# Patient Record
Sex: FEMALE | Race: WHITE | NOT HISPANIC OR LATINO | Employment: OTHER | URBAN - METROPOLITAN AREA
[De-identification: names, ages, dates, MRNs, and addresses within clinical notes are randomized per-mention and may not be internally consistent; named-entity substitution may affect disease eponyms.]

---

## 2017-01-23 ENCOUNTER — TELEPHONE (OUTPATIENT)
Dept: FAMILY MEDICINE | Facility: CLINIC | Age: 67
End: 2017-01-23

## 2017-01-23 DIAGNOSIS — F41.9 ANXIETY: Primary | ICD-10-CM

## 2017-01-23 RX ORDER — ESCITALOPRAM OXALATE 10 MG/1
10 TABLET ORAL DAILY
Qty: 90 TABLET | Refills: 0 | Status: SHIPPED | OUTPATIENT
Start: 2017-01-23 | End: 2017-03-08 | Stop reason: DRUGHIGH

## 2017-01-23 NOTE — TELEPHONE ENCOUNTER
Lexapro  Last Written Prescription Date: 12/28/16  Last Fill Quantity: 30, # refills: 0  Last Office Visit with [facility] primary care provider:  Was suppose to come in  1/17/17 apt now scheduled on 2/10/17  Do you wish to refill? Mary was given? Routed to PCP for advise.   Next 5 appointments (look out 90 days)     Feb 10, 2017  2:20 PM   [facility]oun [facility]   [facility]    [address] [place] [id number] [phone]                   Last PHQ-9 score on record= No flowsheet data found.  Svetlana Tracy,Clinic Rn  [facility]

## 2017-02-10 ENCOUNTER — OFFICE VISIT (OUTPATIENT)
Dept: FAMILY MEDICINE | Facility: CLINIC | Age: 67
End: 2017-02-10
Payer: COMMERCIAL

## 2017-02-10 VITALS
RESPIRATION RATE: 12 BRPM | DIASTOLIC BLOOD PRESSURE: 74 MMHG | SYSTOLIC BLOOD PRESSURE: 132 MMHG | HEART RATE: 66 BPM | TEMPERATURE: 97.9 F | HEIGHT: 63 IN | WEIGHT: 115.4 LBS | BODY MASS INDEX: 20.45 KG/M2

## 2017-02-10 DIAGNOSIS — F41.9 ANXIETY: Primary | ICD-10-CM

## 2017-02-10 DIAGNOSIS — R41.3 MEMORY CHANGES: ICD-10-CM

## 2017-02-10 PROCEDURE — 99214 OFFICE O/P EST MOD 30 MIN: CPT | Performed by: FAMILY MEDICINE

## 2017-02-10 RX ORDER — ESCITALOPRAM OXALATE 10 MG/1
10 TABLET ORAL DAILY
Qty: 90 TABLET | Refills: 0 | Status: CANCELLED | OUTPATIENT
Start: 2017-02-10

## 2017-02-10 RX ORDER — ESCITALOPRAM OXALATE 20 MG/1
20 TABLET ORAL DAILY
Qty: 90 TABLET | Refills: 1 | Status: SHIPPED | OUTPATIENT
Start: 2017-02-10 | End: 2017-08-31

## 2017-02-10 ASSESSMENT — ANXIETY QUESTIONNAIRES
5. BEING SO RESTLESS THAT IT IS HARD TO SIT STILL: NOT AT ALL
7. FEELING AFRAID AS IF SOMETHING AWFUL MIGHT HAPPEN: NOT AT ALL
3. WORRYING TOO MUCH ABOUT DIFFERENT THINGS: SEVERAL DAYS
1. FEELING NERVOUS, ANXIOUS, OR ON EDGE: NOT AT ALL
GAD7 TOTAL SCORE: 1
IF YOU CHECKED OFF ANY PROBLEMS ON THIS QUESTIONNAIRE, HOW DIFFICULT HAVE THESE PROBLEMS MADE IT FOR YOU TO DO YOUR WORK, TAKE CARE OF THINGS AT HOME, OR GET ALONG WITH OTHER PEOPLE: NOT DIFFICULT AT ALL
2. NOT BEING ABLE TO STOP OR CONTROL WORRYING: NOT AT ALL
6. BECOMING EASILY ANNOYED OR IRRITABLE: NOT AT ALL

## 2017-02-10 ASSESSMENT — PATIENT HEALTH QUESTIONNAIRE - PHQ9: 5. POOR APPETITE OR OVEREATING: NOT AT ALL

## 2017-02-10 NOTE — NURSING NOTE
"Chief Complaint   Patient presents with     Anxiety     F/u on medication     Memory Loss     Touch base on memory issues       Initial /80 mmHg  Pulse 66  Temp(Src) 97.9  F (36.6  C) (Oral)  Resp 12  Ht 5' 2.5\" (1.588 m)  Wt 115 lb 6.4 oz (52.345 kg)  BMI 20.76 kg/m2 Estimated body mass index is 20.76 kg/(m^2) as calculated from the following:    Height as of this encounter: 5' 2.5\" (1.588 m).    Weight as of this encounter: 115 lb 6.4 oz (52.345 kg).  Medication Reconciliation: complete    "

## 2017-02-10 NOTE — MR AVS SNAPSHOT
After Visit Summary   2/10/2017    Shari Graham    MRN: [id number]           Patient Information     Date Of Birth          1950        Visit Information        Provider Department      2/10/2017 2:20 PM Susan Calhoun MD [facility]        Today's Diagnoses     Anxiety    -  1       Care Instructions    I encourage you to consider neuropsych testing (which can help us determine possible causes of changes to memory or if there are significant memory issues). If you decide you would like to pursue this you can call my office to help arrange this testing.    However, I also support increasing your lexapro and seeing if reducing anxiety may help as well.    And I support the thoughtful decisions you are making about next steps.        Follow-ups after your visit        Who to contact     If you have questions or need follow up information about today's clinic visit or your schedule please contact [facility] directly at [phone].  Normal or non-critical lab and imaging results will be communicated to you by Jud[employer] Bookhart, letter or phone within 4 business days after the clinic has received the results. If you do not hear from us within 7 days, please contact the clinic through Judys Bookhart or phone. If you have a critical or abnormal lab result, we will notify you by phone as soon as possible.  Submit refill requests through [employer] or call your pharmacy and they will forward the refill request to us. Please allow 3 business days for your refill to be completed.          Additional Information About Your Visit        Judys Bookhart Information     [employer] gives you secure access to your electronic health record. If you see a primary care provider, you can also send messages to your care team and make appointments. If you have questions, please call your primary care clinic.  If you do not have a primary care provider, please call [phone] and they will  "assist you.        Care EveryWhere ID     This is your Care EveryWhere ID. This could be used by other organizations to access your [facility] medical records  [id number]        Your Vitals Were     Pulse Temperature Respirations Height BMI (Body Mass Index)       66 97.9  F (36.6  C) (Oral) 12 5' 2.5\" (1.588 m) 20.76 kg/m2        Blood Pressure from Last 3 Encounters:   02/10/17 132/74   09/20/16 130/76   06/21/16 140/80    Weight from Last 3 Encounters:   02/10/17 115 lb 6.4 oz (52.345 kg)   09/20/16 110 lb (49.896 kg)   06/21/16 106 lb (48.081 kg)              Today, you had the following     No orders found for display         Today's Medication Changes          These changes are accurate as of: 2/10/17  3:02 PM.  If you have any questions, ask your nurse or doctor.               These medicines have changed or have updated prescriptions.        Dose/Directions    * escitalopram 10 MG tablet   Commonly known as:  LEXAPRO   This may have changed:  Another medication with the same name was added. Make sure you understand how and when to take each.   Used for:  Anxiety   Changed by:  Susan Calhoun MD        Dose:  10 mg   Take 1 tablet (10 mg) by mouth daily   Quantity:  90 tablet   Refills:  0       * escitalopram 20 MG tablet   Commonly known as:  LEXAPRO   This may have changed:  You were already taking a medication with the same name, and this prescription was added. Make sure you understand how and when to take each.   Used for:  Anxiety   Changed by:  Susan Calhoun MD        Dose:  20 mg   Take 1 tablet (20 mg) by mouth daily   Quantity:  90 tablet   Refills:  1       * Notice:  This list has 2 medication(s) that are the same as other medications prescribed for you. Read the directions carefully, and ask your doctor or other care provider to review them with you.         Where to get your medicines      These medications were sent to [facility] [place], [place] " - [address]  [address] [facility] [address]     Phone:  [phone]    - escitalopram 20 MG tablet             Primary Care Provider Office Phone # Fax #    Susan Calhoun [phone]       [facility] [address] [address]        Thank you!     Thank you for choosing [facility]  for your care. Our goal is always to provide you with excellent care. Hearing back from our patients is one way we can continue to improve our services. Please take a few minutes to complete the written survey that you may receive in the mail after your visit with us. Thank you!             Your Updated Medication List - Protect others around you: Learn how to safely use, store and throw away your medicines at www.disposemymeds.org.          This list is accurate as of: 2/10/17  3:02 PM.  Always use your most recent med list.                   Brand Name Dispense Instructions for use    BIOTIN          CALCIUM CARBONATE          * escitalopram 10 MG tablet    LEXAPRO    90 tablet    Take 1 tablet (10 mg) by mouth daily       * escitalopram 20 MG tablet    LEXAPRO    90 tablet    Take 1 tablet (20 mg) by mouth daily       FISH OIL          FLAXSEED          GLUCOSAMINE CHOND COMPLEX/MSM Tabs          metoprolol 25 MG 24 hr tablet    TOPROL-XL    90 tablet    Take 1 tablet (25 mg) by mouth daily       MULTIPLE VITAMIN PO          simvastatin 10 MG tablet    ZOCOR    90 tablet    Take 1 tablet (10 mg) by mouth At Bedtime       traZODone 50 MG tablet    DESYREL    60 tablet    1/2 tablet up to 2 tablets once nightly as needed for insomnia       VITAMIN D PO          VITAMIN E          * Notice:  This list has 2 medication(s) that are the same as other medications prescribed for you. Read the directions carefully, and ask your doctor or other care provider to review them with you.

## 2017-02-10 NOTE — PATIENT INSTRUCTIONS
I encourage you to consider neuropsych testing (which can help us determine possible causes of changes to memory or if there are significant memory issues). If you decide you would like to pursue this you can call my office to help arrange this testing.    However, I also support increasing your lexapro and seeing if reducing anxiety may help as well.    And I support the thoughtful decisions you are making about next steps.    [facility]   Discharged by : Marlee LARKIN MA    If you have any questions regarding your visit please contact your care team:     [facility] Clinic Hours Telephone Number   Dr. Rosita Guardado, HOLDEN   7am-7pm Monday - Thursday   7am-5pm Fridays  [phone]   (Appointment scheduling available 24/7)   RN Line   [phone] option 2       For a Price Quote for your services, please call our [place] Price Line at [phone].     What options do I have for visits at the clinic other than the traditional office visit?     To expand how we care for you, many of our providers are utilizing electronic visits (e-visits) and telephone visits, when medically appropriate, for interactions with their patients rather than a visit in the clinic. We also offer nurse visits for many medical concerns. Just like any other service, we will bill your insurance company for this type of visit based on time spent on the phone with your provider. Not all insurance companies cover these visits. Please check with your medical insurance if this type of visit is covered. You will be responsible for any charges that are not paid by your insurance.   E-visits via [employer]: generally incur a $35.00 fee.     Telephone visits:   Time spent on the phone: *charged based on time that is spent on the phone in increments of 10 minutes. Estimated cost:   5-10 mins $30.00   11-20 mins. $59.00   21-30 mins. $85.00     Use [employer] (secure email communication and  access to your chart) to send your primary care provider a message or make an appointment. Ask someone on your Team how to sign up for [employer].     As always, Thank you for trusting us with your health care needs!      [facility]:    Scheduling Appointments  [facility]  Call: [phone]    LawrenceElio martinemily, [facility]  Call: [phone]    [facility]  Call: [phone]      WHERE TO GO FOR CARE?    Clinic    Make an appointment if you:       Are sick (cold, cough, flu, sore throat, earache or in pain).       Have a small injury (sprain, small cut, burn or broken bone).       Need a physical exam, Pap smear, vaccine or prescription refill.       Have questions about your health or medicines.    To reach us:      Call [phone] ([phone]). Open 24 hours every day. (For counseling services, call [phone].)    Log into [employer] at [email or web address]. (Visit [email or web address] to create an account.) Hospital emergency room    An emergency is a serious or life- threatening problem that must be treated right away.    Call 766 or get to the hospital if you have:      Very bad or sudden:            - Chest pain or pressure         - Bleeding         - Head or belly pain         - Dizziness or trouble seeing, walking or                          Speaking      Problems breathing      Blood in your vomit or you are coughing up blood      A major injury (knocked out, loss of a finger or limb, rape, broken bone protruding from skin)    A mental health crisis. (Or call the Mental Health Crisis line at 1-420.405.8653 or Suicide Prevention Hotline at 1-837.348.3532.)    Open 24 hours every day. You don't need an appointment.     Urgent care    Visit urgent care for sickness or small injuries when the clinic is closed. You don't need an appointment. To check hours or find an urgent care near you, visit [email or web address]. Online  care    Get online care from [facility] for more than 70 common problems, like colds, allergies and infections. Open 24 hours every day at: [email or web address]; to ([email or web address]   Need help deciding?    For advice about where to be seen, you may call your clinic and ask to speak with a nurse. We're here for you 24 hours every day.         If you are deaf or hard of hearing, please let us know. We provide many free services including sign language interpreters, oral interpreters, TTYs, telephone amplifiers, note takers and written materials.

## 2017-02-10 NOTE — PROGRESS NOTES
SUBJECTIVE:                                                    Shari Graham is a 66 year old female who presents to clinic today for the following health issues:      Anxiety Follow-Up    Status since last visit: Improved     Other associated symptoms:None    Complicating factors:   Significant life event: No   Current substance abuse: None  Depression symptoms: No  AURELIO-7 SCORE 9/8/2014 6/21/2016 7/19/2016   Total Score 8 - -   Total Score - 7 0        GAD7     Amount of exercise or physical activity: 2-3 days/week for an average of 45-60 minutes- Yoga    Problems taking medications regularly: No    Medication side effects: none    Diet: regular (no restrictions)    Denies medication side effects. She is eating better and has regained some weight. She still has occasional anxiety, especially with stressful situations, but notes she has had anxiety since childhood. She is interested in increasing Lexapro to 20 mg. She is sleeping well. Would like to continue trazodone.     Memory:  Patient had concerning 6CIT results 9/20/16, scoring 8/28. She went to [facility] and had a 24 hour flight home that day before that visit. She thinks this was a large part of her memory troubles and after a few weeks home did not worry about memory anymore. Discussed further neuropsych testing.     Patient adds that she hit her head around 4-5 years ago, when the garage door game down and struck the top of her head. The garage door kept coming down, causing the patient to fall. She laid on the ground in the garage for quite some time and couldn't get up. Denies loss of consciousness. She was eventually able to get up and laid in bed for a couple days with some pain at the top of her head. She also had headaches for a long time after that but they gradually went away. She says after that she felt things changed and her memory was not as clear. She was never seen after the fall and has not had any imaging of her head before. Patient  "notes she is very claustrophobic and would need to be sedated for imaging.     Her  clarifies that the patient is anxious when coming to the doctor and worried quite a bit about being asked more memory questions before coming in. Her  personally feels her anxiety is the main cause of her memory issues but she does occasionally misplace items (glasses, pen, etc.)  He thinks her medication is very helpful for her anxiety. They are together for most of the day and he explains day-to-day life is worry free and she has \"incidences\" of anxiety.      Problem list and histories reviewed & adjusted, as indicated.  Additional history: as documented    Patient Active Problem List   Diagnosis     Hyperlipidemia LDL goal <130     HTN, goal below 140/90     Adenomatous colon polyp     Anxiety     Past Surgical History   Procedure Laterality Date     Surgical history of -   grade school     neck cyst removed     Hysterectomy, pap no longer indicated  1995     for reasons of fibroids     Surgical history of -   2011     right cyst removed on wrist     Appendectomy       Colonoscopy         Social History   Substance Use Topics     Smoking status: Former Smoker -- 0.50 packs/day for 11 years     Types: Cigarettes     Quit date: 07/27/1981     Smokeless tobacco: Never Used     Alcohol Use: Yes      Comment: 2/wk     Family History   Problem Relation Age of Onset     HEART DISEASE Mother      DIABETES Mother      Hypertension Mother      CANCER Father      Respiratory Father      Alzheimer Disease Paternal Grandmother      Prostate Cancer Paternal Grandfather      Prostate Cancer Brother      Breast Cancer Sister      DIABETES Maternal Grandmother      Breast Cancer Sister      Prostate Cancer Brother          Current Outpatient Prescriptions   Medication Sig Dispense Refill     escitalopram (LEXAPRO) 10 MG tablet Take 1 tablet (10 mg) by mouth daily 90 tablet 0     simvastatin (ZOCOR) 10 MG tablet Take 1 tablet (10 mg) " "by mouth At Bedtime 90 tablet 3     metoprolol (TOPROL-XL) 25 MG 24 hr tablet Take 1 tablet (25 mg) by mouth daily 90 tablet 3     traZODone (DESYREL) 50 MG tablet 1/2 tablet up to 2 tablets once nightly as needed for insomnia 60 tablet 5     CALCIUM CARBONATE        Cholecalciferol (VITAMIN D PO)        VITAMIN E        FISH OIL        FLAXSEED        MULTIPLE VITAMIN PO        BIOTIN        Misc Natural Products (GLUCOSAMINE CHOND COMPLEX/MSM) TABS        No Known Allergies    ROS:  Constitutional, HEENT, cardiovascular, pulmonary, gi and gu systems are negative, except as otherwise noted.    This document serves as a record of the services and decisions personally performed by Susan Calhoun MD. It was created on his/her behalf by Faith Jenkins, a trained medical scribe. The creation of this document is based on the provider's statements to the medical scribe. Faith Jenkins February 10, 2017 2:23 PM  OBJECTIVE:                                                    /80 mmHg  Pulse 66  Temp(Src) 97.9  F (36.6  C) (Oral)  Resp 12  Ht 5' 2.5\" (1.588 m)  Wt 115 lb 6.4 oz (52.345 kg)  BMI 20.76 kg/m2  Body mass index is 20.76 kg/(m^2).  GENERAL: healthy, alert and no distress  NEURO: Normal strength and tone, mentation intact and speech normal  PSYCH: mentation appears normal, affect normal/bright    Six Item Cognitive Impairment Test   (6CIT):      What year is it?                               Correct - 0 points    What month is it?                               Correct - 0 points      Give the patient an address to remember with five components:   Vamshi Gonzalez ( first and last name - 2 components)   [address]  (number and name of street - 2 components)   [place] ( city - 1 component)      About what time is it (within the hour)? Correct - 0 points    Count backwards from 20 to 1:   One error - 2 points    Say the months of the year in reverse: Correct - 0 points    Repeat the address " phrase:   All wrong - 10 points    Total 6CIT Score:      12/28    Interpretation: The 6CIT uses an inverse score and questions are weighted to produce a total out of 28. Scores of 0-7 are considered normal and 8 or more significant.    Advantages The test has high sensitivity without compromising specificity even in mild dementia. It is easy to translate linguistically and culturally.  Disadvantages The main disadvantage is in the scoring and weighting of the test, which is initially confusing, however computer models have simplified this greatly.    Probability Statistics: At the 7/8 cut off: Overall figures sensitivity 90% specificity 100%, in mild dementia sensitivity = 78% , specificity = 100%    Copyright 2000 [facility], [facility]. Courtesy of Dr. Viral Nunes       ASSESSMENT/PLAN:                                                    (F41.9) Anxiety  (primary encounter diagnosis)  Comment: Still has situational anxiety from time to time. Patient desires to increase medication.   Plan: escitalopram (LEXAPRO) 20 MG tablet        Will increase to 20 mg daily      (R41.3) Memory changes  Comment: it seems quite possible that anxiety is influencing her ability to perform memory testing, even in the office  Plan: had a long discussion with patient and her .  Discussed the purpose of screening for memory concerns, the fact that they spend their days together may mask some memory changes but also provides protection from harm for Soraya (which is a significant reason we are screening).  Without confirmatory testing, we may be missing opportunities for treatment of treatable causes of memory changes.  They would prefer to continue to monitor at this time.  Discussed warning signs/symptoms for which she needs followup.      25minutes spent with patient>50% in counseling regarding causes and treatments of memory changes.        Patient Instructions     I encourage you to consider neuropsych  testing (which can help us determine possible causes of changes to memory or if there are significant memory issues). If you decide you would like to pursue this you can call my office to help arrange this testing.    However, I also support increasing your lexapro and seeing if reducing anxiety may help as well.    And I support the thoughtful decisions you are making about next steps.    [facility]   Discharged by : Marlee LARKIN MA    If you have any questions regarding your visit please contact your care team:     [facility] Clinic Hours Telephone Number   Dr. Rosita Guardado PA-C   7am-7pm Monday - Thursday   7am-5pm Fridays  [phone]   (Appointment scheduling available 24/7)   RN Line   [phone] option 2       For a Price Quote for your services, please call our [employer] Price Line at [phone].     What options do I have for visits at the clinic other than the traditional office visit?     To expand how we care for you, many of our providers are utilizing electronic visits (e-visits) and telephone visits, when medically appropriate, for interactions with their patients rather than a visit in the clinic. We also offer nurse visits for many medical concerns. Just like any other service, we will bill your insurance company for this type of visit based on time spent on the phone with your provider. Not all insurance companies cover these visits. Please check with your medical insurance if this type of visit is covered. You will be responsible for any charges that are not paid by your insurance.   E-visits via Paperlinks: generally incur a $35.00 fee.     Telephone visits:   Time spent on the phone: *charged based on time that is spent on the phone in increments of 10 minutes. Estimated cost:   5-10 mins $30.00   11-20 mins. $59.00   21-30 mins. $85.00     Use Paperlinks (secure email communication and access to your chart) to send your  primary care provider a message or make an appointment. Ask someone on your Team how to sign up for [employer].     As always, Thank you for trusting us with your health care needs!      [facility]:    Scheduling Appointments  [facility]  Call: [phone]    [place]  Call: [phone]    [facility]  Call: [phone]      WHERE TO GO FOR CARE?    Clinic    Make an appointment if you:       Are sick (cold, cough, flu, sore throat, earache or in pain).       Have a small injury (sprain, small cut, burn or broken bone).       Need a physical exam, Pap smear, vaccine or prescription refill.       Have questions about your health or medicines.    To reach us:      Call [phone] ([phone]). Open 24 hours every day. (For counseling services, call [phone].)    Log into [employer] at [email or web address]. (Visit [email or web address] to create an account.) Hospital emergency room    An emergency is a serious or life- threatening problem that must be treated right away.    Call 454 or get to the hospital if you have:      Very bad or sudden:            - Chest pain or pressure         - Bleeding         - Head or belly pain         - Dizziness or trouble seeing, walking or                          Speaking      Problems breathing      Blood in your vomit or you are coughing up blood      A major injury (knocked out, loss of a finger or limb, rape, broken bone protruding from skin)    A mental health crisis. (Or call the Mental Health Crisis line at 1-540.132.4186 or Suicide Prevention Hotline at 1-406.235.7616.)    Open 24 hours every day. You don't need an appointment.     Urgent care    Visit urgent care for sickness or small injuries when the clinic is closed. You don't need an appointment. To check hours or find an urgent care near you, visit [email or web address]. Online care    Get online care from [facility] for more than 70 common problems, like colds, allergies and infections. Open 24 hours every day at: [email or web address]   Need help deciding?    For advice about where to be seen, you may call your clinic and ask to speak with a nurse. We're here for you 24 hours every day.         If you are deaf or hard of hearing, please let us know. We provide many free services including sign language interpreters, oral interpreters, TTYs, telephone amplifiers, note takers and written materials.                       The information in this document, created by the medical scribmati Jenkins for me, accurately reflects the services I personally performed and the decisions made by me. I have reviewed and approved this document for accuracy prior to leaving the patient care area.  Susan Calhoun MD  [facility]

## 2017-02-11 ASSESSMENT — ANXIETY QUESTIONNAIRES: GAD7 TOTAL SCORE: 1

## 2017-03-08 ENCOUNTER — TELEPHONE (OUTPATIENT)
Dept: FAMILY MEDICINE | Facility: CLINIC | Age: 67
End: 2017-03-08

## 2017-03-08 NOTE — TELEPHONE ENCOUNTER
Calling to talk to Soraya regarding the increase of her lexapro about four weeks ago.  She says her anxiety symptoms have improved dramatically since increasing her dose.  And subsequently sleep has improved as well.  Her  agrees.    Doesn't take trazodone regularly - only takes 1/4-1/2 a pill when she does need to take it.  Does not need refills of trazodone at this time.

## 2017-06-11 DIAGNOSIS — F41.9 ANXIETY: ICD-10-CM

## 2017-06-11 DIAGNOSIS — G47.00 INSOMNIA: ICD-10-CM

## 2017-06-13 RX ORDER — TRAZODONE HYDROCHLORIDE 50 MG/1
TABLET, FILM COATED ORAL
Qty: 60 TABLET | Refills: 2 | Status: SHIPPED | OUTPATIENT
Start: 2017-06-13 | End: 2018-02-23

## 2017-08-31 DIAGNOSIS — F41.9 ANXIETY: ICD-10-CM

## 2017-08-31 NOTE — TELEPHONE ENCOUNTER
escitalopram (LEXAPRO) 20 MG tablet   20 mg, DAILY 1 ordered  Edit     Summary: Take 1 tablet (20 mg) by mouth daily, Disp-90 tablet, R-1, E-Prescribe   Dose, Route, Frequency: 20 mg, Oral, DAILY  Start: 2/10/2017  Ord/Sold: 2/10/2017 (O)  Report  Taking:   Long-term:   Pharmacy: [facility] - [address]  Med Dose History       Patient Sig: Take 1 tablet (20 mg) by mouth daily       Ordered on: 2/10/2017       Authorized by: SHANTEL BARTHOLOMEW       Dispense: 90 tablet          Last Office Visit with [facility] primary care provider:  2-        Last PHQ-9 score on record= No flowsheet data found.

## 2017-09-01 RX ORDER — ESCITALOPRAM OXALATE 20 MG/1
TABLET ORAL
Qty: 90 TABLET | Refills: 0 | Status: SHIPPED | OUTPATIENT
Start: 2017-09-01 | End: 2017-12-02

## 2017-10-04 DIAGNOSIS — I10 ESSENTIAL HYPERTENSION WITH GOAL BLOOD PRESSURE LESS THAN 140/90: ICD-10-CM

## 2017-10-04 NOTE — TELEPHONE ENCOUNTER
Medication Detail      Disp Refills Start End DENIS   metoprolol (TOPROL-XL) 25 MG 24 hr tablet 90 tablet 3 9/20/2016  No   Sig: Take 1 tablet (25 mg) by mouth daily   Class: E-Prescribe   Notes to Pharmacy: Profile Rx: patient will contact pharmacy when needed   Route: Oral   Order: [id number]       Last Office Visit with [facility], [facility] or [facility] prescribing provider:  2/10/2017   Future Office Visit:    Next 5 appointments (look out 90 days)     Oct 06, 2017  9:20 AM CDT   PHYSICAL with Susan Calhoun MD   [facility] ([facility]    [address] [place] [id number] [phone]                    BP Readings from Last 3 Encounters:   02/10/17 132/74   09/20/16 130/76   06/21/16 140/80

## 2017-10-05 RX ORDER — METOPROLOL SUCCINATE 25 MG/1
TABLET, EXTENDED RELEASE ORAL
Qty: 90 TABLET | Refills: 1 | Status: SHIPPED | OUTPATIENT
Start: 2017-10-05 | End: 2017-10-06 | Stop reason: DRUGHIGH

## 2017-10-05 NOTE — TELEPHONE ENCOUNTER
Prescription approved per [facility] Refill Protocol.    Salvador Ibrahim RN

## 2017-10-06 ENCOUNTER — OFFICE VISIT (OUTPATIENT)
Dept: FAMILY MEDICINE | Facility: CLINIC | Age: 67
End: 2017-10-06
Payer: COMMERCIAL

## 2017-10-06 VITALS
HEIGHT: 63 IN | SYSTOLIC BLOOD PRESSURE: 150 MMHG | DIASTOLIC BLOOD PRESSURE: 72 MMHG | HEART RATE: 63 BPM | OXYGEN SATURATION: 98 % | TEMPERATURE: 98.1 F | WEIGHT: 122.5 LBS | BODY MASS INDEX: 21.71 KG/M2

## 2017-10-06 DIAGNOSIS — Z00.00 ROUTINE GENERAL MEDICAL EXAMINATION AT A HEALTH CARE FACILITY: Primary | ICD-10-CM

## 2017-10-06 DIAGNOSIS — Z23 NEED FOR PROPHYLACTIC VACCINATION AND INOCULATION AGAINST INFLUENZA: ICD-10-CM

## 2017-10-06 DIAGNOSIS — Z91.81 AT RISK FOR FALLING: ICD-10-CM

## 2017-10-06 DIAGNOSIS — Z12.39 SCREENING FOR BREAST CANCER: ICD-10-CM

## 2017-10-06 DIAGNOSIS — R41.3 MEMORY CHANGES: ICD-10-CM

## 2017-10-06 DIAGNOSIS — I10 HTN, GOAL BELOW 140/90: ICD-10-CM

## 2017-10-06 DIAGNOSIS — E78.5 HYPERLIPIDEMIA LDL GOAL <130: ICD-10-CM

## 2017-10-06 LAB
ERYTHROCYTE [DISTWIDTH] IN BLOOD BY AUTOMATED COUNT: 12.8 % (ref 10–15)
FOLATE SERPL-MCNC: 20 NG/ML
HCT VFR BLD AUTO: 43 % (ref 35–47)
HGB BLD-MCNC: 14.1 G/DL (ref 11.7–15.7)
MCH RBC QN AUTO: 30.8 PG (ref 26.5–33)
MCHC RBC AUTO-ENTMCNC: 32.8 G/DL (ref 31.5–36.5)
MCV RBC AUTO: 94 FL (ref 78–100)
PLATELET # BLD AUTO: 219 10E9/L (ref 150–450)
RBC # BLD AUTO: 4.58 10E12/L (ref 3.8–5.2)
VIT B12 SERPL-MCNC: 385 PG/ML (ref 193–986)
WBC # BLD AUTO: 6.1 10E9/L (ref 4–11)

## 2017-10-06 PROCEDURE — 82746 ASSAY OF FOLIC ACID SERUM: CPT | Performed by: FAMILY MEDICINE

## 2017-10-06 PROCEDURE — 80048 BASIC METABOLIC PNL TOTAL CA: CPT | Performed by: FAMILY MEDICINE

## 2017-10-06 PROCEDURE — 84443 ASSAY THYROID STIM HORMONE: CPT | Performed by: FAMILY MEDICINE

## 2017-10-06 PROCEDURE — 36415 COLL VENOUS BLD VENIPUNCTURE: CPT | Performed by: FAMILY MEDICINE

## 2017-10-06 PROCEDURE — 90662 IIV NO PRSV INCREASED AG IM: CPT | Performed by: FAMILY MEDICINE

## 2017-10-06 PROCEDURE — 99214 OFFICE O/P EST MOD 30 MIN: CPT | Mod: 25 | Performed by: FAMILY MEDICINE

## 2017-10-06 PROCEDURE — G0008 ADMIN INFLUENZA VIRUS VAC: HCPCS | Performed by: FAMILY MEDICINE

## 2017-10-06 PROCEDURE — 85027 COMPLETE CBC AUTOMATED: CPT | Performed by: FAMILY MEDICINE

## 2017-10-06 PROCEDURE — 99397 PER PM REEVAL EST PAT 65+ YR: CPT | Mod: 25 | Performed by: FAMILY MEDICINE

## 2017-10-06 PROCEDURE — 82607 VITAMIN B-12: CPT | Performed by: FAMILY MEDICINE

## 2017-10-06 PROCEDURE — 80061 LIPID PANEL: CPT | Performed by: FAMILY MEDICINE

## 2017-10-06 RX ORDER — METOPROLOL SUCCINATE 50 MG/1
50 TABLET, EXTENDED RELEASE ORAL DAILY
Qty: 90 TABLET | Refills: 1 | Status: SHIPPED | OUTPATIENT
Start: 2017-10-06 | End: 2018-02-23

## 2017-10-06 RX ORDER — DONEPEZIL HYDROCHLORIDE 5 MG/1
5 TABLET, FILM COATED ORAL AT BEDTIME
Qty: 30 TABLET | Refills: 1 | Status: SHIPPED | OUTPATIENT
Start: 2017-10-06 | End: 2017-11-03

## 2017-10-06 NOTE — PROGRESS NOTES
SUBJECTIVE:   Shari Graham is a 67 year old female who presents for Preventive Visit.    Are you in the first 12 months of your Medicare coverage?  No    Presents with Daughter Jose.  Also,  [place] is present for end of visit.    Physical   Annual:     Getting at least 3 servings of Calcium per day::  Yes    Bi-annual eye exam::  Yes    Dental care twice a year::  Yes    Sleep apnea or symptoms of sleep apnea::  None    Frequency of exercise::  2-3 days/week    Duration of exercise::  30-45 minutes    Taking medications regularly::  Yes    Medication side effects::  None    Additional concerns today::  No      COGNITIVE SCREEN  1) Repeat 3 items (Banana, Sunrise, Chair)    2) Clock draw: ABNORMAL clock  3) 3 item recall: Recalls NO objects   Results: 0 items recalled: PROBABLE COGNITIVE IMPAIRMENT, **INFORM PROVIDER**    Mini-CogTM Copyright RAULITO King. Licensed by the author for use in [facility]; reprinted with permission (annia@[facility]). All rights reserved.      HTN:  Patient states that she hasn't been checking her blood pressures at home anymore as it has been stable for a long time. It was higher in clinic today, so she is concerned about this. She states that she still is taking her daily medication.     Weight Management:  She states she has recently gain some weight, and she is happy about this. She says she was too thin for a while. She says she eats dinner and breakfast every day, just two meals and sometimes a snack.     Memory:  Her daughter says that her anxiety has been doing really well. She says that her mom does really well in her own home, but when she gets out of the home and out of her environment it seems her anxiety increases and she becomes more forgetful. The patient states she misplaces things. Her daughter says she knows all of the big stuff, but she seems to forget the small things. She says that her mother will ask the same questions multiple times. Her  daughter says that her anxiety really seems to affect her forgetfulness, but her daughter states that her anxiety and significantly improved.     Of note, I met with daughter and son (Luis) a few weeks ago, independent from their mother, and their description of their mother included repeating sentences and questions. Having to  their children, Soraya's grandchildren, that sometimes grandma will say strange things or repeat herself. She is still able to do her own ADL's at home and cook, but she gets confused and anxious outside her own home.  When her son traveled with Soraya and her  to [place] he noted how out of sorts his mom was outside of a familiar environment.      Reviewed and updated as needed this visit by clinical staff  Tobacco  Allergies  Meds  Problems  Med Hx  Surg Hx  Fam Hx  Soc Hx          Reviewed and updated as needed this visit by Provider  Allergies  Meds  Problems        Social History   Substance Use Topics     Smoking status: Former Smoker     Packs/day: 0.50     Years: 11.00     Types: Cigarettes     Quit date: 7/27/1981     Smokeless tobacco: Never Used     Alcohol use Yes      Comment: 2/wk       The patient does not drink >3 drinks per day nor >7 drinks per week.              Today's PHQ-2 Score:   PHQ-2 ( 1999 [employer]) 10/6/2017   Q1: Little interest or pleasure in doing things 0   Q2: Feeling down, depressed or hopeless 0   PHQ-2 Score 0   Q1: Little interest or pleasure in doing things Not at all   Q2: Feeling down, depressed or hopeless Not at all   PHQ-2 Score 0       Do you feel safe in your environment - Yes    Do you have a Health Care Directive?: No: Advance care planning was reviewed with patient; patient declined at this time.      Current providers sharing in care for this patient include: Patient Care Team:  Susan Calhoun MD as PCP - General (Family Practice)      Hearing impairment: Yes, wearing hearing aids    Ability to successfully  perform activities of daily living: Yes, no assistance needed     Fall risk:  Fallen 2 or more times in the past year?: No  Any fall with injury in the past year?: No      Home safety:  none identified      The following health maintenance items are reviewed in Epic and correct as of today:  Health Maintenance   Topic Date Due     ADVANCE DIRECTIVE PLANNING Q5 YRS  08/13/2017     INFLUENZA VACCINE (SYSTEM ASSIGNED)  09/01/2017     BMP Q1 YR  09/20/2017     LIPID MONITORING Q1 YEAR  09/20/2017     FALL RISK ASSESSMENT  09/20/2017     MAMMO Q1 YR  10/13/2017     COLONOSCOPY Q5 YR  09/22/2019     TETANUS IMMUNIZATION (SYSTEM ASSIGNED)  11/14/2022     DEXA SCAN SCREENING (SYSTEM ASSIGNED)  Addressed     PNEUMOCOCCAL  Completed     HEPATITIS C SCREENING  Completed     Patient Active Problem List   Diagnosis     Hyperlipidemia LDL goal <130     HTN, goal below 140/90     Adenomatous colon polyp     Anxiety     Past Surgical History:   Procedure Laterality Date     APPENDECTOMY       COLONOSCOPY       HYSTERECTOMY, PAP NO LONGER INDICATED  1995    for reasons of fibroids     SURGICAL HISTORY OF -   grade school    neck cyst removed     SURGICAL HISTORY OF -   2011    right cyst removed on wrist       Social History   Substance Use Topics     Smoking status: Former Smoker     Packs/day: 0.50     Years: 11.00     Types: Cigarettes     Quit date: 7/27/1981     Smokeless tobacco: Never Used     Alcohol use Yes      Comment: 2/wk     Family History   Problem Relation Age of Onset     HEART DISEASE Mother      DIABETES Mother      Hypertension Mother      CANCER Father      Respiratory Father      Alzheimer Disease Paternal Grandmother      Prostate Cancer Paternal Grandfather      Prostate Cancer Brother      Breast Cancer Sister      DIABETES Maternal Grandmother          Current Outpatient Prescriptions   Medication Sig Dispense Refill     metoprolol (TOPROL-XL) 50 MG 24 hr tablet Take 1 tablet (50 mg) by mouth daily 90  "tablet 1     donepezil (ARICEPT) 5 MG tablet Take 1 tablet (5 mg) by mouth At Bedtime 30 tablet 1     escitalopram (LEXAPRO) 20 MG tablet TAKE ONE TABLET BY MOUTH EVERY DAY 90 tablet 0     traZODone (DESYREL) 50 MG tablet TAKE ONE-HALF TO TWO TABLETS BY MOUTH NIGHTLY AS NEEDED FOR INSOMNIA 60 tablet 2     simvastatin (ZOCOR) 10 MG tablet Take 1 tablet (10 mg) by mouth At Bedtime 90 tablet 3     CALCIUM CARBONATE        Cholecalciferol (VITAMIN D PO)        VITAMIN E        FISH OIL        FLAXSEED        MULTIPLE VITAMIN PO        BIOTIN        Misc Natural Products (GLUCOSAMINE CHOND COMPLEX/MSM) TABS        [DISCONTINUED] metoprolol (TOPROL-XL) 25 MG 24 hr tablet TAKE ONE TABLET BY MOUTH EVERY DAY 90 tablet 1     No Known Allergies      Pneumonia Vaccine:Adults age 65+ who received Pneumovax (PPSV23) at 65 years or older: Should be given PCV13 > 1 year after their most recent PPSV23  Mammogram Screening: Patient over age 50, mutual decision to screen reflected in health maintenance.    ROS:  Constitutional, HEENT, cardiovascular, pulmonary, gi and gu systems are negative, except as otherwise noted.    This document serves as a record of the services and decisions personally performed by SHANTEL BARTHOLOMEW. It was created on his/her behalf by La Cates, a trained medical scribe. The creation of this document is based on the provider's statements to the medical scribe. La Cates, October 6, 2017 9:45 AM    OBJECTIVE:   /72  Pulse 63  Temp 98.1  F (36.7  C) (Oral)  Ht 5' 2.5\" (1.588 m)  Wt 122 lb 8 oz (55.6 kg)  SpO2 98%  BMI 22.05 kg/m2 Estimated body mass index is 22.05 kg/(m^2) as calculated from the following:    Height as of this encounter: 5' 2.5\" (1.588 m).    Weight as of this encounter: 122 lb 8 oz (55.6 kg).  EXAM:   GENERAL APPEARANCE: healthy, alert and no distress  EYES: Eyes grossly normal to inspection, PERRL and conjunctivae and sclerae normal  HENT: ear canals and TM's normal, " nose and mouth without ulcers or lesions, oropharynx clear and oral mucous membranes moist  NECK: no adenopathy, no asymmetry, masses, or scars and thyroid normal to palpation  RESP: lungs clear to auscultation - no rales, rhonchi or wheezes  BREAST: normal without masses, tenderness or nipple discharge and no palpable axillary masses or adenopathy  CV: regular rate and rhythm, normal S1 S2, no S3 or S4, no murmur, click or rub, no peripheral edema and peripheral pulses strong  ABDOMEN: soft, nontender, no hepatosplenomegaly, no masses and bowel sounds normal  MS: no musculoskeletal defects are noted and gait is age appropriate without ataxia  SKIN: no suspicious lesions or rashes  NEURO: Normal strength and tone, sensory exam grossly normal, mentation intact and speech normal  PSYCH: mentation appears normal and affect normal/bright    ASSESSMENT / PLAN:   1. Routine general medical examination at a health care facility  Cognitive screening abnormal.    Other health care maintenance up to date per chart or patient report.      2. Need for prophylactic vaccination and inoculation against influenza  Health maintenance.  - FLU VACCINE, INCREASED ANTIGEN, PRESV FREE, AGE 65+ [09733]  - ADMIN INFLUENZA (For MEDICARE Patients ONLY) []    3. At risk for falling      4. HTN, goal below 140/90  BP was elevated in clinic today. I increased her metoprolol to 50 mg. I advised her to check her BP at home once a week, and if it is staying higher than 150/100 to call my nurse. She will follow up in 1 month for a med check.   - BASIC METABOLIC PANEL  - CBC with platelets  - metoprolol (TOPROL-XL) 50 MG 24 hr tablet; Take 1 tablet (50 mg) by mouth daily  Dispense: 90 tablet; Refill: 1    5. Memory changes  Patient's cognitive screening was abnormal. She recalled no objects and had an abnormal clock.Her cognitive screening has been abnormal for the past couple of visits, but she and her  have been unwilling to consider  "evaluation and/acknowledgement of the problems. Her daughter states she has been more forgetful.  And I have had conversation with her daughter and her son that supports a diagnosis of dementia.   I prescribed her aricept. Adjust treatment based on labs.  We also discussed MRI and Neuropsych testing to help define the underlying cause of memory loss.  But patient and her  decline additional testing at this time.  I discussed with the patient risks and benefits of the new medications prescribed including potential side effects.  The patient had opportunity to ask questions and is comfortable with and interested in medications as prescribed.    - BASIC METABOLIC PANEL  - Lipid panel reflex to direct LDL  - CBC with platelets  - Vitamin B12  - Folate  - TSH with free T4 reflex  - donepezil (ARICEPT) 5 MG tablet; Take 1 tablet (5 mg) by mouth At Bedtime  Dispense: 30 tablet; Refill: 1    End of Life Planning:  Patient currently has an advanced directive: No.  I have verified the patient's ablity to prepare an advanced directive/make health care decisions.  Literature was provided to assist patient in preparing an advanced directive.    COUNSELING:  Reviewed preventive health counseling, as reflected in patient instructions    Estimated body mass index is 22.05 kg/(m^2) as calculated from the following:    Height as of this encounter: 5' 2.5\" (1.588 m).    Weight as of this encounter: 122 lb 8 oz (55.6 kg).     reports that she quit smoking about 36 years ago. Her smoking use included Cigarettes. She has a 5.50 pack-year smoking history. She has never used smokeless tobacco.        Appropriate preventive services were discussed with this patient, including applicable screening as appropriate for cardiovascular disease, diabetes, osteopenia/osteoporosis, and glaucoma.  As appropriate for age/gender, discussed screening for colorectal cancer, prostate cancer, breast cancer, and cervical cancer. Checklist reviewing " preventive services available has been given to the patient.    Reviewed patients plan of care and provided an AVS. The Basic Care Plan (routine screening as documented in Health Maintenance) for Shari meets the Care Plan requirement. This Care Plan has been established and reviewed with the Patient and daughter.    Counseling Resources:  ATP IV Guidelines  Pooled Cohorts Equation Calculator  Breast Cancer Risk Calculator  FRAX Risk Assessment  ICSI Preventive Guidelines  Dietary Guidelines for Americans, 2010  USDA's MyPlate  ASA Prophylaxis  Lung CA Screening    Susan Calhoun MD  [facility] for HPI/ROS submitted by the patient on 10/6/2017   PHQ-2 Score: 0    The information in this document, created by the medical scribe La Cates for me, accurately reflects the services I personally performed and the decisions made by me. I have reviewed and approved this document for accuracy prior to leaving the patient care area.

## 2017-10-06 NOTE — MR AVS SNAPSHOT
After Visit Summary   10/6/2017    Shari Graham    MRN: [id number]           Patient Information     Date Of Birth          1950        Visit Information        Provider Department      10/6/2017 9:20 AM Susan Calhoun MD [facility]        Today's Diagnoses     Routine general medical examination at a health care facility    -  1    HTN, goal below 140/90        Memory changes        At risk for falling        Need for prophylactic vaccination and inoculation against influenza        Screening for breast cancer          Care Instructions    We are going to increase Toprol for your blood pressure up to 50 mg daily.  The new pill will be one pill in a larger dose.  The goal is to keep your blood pressure around 130-140/80-90  Please check your blood pressure at home once a week.  If you notice a high blood pressure reading, please check it again in 1-2 hours.  If your blood pressure is staying above 150/100 please call my nurse.    We will start Aricept medication for your memory changes.    And I will look forward to touching base with you about this medication in about 4 weeks.  If you have diarrhea or loss of appetite that feels worrisome to you, please let me know sooner.    Please call [facility] (xray, mammogram, bone density, and ultrasound) [phone]  To schedule your mammogram.        Preventive Health Recommendations  Female Ages 65 +    Yearly exam:     See your health care provider every year in order to  o Review health changes.   o Discuss preventive care.    o Review your medicines if your doctor has prescribed any.      You no longer need a yearly Pap test unless you've had an abnormal Pap test in the past 10 years. If you have vaginal symptoms, such as bleeding or discharge, be sure to talk with your provider about a Pap test.      Every 1 to 2 years, have a mammogram.  If you are over 69, talk with your  health care provider about whether or not you want to continue having screening mammograms.      Every 10 years, have a colonoscopy. Or, have a yearly FIT test (stool test). These exams will check for colon cancer.       Have a cholesterol test every 5 years, or more often if your doctor advises it.       Have a diabetes test (fasting glucose) every three years. If you are at risk for diabetes, you should have this test more often.       At age 65, have a bone density scan (DEXA) to check for osteoporosis (brittle bone disease).    Shots:    Get a flu shot each year.    Get a tetanus shot every 10 years.    Talk to your doctor about your pneumonia vaccines. There are now two you should receive - Pneumovax (PPSV 23) and Prevnar (PCV 13).    Talk to your doctor about the shingles vaccine.    Talk to your doctor about the hepatitis B vaccine.    Nutrition:     Eat at least 5 servings of fruits and vegetables each day.      Eat whole-grain bread, whole-wheat pasta and brown rice instead of white grains and rice.      Talk to your provider about Calcium and Vitamin D.     Lifestyle    Exercise at least 150 minutes a week (30 minutes a day, 5 days a week). This will help you control your weight and prevent disease.      Limit alcohol to one drink per day.      No smoking.       Wear sunscreen to prevent skin cancer.       See your dentist twice a year for an exam and cleaning.      See your eye doctor every 1 to 2 years to screen for conditions such as glaucoma, macular degeneration, cataracts, etc     [facility]   Discharged by : Bela AMARO CMA ([facility])    Paper scripts provided to patient : none      If you have any questions regarding your visit please contact your care team:     [facility] Clinic Hours Telephone Number   Dr. Rosita Centeno   7am-7pm Monday - Thursday   7am-5pm Fridays  [phone]   (Appointment scheduling available 24/7)    RN Line   [phone] option 2       For a Price Quote for your services, please call our Consumer Price Line at [phone].     What options do I have for visits at the clinic other than the traditional office visit?     To expand how we care for you, many of our providers are utilizing electronic visits (e-visits) and telephone visits, when medically appropriate, for interactions with their patients rather than a visit in the clinic. We also offer nurse visits for many medical concerns. Just like any other service, we will bill your insurance company for this type of visit based on time spent on the phone with your provider. Not all insurance companies cover these visits. Please check with your medical insurance if this type of visit is covered. You will be responsible for any charges that are not paid by your insurance.   E-visits via [employer]: generally incur a $35.00 fee.     Telephone visits:   Time spent on the phone: *charged based on time that is spent on the phone in increments of 10 minutes. Estimated cost:   5-10 mins $30.00   11-20 mins. $59.00   21-30 mins. $85.00     Use [employer] (secure email communication and access to your chart) to send your primary care provider a message or make an appointment. Ask someone on your Team how to sign up for [employer].     As always, Thank you for trusting us with your health care needs!      [facility]:    Scheduling Appointments  [facility] [place]  Call: [phone]    [facility]  Call: [phone]    [facility]  Call: [phone]      WHERE TO GO FOR CARE?    Clinic    Make an appointment if you:       Are sick (cold, cough, flu, sore throat, earache or in pain).       Have a small injury (sprain, small cut, burn or broken bone).       Need a physical exam, Pap smear, vaccine or prescription refill.       Have questions about your health or medicines.    To reach  us:      Call [phone] ([phone]). Open 24 hours every day. (For counseling services, call [phone].)    Log into [employer] at [email or web address]. (Visit [email or web address] to create an account.) Hospital emergency room    An emergency is a serious or life- threatening problem that must be treated right away.    Call 911 or get to the hospital if you have:      Very bad or sudden:            - Chest pain or pressure         - Bleeding         - Head or belly pain         - Dizziness or trouble seeing, walking or                          Speaking      Problems breathing      Blood in your vomit or you are coughing up blood      A major injury (knocked out, loss of a finger or limb, rape, broken bone protruding from skin)    A mental health crisis. (Or call the Mental Health Crisis line at 1-991.519.6047 or Suicide Prevention Hotline at 1-465.866.6600.)    Open 24 hours every day. You don't need an appointment.     Urgent care    Visit urgent care for sickness or small injuries when the clinic is closed. You don't need an appointment. To check hours or find an urgent care near you, visit [email or web address]. Online care    Get online care from [facility] for more than 70 common problems, like colds, allergies and infections. Open 24 hours every day at:   [email or web address]   Need help deciding?    For advice about where to be seen, you may call your clinic and ask to speak with a nurse. We're here for you 24 hours every day.         If you are deaf or hard of hearing, please let us know. We provide many free services including sign language interpreters, oral interpreters, TTYs, telephone amplifiers, note takers and written materials.                         Follow-ups after your visit        Who to contact     If you have questions or need follow up information about today's clinic visit or your schedule please contact [facility] directly at [phone].  Normal or non-critical lab and  "imaging results will be communicated to you by MyChart, letter or phone within 4 business days after the clinic has received the results. If you do not hear from us within 7 days, please contact the clinic through OpenSky or phone. If you have a critical or abnormal lab result, we will notify you by phone as soon as possible.  Submit refill requests through OpenSky or call your pharmacy and they will forward the refill request to us. Please allow 3 business days for your refill to be completed.          Additional Information About Your Visit        [employer] Information     OpenSky gives you secure access to your electronic health record. If you see a primary care provider, you can also send messages to your care team and make appointments. If you have questions, please call your primary care clinic.  If you do not have a primary care provider, please call [phone] and they will assist you.        Care EveryWhere ID     This is your Care EveryWhere ID. This could be used by other organizations to access your [facility] medical records  [id number]        Your Vitals Were     Pulse Temperature Height Pulse Oximetry BMI (Body Mass Index)       63 98.1  F (36.7  C) (Oral) 5' 2.5\" (1.588 m) 98% 22.05 kg/m2        Blood Pressure from Last 3 Encounters:   10/06/17 164/80   02/10/17 132/74   09/20/16 130/76    Weight from Last 3 Encounters:   10/06/17 122 lb 8 oz (55.6 kg)   02/10/17 115 lb 6.4 oz (52.3 kg)   09/20/16 110 lb (49.9 kg)              We Performed the Following     ADMIN INFLUENZA (For MEDICARE Patients ONLY) []     BASIC METABOLIC PANEL     CBC with platelets     FLU VACCINE, INCREASED ANTIGEN, PRESV FREE, AGE 65+ [24953]     Folate     Lipid panel reflex to direct LDL     TSH with free T4 reflex     Vitamin B12          Today's Medication Changes          These changes are accurate as of: 10/6/17 10:17 AM.  If you have any questions, ask your nurse or doctor.               Start taking these " medicines.        Dose/Directions    donepezil 5 MG tablet   Commonly known as:  ARICEPT   Used for:  Memory changes   Started by:  Susan Calhoun MD        Dose:  5 mg   Take 1 tablet (5 mg) by mouth At Bedtime   Quantity:  30 tablet   Refills:  1         These medicines have changed or have updated prescriptions.        Dose/Directions    metoprolol 50 MG 24 hr tablet   Commonly known as:  TOPROL-XL   This may have changed:  See the new instructions.   Used for:  HTN, goal below 140/90   Changed by:  Susan Calhoun MD        Dose:  50 mg   Take 1 tablet (50 mg) by mouth daily   Quantity:  90 tablet   Refills:  1            Where to get your medicines      These medications were sent to [facility] - [address].  [address] [place] [address]., [place] [address]     Phone:  [phone]     donepezil 5 MG tablet    metoprolol 50 MG 24 hr tablet                Primary Care Provider Office Phone # Fax #    Susan Calhoun [phone]       [address] [place], [place] [id number]        Equal Access to Services     [facility]: Hadii justus ku hadasho Soomaali, waaxda luqadaha, qaybta kaalmada adeegyada, pacsale de los santos hayrylie vang . So [facility] [phone].    ATENCIÓN: Si habla español, tiene a johnson disposición servicios gratuitos de asistencia lingüística. [facility] [phone].    We comply with applicable federal civil rights laws and [place] laws. We do not discriminate on the basis of race, color, national origin, age, disability, sex, sexual orientation, or gender identity.            Thank you!     Thank you for choosing [facility]  for your care. Our goal is always to provide you with excellent care. Hearing back from our patients is one way we can continue to improve our services. Please take a few minutes to complete the written survey that you may receive in the mail after your visit  with us. Thank you!             Your Updated Medication List - Protect others around you: Learn how to safely use, store and throw away your medicines at www.disposemymeds.org.          This list is accurate as of: 10/6/17 10:17 AM.  Always use your most recent med list.                   Brand Name Dispense Instructions for use Diagnosis    BIOTIN           CALCIUM CARBONATE           donepezil 5 MG tablet    ARICEPT    30 tablet    Take 1 tablet (5 mg) by mouth At Bedtime    Memory changes       escitalopram 20 MG tablet    LEXAPRO    90 tablet    TAKE ONE TABLET BY MOUTH EVERY DAY    Anxiety       FISH OIL           FLAXSEED           GLUCOSAMINE CHOND COMPLEX/MSM Tabs           metoprolol 50 MG 24 hr tablet    TOPROL-XL    90 tablet    Take 1 tablet (50 mg) by mouth daily    HTN, goal below 140/90       MULTIPLE VITAMIN PO           simvastatin 10 MG tablet    ZOCOR    90 tablet    Take 1 tablet (10 mg) by mouth At Bedtime    Hyperlipidemia LDL goal <130       traZODone 50 MG tablet    DESYREL    60 tablet    TAKE ONE-HALF TO TWO TABLETS BY MOUTH NIGHTLY AS NEEDED FOR INSOMNIA    Anxiety, Insomnia       VITAMIN D PO           VITAMIN E

## 2017-10-06 NOTE — NURSING NOTE
"Chief Complaint   Patient presents with     Wellness Visit       Initial /80 (BP Location: Left arm, Patient Position: Sitting, Cuff Size: Adult Regular)  Pulse 63  Temp 98.1  F (36.7  C) (Oral)  Ht 5' 2.5\" (1.588 m)  Wt 122 lb 8 oz (55.6 kg)  SpO2 98%  BMI 22.05 kg/m2 Estimated body mass index is 22.05 kg/(m^2) as calculated from the following:    Height as of this encounter: 5' 2.5\" (1.588 m).    Weight as of this encounter: 122 lb 8 oz (55.6 kg).  Medication Reconciliation: complete     Rosita DÍAZ, Certified Medical Assistant (AAMA)October 6, 2017 9:26 AM      "

## 2017-10-06 NOTE — PATIENT INSTRUCTIONS
We are going to increase Toprol for your blood pressure up to 50 mg daily.  The new pill will be one pill in a larger dose.  The goal is to keep your blood pressure around 130-140/80-90  Please check your blood pressure at home once a week.  If you notice a high blood pressure reading, please check it again in 1-2 hours.  If your blood pressure is staying above 150/100 please call my nurse.    We will start Aricept medication for your memory changes.    And I will look forward to touching base with you about this medication in about 4 weeks.  If you have diarrhea or loss of appetite that feels worrisome to you, please let me know sooner.    Please call [facility] (xray, mammogram, bone density, and ultrasound) [phone]  To schedule your mammogram.        Preventive Health Recommendations  Female Ages 65 +    Yearly exam:     See your health care provider every year in order to  o Review health changes.   o Discuss preventive care.    o Review your medicines if your doctor has prescribed any.      You no longer need a yearly Pap test unless you've had an abnormal Pap test in the past 10 years. If you have vaginal symptoms, such as bleeding or discharge, be sure to talk with your provider about a Pap test.      Every 1 to 2 years, have a mammogram.  If you are over 69, talk with your health care provider about whether or not you want to continue having screening mammograms.      Every 10 years, have a colonoscopy. Or, have a yearly FIT test (stool test). These exams will check for colon cancer.       Have a cholesterol test every 5 years, or more often if your doctor advises it.       Have a diabetes test (fasting glucose) every three years. If you are at risk for diabetes, you should have this test more often.       At age 65, have a bone density scan (DEXA) to check for osteoporosis (brittle bone disease).    Shots:    Get a flu shot each year.    Get a tetanus shot every 10  years.    Talk to your doctor about your pneumonia vaccines. There are now two you should receive - Pneumovax (PPSV 23) and Prevnar (PCV 13).    Talk to your doctor about the shingles vaccine.    Talk to your doctor about the hepatitis B vaccine.    Nutrition:     Eat at least 5 servings of fruits and vegetables each day.      Eat whole-grain bread, whole-wheat pasta and brown rice instead of white grains and rice.      Talk to your provider about Calcium and Vitamin D.     Lifestyle    Exercise at least 150 minutes a week (30 minutes a day, 5 days a week). This will help you control your weight and prevent disease.      Limit alcohol to one drink per day.      No smoking.       Wear sunscreen to prevent skin cancer.       See your dentist twice a year for an exam and cleaning.      See your eye doctor every 1 to 2 years to screen for conditions such as glaucoma, macular degeneration, cataracts, etc     [facility]   Discharged by : Bela AMARO CMA ([facility])    Paper scripts provided to patient : none      If you have any questions regarding your visit please contact your care team:     [facility] Clinic Hours Telephone Number   Dr. Rosita Centeno   7am-7pm Monday - Thursday   7am-5pm Fridays  [phone]   (Appointment scheduling available 24/7)   RN Line   [phone] option 2       For a Price Quote for your services, please call our Consumer Price Line at [phone].     What options do I have for visits at the clinic other than the traditional office visit?     To expand how we care for you, many of our providers are utilizing electronic visits (e-visits) and telephone visits, when medically appropriate, for interactions with their patients rather than a visit in the clinic. We also offer nurse visits for many medical concerns. Just like any other service, we will bill your insurance company for this type of visit based on time  spent on the phone with your provider. Not all insurance companies cover these visits. Please check with your medical insurance if this type of visit is covered. You will be responsible for any charges that are not paid by your insurance.   E-visits via [employer]: generally incur a $35.00 fee.     Telephone visits:   Time spent on the phone: *charged based on time that is spent on the phone in increments of 10 minutes. Estimated cost:   5-10 mins $30.00   11-20 mins. $59.00   21-30 mins. $85.00     Use [employer] (secure email communication and access to your chart) to send your primary care provider a message or make an appointment. Ask someone on your Team how to sign up for [employer].     As always, Thank you for trusting us with your health care needs!      [facility]:    Scheduling Appointments  [facility] [place]  Call: [phone]    [facility]  Call: [phone]    [facility]  Call: [phone]      WHERE TO GO FOR CARE?    Clinic    Make an appointment if you:       Are sick (cold, cough, flu, sore throat, earache or in pain).       Have a small injury (sprain, small cut, burn or broken bone).       Need a physical exam, Pap smear, vaccine or prescription refill.       Have questions about your health or medicines.    To reach us:      Call [phone] ([phone]). Open 24 hours every day. (For counseling services, call [phone].)    Log into [employer] at [email or web address]. (Visit [email or web address] to create an account.) Hospital emergency room    An emergency is a serious or life- threatening problem that must be treated right away.    Call 636 or get to the hospital if you have:      Very bad or sudden:            - Chest pain or pressure         - Bleeding         - Head or belly pain         - Dizziness or trouble seeing, walking or                          Speaking      Problems breathing      Blood in  your vomit or you are coughing up blood      A major injury (knocked out, loss of a finger or limb, rape, broken bone protruding from skin)    A mental health crisis. (Or call the Mental Health Crisis line at 1-249.398.2376 or Suicide Prevention Hotline at 1-551.595.8619.)    Open 24 hours every day. You don't need an appointment.     Urgent care    Visit urgent care for sickness or small injuries when the clinic is closed. You don't need an appointment. To check hours or find an urgent care near you, visit [email or web address]. Online care    Get online care from [facility] for more than 70 common problems, like colds, allergies and infections. Open 24 hours every day at:   [email or web address]   Need help deciding?    For advice about where to be seen, you may call your clinic and ask to speak with a nurse. We're here for you 24 hours every day.         If you are deaf or hard of hearing, please let us know. We provide many free services including sign language interpreters, oral interpreters, TTYs, telephone amplifiers, note takers and written materials.

## 2017-10-07 LAB
ANION GAP SERPL CALCULATED.3IONS-SCNC: 9 MMOL/L (ref 3–14)
BUN SERPL-MCNC: 13 MG/DL (ref 7–30)
CALCIUM SERPL-MCNC: 9 MG/DL (ref 8.5–10.1)
CHLORIDE SERPL-SCNC: 105 MMOL/L (ref 94–109)
CHOLEST SERPL-MCNC: 212 MG/DL
CO2 SERPL-SCNC: 25 MMOL/L (ref 20–32)
CREAT SERPL-MCNC: 0.73 MG/DL (ref 0.52–1.04)
GFR SERPL CREATININE-BSD FRML MDRD: 79 ML/MIN/1.7M2
GLUCOSE SERPL-MCNC: 104 MG/DL (ref 70–99)
HDLC SERPL-MCNC: 97 MG/DL
LDLC SERPL CALC-MCNC: 99 MG/DL
NONHDLC SERPL-MCNC: 115 MG/DL
POTASSIUM SERPL-SCNC: 4.3 MMOL/L (ref 3.4–5.3)
SODIUM SERPL-SCNC: 139 MMOL/L (ref 133–144)
TRIGL SERPL-MCNC: 78 MG/DL
TSH SERPL DL<=0.005 MIU/L-ACNC: 3.41 MU/L (ref 0.4–4)

## 2017-10-08 PROBLEM — R73.09 ABNORMAL BLOOD SUGAR: Status: ACTIVE | Noted: 2017-10-08

## 2017-10-16 ENCOUNTER — RADIANT APPOINTMENT (OUTPATIENT)
Dept: MAMMOGRAPHY | Facility: CLINIC | Age: 67
End: 2017-10-16
Attending: FAMILY MEDICINE
Payer: COMMERCIAL

## 2017-10-16 DIAGNOSIS — Z12.39 SCREENING FOR BREAST CANCER: ICD-10-CM

## 2017-10-16 PROCEDURE — G0202 SCR MAMMO BI INCL CAD: HCPCS | Mod: TC

## 2017-10-17 DIAGNOSIS — E78.5 HYPERLIPIDEMIA LDL GOAL <130: ICD-10-CM

## 2017-10-17 RX ORDER — SIMVASTATIN 10 MG
TABLET ORAL
Qty: 90 TABLET | Refills: 3 | Status: SHIPPED | OUTPATIENT
Start: 2017-10-17 | End: 2018-11-25

## 2017-10-17 NOTE — TELEPHONE ENCOUNTER
Prescription approved per [facility] Refill Protocol.    Chani Rivas RN  [facility]

## 2017-10-17 NOTE — TELEPHONE ENCOUNTER
Medication Detail      Disp Refills Start End DENIS   simvastatin (ZOCOR) 10 MG tablet 90 tablet 3 9/20/2016  No   Sig: Take 1 tablet (10 mg) by mouth At Bedtime   Class: E-Prescribe   Notes to Pharmacy: Profile Rx: patient will contact pharmacy when needed   Route: Oral   Order: [id number]       Last Office Visit with [facility], [facility] or [facility] prescribing provider: 10/6/2017  Next 5 appointments (look out 90 days)     Nov 03, 2017 11:40 AM CDT   SHORT with Susan Calhoun MD   [facility]    [address] [place] [id number] [phone]                   Lab Results   Component Value Date    CHOL 212 10/06/2017     Lab Results   Component Value Date    HDL 97 10/06/2017     Lab Results   Component Value Date    LDL 99 10/06/2017     Lab Results   Component Value Date    TRIG 78 10/06/2017     Lab Results   Component Value Date    CHOLHDLRATIO 2.3 09/14/2015

## 2017-11-03 ENCOUNTER — OFFICE VISIT (OUTPATIENT)
Dept: FAMILY MEDICINE | Facility: CLINIC | Age: 67
End: 2017-11-03
Payer: COMMERCIAL

## 2017-11-03 VITALS
HEIGHT: 63 IN | WEIGHT: 123.5 LBS | OXYGEN SATURATION: 99 % | BODY MASS INDEX: 21.88 KG/M2 | DIASTOLIC BLOOD PRESSURE: 75 MMHG | TEMPERATURE: 97.4 F | SYSTOLIC BLOOD PRESSURE: 143 MMHG | HEART RATE: 59 BPM

## 2017-11-03 DIAGNOSIS — I10 HTN, GOAL BELOW 140/90: ICD-10-CM

## 2017-11-03 DIAGNOSIS — R41.3 MEMORY CHANGES: Primary | ICD-10-CM

## 2017-11-03 PROCEDURE — 99214 OFFICE O/P EST MOD 30 MIN: CPT | Performed by: FAMILY MEDICINE

## 2017-11-03 RX ORDER — DONEPEZIL HYDROCHLORIDE 5 MG/1
5 TABLET, FILM COATED ORAL AT BEDTIME
Qty: 90 TABLET | Refills: 1 | Status: SHIPPED | OUTPATIENT
Start: 2017-11-03 | End: 2018-02-23 | Stop reason: SINTOL

## 2017-11-03 NOTE — PATIENT INSTRUCTIONS
Please consider brining your blood pressure cuff into our pharmacy to have our pharmacist compare it to our machine.  That way we will know if home blood pressures are accurate.  Overall your home blood pressures look good.    Please schedule a neurology consultation to make sure we are on the right track with your memory changes and our plans.    [facility]   Discharged by : Marlee LARKIN MA    If you have any questions regarding your visit please contact your care team:     [facility] Clinic Hours Telephone Number   Dr. Rosita Rizzo   7am-7pm Monday - Thursday   7am-5pm Fridays  [phone]   (Appointment scheduling available 24/7)   RN Line   [phone] option 2       For a Price Quote for your services, please call our Consumer Price Line at [phone].     What options do I have for visits at the clinic other than the traditional office visit?     To expand how we care for you, many of our providers are utilizing electronic visits (e-visits) and telephone visits, when medically appropriate, for interactions with their patients rather than a visit in the clinic. We also offer nurse visits for many medical concerns. Just like any other service, we will bill your insurance company for this type of visit based on time spent on the phone with your provider. Not all insurance companies cover these visits. Please check with your medical insurance if this type of visit is covered. You will be responsible for any charges that are not paid by your insurance.   E-visits via [employer]: generally incur a $35.00 fee.     Telephone visits:   Time spent on the phone: *charged based on time that is spent on the phone in increments of 10 minutes. Estimated cost:   5-10 mins $30.00   11-20 mins. $59.00   21-30 mins. $85.00     Use [employer] (secure email communication and access to your chart) to send your primary care provider a  message or make an appointment. Ask someone on your Team how to sign up for [employer].     As always, Thank you for trusting us with your health care needs!      [facility]:    Scheduling Appointments  [place] [place], [facility]  Call: [phone]    Bryan Storey, [facility]  Call: [phone]    [facility]  Call: [phone]    For Gastroenterology referrals   [facility] G[facility]enter[facility]   Clinics and Surgery [facility], 4th Floor   [address] [place] [address]   Appointments: [phone]    WHERE TO GO FOR CARE?  Clinic    Make an appointment if you:       Are sick (cold, cough, flu, sore throat, earache or in pain).       Have a small injury (sprain, small cut, burn or broken bone).       Need a physical exam, Pap smear, vaccine or prescription refill.       Have questions about your health or medicines.    To reach us:      Call [phone] ([phone]). Open 24 hours every day. (For counseling services, call [phone].)    Log into [employer] at [email or web address]. (Visit [email or web address] to create an account.) Hospital emergency room    An emergency is a serious or life- threatening problem that must be treated right away.    Call [phone] or get to the hospital if you have:      Very bad or sudden:            - Chest pain or pressure         - Bleeding         - Head or belly pain         - Dizziness or trouble seeing, walking or                          Speaking      Problems breathing      Blood in your vomit or you are coughing up blood      A major injury (knocked out, loss of a finger or limb, rape, broken bone protruding from skin)    A mental health crisis. (Or call the Mental Health Crisis line at 1-261.575.7641 or Suicide Prevention Hotline at 1-925.237.3569.)    Open 24 hours every day. You don't need an appointment.     Urgent care    Visit urgent care for sickness or small injuries when the clinic is closed. You  don't need an appointment. To check hours or find an urgent care near you, visit [email or web address]. Online care    Get online care from [facility] for more than 70 common problems, like colds, allergies and infections. Open 24 hours every day at:   [email or web address]   Need help deciding?    For advice about where to be seen, you may call your clinic and ask to speak with a nurse. We're here for you 24 hours every day.         If you are deaf or hard of hearing, please let us know. We provide many free services including sign language interpreters, oral interpreters, TTYs, telephone amplifiers, note takers and written materials.

## 2017-11-03 NOTE — MR AVS SNAPSHOT
After Visit Summary   11/3/2017    Shari Graham    MRN: [id number]           Patient Information     Date Of Birth          1950        Visit Information        Provider Department      11/3/2017 11:40 AM Susan Calhoun MD [facility]        Today's Diagnoses     Memory changes    -  1    HTN, goal below 140/90          Care Instructions    Please consider brining your blood pressure cuff into our pharmacy to have our pharmacist compare it to our machine.  That way we will know if home blood pressures are accurate.  Overall your home blood pressures look good.    Please schedule a neurology consultation to make sure we are on the right track with your memory changes and our plans.    [facility]   Discharged by : Marlee LARKIN MA    If you have any questions regarding your visit please contact your care team:     [facility] Clinic Hours Telephone Number   Dr. Rosita Rizzo   7am-7pm Monday - Thursday   7am-5pm Fridays  [phone]   (Appointment scheduling available 24/7)   RN Line   [phone] option 2       For a Price Quote for your services, please call our Consumer Price Line at [phone].     What options do I have for visits at the clinic other than the traditional office visit?     To expand how we care for you, many of our providers are utilizing electronic visits (e-visits) and telephone visits, when medically appropriate, for interactions with their patients rather than a visit in the clinic. We also offer nurse visits for many medical concerns. Just like any other service, we will bill your insurance company for this type of visit based on time spent on the phone with your provider. Not all insurance companies cover these visits. Please check with your medical insurance if this type of visit is covered. You will be responsible for any charges  that are not paid by your insurance.   E-visits via [employer]: generally incur a $35.00 fee.     Telephone visits:   Time spent on the phone: *charged based on time that is spent on the phone in increments of 10 minutes. Estimated cost:   5-10 mins $30.00   11-20 mins. $59.00   21-30 mins. $85.00     Use [employer]t (secure email communication and access to your chart) to send your primary care provider a message or make an appointment. Ask someone on your Team how to sign up for [employer].     As always, Thank you for trusting us with your health care needs!      [facility]:    Scheduling Appointments  Laina Clark [facility]  Call: [phone]    Bryan Storey [facility]  Call: [phone]    [facility]  Call: [phone]    For Gastroenterology referrals   [facility] Gastroenterology   Clinics and Surgery Center, 4th Floor   [address] [place] [address]   Appointments: [phone]    WHERE TO GO FOR CARE?  Clinic    Make an appointment if you:       Are sick (cold, cough, flu, sore throat, earache or in pain).       Have a small injury (sprain, small cut, burn or broken bone).       Need a physical exam, Pap smear, vaccine or prescription refill.       Have questions about your health or medicines.    To reach us:      Call [phone] ([phone]). Open 24 hours every day. (For counseling services, call [phone].)    Log into [employer] at [email or web address]. (Visit [email or web address] to create an account.) Hospital emergency room    An emergency is a serious or life- threatening problem that must be treated right away.    Call [phone] or get to the hospital if you have:      Very bad or sudden:            - Chest pain or pressure         - Bleeding         - Head or belly pain         - Dizziness or trouble seeing, walking or                          Speaking      Problems breathing      Blood in your vomit or you are coughing  up blood      A major injury (knocked out, loss of a finger or limb, rape, broken bone protruding from skin)    A mental health crisis. (Or call the Mental Health Crisis line at 1-792.302.3985 or Suicide Prevention Hotline at 1-780.708.3806.)    Open 24 hours every day. You don't need an appointment.     Urgent care    Visit urgent care for sickness or small injuries when the clinic is closed. You don't need an appointment. To check hours or find an urgent care near you, visit [email or web address]. Online care    Get online care from [facility] for more than 70 common problems, like colds, allergies and infections. Open 24 hours every day at:   [email or web address]   Need help deciding?    For advice about where to be seen, you may call your clinic and ask to speak with a nurse. We're here for you 24 hours every day.         If you are deaf or hard of hearing, please let us know. We provide many free services including sign language interpreters, oral interpreters, TTYs, telephone amplifiers, note takers and written materials.                   Follow-ups after your visit        Additional Services     NEUROLOGY ADULT REFERRAL       Your provider has referred you for the following:   Consult at [facility]: [facility] [phone]   [email or web address]  Dr. Jones    Please be aware that coverage of these services is subject to the terms and limitations of your health insurance plan.  Call member services at your health plan with any benefit or coverage questions.      Please bring the following with you to your appointment:    (1) Any X-Rays, CTs or MRIs which have been performed.  Contact the facility where they were done to arrange for  prior to your scheduled appointment.    (2) List of current medications  (3) This referral request   (4) Any documents/labs given to you for this referral                  Follow-up notes from your care team     Return in  "about 3 months (around 2/3/2018).      Who to contact     If you have questions or need follow up information about today's clinic visit or your schedule please contact [facility] directly at [phone].  Normal or non-critical lab and imaging results will be communicated to you by MyChart, letter or phone within 4 business days after the clinic has received the results. If you do not hear from us within 7 days, please contact the clinic through Conductivhart or phone. If you have a critical or abnormal lab result, we will notify you by phone as soon as possible.  Submit refill requests through DocASAP or call your pharmacy and they will forward the refill request to us. Please allow 3 business days for your refill to be completed.          Additional Information About Your Visit        DocASAP Information     Doc[employer]AP gives you secure access to your electronic health record. If you see a primary care provider, you can also send messages to your care team and make appointments. If you have questions, please call your primary care clinic.  If you do not have a primary care provider, please call [phone] and they will assist you.        Care EveryWhere ID     This is your Care EveryWhere ID. This could be used by other organizations to access your [facility] [place] medical records  [id number]        Your Vitals Were     Pulse Temperature Height Pulse Oximetry BMI (Body Mass Index)       59 97.4  F (36.3  C) (Oral) 5' 2.5\" (1.588 m) 99% 22.23 kg/m2        Blood Pressure from Last 3 Encounters:   11/03/17 143/75   10/06/17 150/72   02/10/17 132/74    Weight from Last 3 Encounters:   11/03/17 123 lb 8 oz (56 kg)   10/06/17 122 lb 8 oz (55.6 kg)   02/10/17 115 lb 6.4 oz (52.3 kg)              We Performed the Following     NEUROLOGY ADULT REFERRAL          Where to get your medicines      These medications were [place] [facility] [place] - [address]  [address] [facility] [address]     Phone:  [phone]     donepezil 5 MG tablet          Primary Care Provider Office Phone # Fax #    Susan Rupali Calhoun [phone]       [facility] [address]        Equal Access to Services     NATASHA BARAKAT : Hadii aad ku hadasho Soomaali, waaxda luqadaha, qaybta kaalmada adeegyada, waxay jasperin hayaan adeeg abelennoxmariluz lashanita . So [facility] [phone].    ATENCIÓN: Si habla español, tiene a johnson disposición servicios gratuitos de asistencia lingüística. Llame al [phone].    We comply with applicable federal civil rights laws and [place] laws. We do not discriminate on the basis of race, color, national origin, age, disability, sex, sexual orientation, or gender identity.            Thank you!     Thank you for choosing [facility]  for your care. Our goal is always to provide you with excellent care. Hearing back from our patients is one way we can continue to improve our services. Please take a few minutes to complete the written survey that you may receive in the mail after your visit with us. Thank you!             Your Updated Medication List - Protect others around you: Learn how to safely use, store and throw away your medicines at www.disposemymeds.org.          This list is accurate as of: 11/3/17 12:19 PM.  Always use your most recent med list.                   Brand Name Dispense Instructions for use Diagnosis    BIOTIN           CALCIUM CARBONATE           donepezil 5 MG tablet    ARICEPT    90 tablet    Take 1 tablet (5 mg) by mouth At Bedtime    Memory changes       escitalopram 20 MG tablet    LEXAPRO    90 tablet    TAKE ONE TABLET BY MOUTH EVERY DAY    Anxiety       FISH OIL           FLAXSEED           GLUCOSAMINE CHOND COMPLEX/MSM Tabs           metoprolol 50 MG 24 hr tablet    TOPROL-XL    90 tablet    Take 1 tablet (50 mg) by mouth daily    HTN, goal below 140/90       MULTIPLE VITAMIN PO            simvastatin 10 MG tablet    ZOCOR    90 tablet    TAKE ONE TABLET BY MOUTH AT BEDTIME    Hyperlipidemia LDL goal <130       traZODone 50 MG tablet    DESYREL    60 tablet    TAKE ONE-HALF TO TWO TABLETS BY MOUTH NIGHTLY AS NEEDED FOR INSOMNIA    Anxiety, Insomnia       VITAMIN D PO           VITAMIN E

## 2017-11-03 NOTE — PROGRESS NOTES
SUBJECTIVE:   Shari Graham is a 67 year old female who presents to clinic today for the following health issues:      Hypertension Follow-up      Outpatient blood pressures are being checked at home.  Results are 110-140/56-74. Only one home reading at 140 - all others well below    Low Salt Diet: low salt    She is concerned that her cuff at home may need to be calibrated as she says that it is about 8-9 years old. Her BP has been normal at home.     Memory Changes:  She states that she has had diarrhea with beginning aricept, but she states that this has resolved. She states that she does have a few instances since it has gotten better, but nothing like it was at the beginning. She states that she has not seen a change in her appetite. She states that she has been more tired lately. Her  states that she will sometimes take a nap during the day, but she seems to be in a deeper sleep that she should during the day. She says that they used to cut her trazodone in half at night, but she has been taking a whole one because it is easier that way. She is worried that this, or the Aricept could be causing this drowsiness. She states she is still sleeping well at night and that she feels comfortable in her bed at night.         Amount of exercise or physical activity: 1 day/week for an average of 45-60 minutes, walking 3 miles most days    Problems taking medications regularly: No    Medication side effects: She seems to be tired a lot since adding new pill    Diet: low salt        Problem list and histories reviewed & adjusted, as indicated.  Additional history: as documented    Patient Active Problem List   Diagnosis     Hyperlipidemia LDL goal <130     HTN, goal below 140/90     Adenomatous colon polyp     Anxiety     Abnormal blood sugar     Past Surgical History:   Procedure Laterality Date     APPENDECTOMY       COLONOSCOPY       HYSTERECTOMY, PAP NO LONGER INDICATED  1995    for reasons of fibroids      SURGICAL HISTORY OF -   grade school    neck cyst removed     SURGICAL HISTORY OF -   2011    right cyst removed on wrist       Social History   Substance Use Topics     Smoking status: Former Smoker     Packs/day: 0.50     Years: 11.00     Types: Cigarettes     Quit date: 7/27/1981     Smokeless tobacco: Never Used     Alcohol use Yes      Comment: 2/wk     Family History   Problem Relation Age of Onset     HEART DISEASE Mother      DIABETES Mother      Hypertension Mother      CANCER Father      Respiratory Father      Alzheimer Disease Paternal Grandmother      Prostate Cancer Paternal Grandfather      Prostate Cancer Brother      Breast Cancer Sister      DIABETES Maternal Grandmother          Current Outpatient Prescriptions   Medication Sig Dispense Refill     simvastatin (ZOCOR) 10 MG tablet TAKE ONE TABLET BY MOUTH AT BEDTIME 90 tablet 3     metoprolol (TOPROL-XL) 50 MG 24 hr tablet Take 1 tablet (50 mg) by mouth daily 90 tablet 1     donepezil (ARICEPT) 5 MG tablet Take 1 tablet (5 mg) by mouth At Bedtime 30 tablet 1     escitalopram (LEXAPRO) 20 MG tablet TAKE ONE TABLET BY MOUTH EVERY DAY 90 tablet 0     traZODone (DESYREL) 50 MG tablet TAKE ONE-HALF TO TWO TABLETS BY MOUTH NIGHTLY AS NEEDED FOR INSOMNIA 60 tablet 2     CALCIUM CARBONATE        Cholecalciferol (VITAMIN D PO)        VITAMIN E        FISH OIL        FLAXSEED        MULTIPLE VITAMIN PO        BIOTIN        Misc Natural Products (GLUCOSAMINE CHOND COMPLEX/MSM) TABS        No Known Allergies      Reviewed and updated as needed this visit by clinical staff  Tobacco  Allergies  Med Hx  Surg Hx  Fam Hx  Soc Hx      Reviewed and updated as needed this visit by Provider  Allergies  Meds  Problems         ROS:  Constitutional, HEENT, cardiovascular, pulmonary, gi and gu systems are negative, except as otherwise noted.    This document serves as a record of the services and decisions personally performed by SHANTEL BARTHOLOMEW. It  "was created on his/her behalf by La Cates, a trained medical scribe. The creation of this document is based on the provider's statements to the medical scribe. La Cates, November 3, 2017 11:52 AM    OBJECTIVE:   /75 (BP Location: Left arm, Patient Position: Sitting, Cuff Size: Adult Regular)  Pulse 59  Temp 97.4  F (36.3  C) (Oral)  Ht 1.588 m (5' 2.5\")  Wt 56 kg (123 lb 8 oz)  SpO2 99%  BMI 22.23 kg/m2  Body mass index is 22.23 kg/(m^2).  GENERAL: healthy, alert and no distress  RESP: lungs clear to auscultation - no rales, rhonchi or wheezes  CV: regular rate and rhythm, normal S1 S2, no S3 or S4, no murmur, click or rub, no peripheral edema and peripheral pulses strong  PSYCH: mentation appears normal, affect normal/bright    Diagnostic Test Results:  No results found for this or any previous visit (from the past 24 hour(s)).    ASSESSMENT/PLAN:     1. Memory changes  Patient hasn't noticed any appetite changes with beginning aricept. She experienced some diarrhea when beginning, but it has improved. I referred her to neurology as I would like to be sure that we are on the right path with her medication before increasing the dose, given her young age at onset.      (Neurology please see my last office note.  Patient and her  have been quite resistant to head imaging or neuropsych testing)  - NEUROLOGY ADULT REFERRAL  - donepezil (ARICEPT) 5 MG tablet; Take 1 tablet (5 mg) by mouth At Bedtime  Dispense: 90 tablet; Refill: 1    2. HTN, goal below 140/90  Well controlled on current medications. BP was slightly elevated in clinic today, but her at home readings have all been normal. I think this is situational. I advised her to go to the pharmacy with the cuff she has been using at home and have them check her BP there to compare and make sure it is still working properly. Continue current medications.    RTC 3 months        There are no Patient Instructions on file for this visit.    Susan" Rupali Calhoun MD  [employer]    The information in this document, created by the medical scribe La Cates for me, accurately reflects the services I personally performed and the decisions made by me. I have reviewed and approved this document for accuracy prior to leaving the patient care area.

## 2017-11-03 NOTE — NURSING NOTE
"Chief Complaint   Patient presents with     Recheck Medication       Initial /75 (BP Location: Left arm, Patient Position: Sitting, Cuff Size: Adult Regular)  Pulse 59  Temp 97.4  F (36.3  C) (Oral)  Ht 5' 2.5\" (1.588 m)  Wt 123 lb 8 oz (56 kg)  SpO2 99%  BMI 22.23 kg/m2 Estimated body mass index is 22.23 kg/(m^2) as calculated from the following:    Height as of this encounter: 5' 2.5\" (1.588 m).    Weight as of this encounter: 123 lb 8 oz (56 kg).  Medication Reconciliation: complete     Rosita DÍAZ, Certified Medical Assistant (AAMA)November 3, 2017 11:38 AM      "

## 2017-12-02 DIAGNOSIS — F41.9 ANXIETY: ICD-10-CM

## 2017-12-04 NOTE — TELEPHONE ENCOUNTER
escitalopram (LEXAPRO) 20 MG tablet    0 ordered  Edit     Summary: TAKE ONE TABLET BY MOUTH EVERY DAY, Disp-90 tablet, R-0, E-Prescribe   Start: 9/1/2017  Ord/Sold: 9/1/2017 (O)  Report  Taking:   Long-term:   Pharmacy: [facility] [place] - [place] - [address] [place] [address] Rd.  Med Dose History       Patient Sig: TAKE ONE TABLET BY MOUTH EVERY DAY       Ordered on: 9/1/2017       Authorized by: SHANTEL BARTHOLOMEW       Dispense: 90 tablet        LOV: 11/3/2017

## 2017-12-05 RX ORDER — ESCITALOPRAM OXALATE 20 MG/1
TABLET ORAL
Qty: 90 TABLET | Refills: 3 | Status: SHIPPED | OUTPATIENT
Start: 2017-12-05 | End: 2018-09-26

## 2017-12-05 NOTE — TELEPHONE ENCOUNTER
Prescription approved per [facility] Refill Protocol.  Liat Delarosa RN

## 2018-02-23 ENCOUNTER — OFFICE VISIT (OUTPATIENT)
Dept: FAMILY MEDICINE | Facility: CLINIC | Age: 68
End: 2018-02-23
Payer: COMMERCIAL

## 2018-02-23 VITALS
HEIGHT: 63 IN | TEMPERATURE: 98.1 F | SYSTOLIC BLOOD PRESSURE: 132 MMHG | BODY MASS INDEX: 22.5 KG/M2 | WEIGHT: 127 LBS | DIASTOLIC BLOOD PRESSURE: 74 MMHG | HEART RATE: 70 BPM

## 2018-02-23 DIAGNOSIS — I10 HTN, GOAL BELOW 140/90: Primary | ICD-10-CM

## 2018-02-23 DIAGNOSIS — R41.3 MEMORY CHANGES: ICD-10-CM

## 2018-02-23 DIAGNOSIS — G47.00 INSOMNIA, UNSPECIFIED TYPE: ICD-10-CM

## 2018-02-23 DIAGNOSIS — F41.9 ANXIETY: ICD-10-CM

## 2018-02-23 PROCEDURE — 99214 OFFICE O/P EST MOD 30 MIN: CPT | Performed by: FAMILY MEDICINE

## 2018-02-23 RX ORDER — TRAZODONE HYDROCHLORIDE 50 MG/1
TABLET, FILM COATED ORAL
Qty: 60 TABLET | Refills: 2 | Status: SHIPPED | OUTPATIENT
Start: 2018-02-23 | End: 2018-08-22

## 2018-02-23 RX ORDER — METOPROLOL SUCCINATE 50 MG/1
50 TABLET, EXTENDED RELEASE ORAL DAILY
Qty: 90 TABLET | Refills: 1 | Status: SHIPPED | OUTPATIENT
Start: 2018-02-23 | End: 2018-10-26

## 2018-02-23 NOTE — MR AVS SNAPSHOT
After Visit Summary   2/23/2018    Shari Graham    MRN: [id number]           Patient Information     Date Of Birth          1950        Visit Information        Provider Department      2/23/2018 9:20 AM Susan Calhoun MD [facility]        Today's Diagnoses     HTN, goal below 140/90    -  1    Memory changes        Anxiety        Insomnia, unspecified type          Care Instructions    [facility]   Discharged by : Kimberlee Lima MA    Paper scripts provided to patient : no     If you have any questions regarding your visit please contact your care team:     Team Gold                Clinic Hours Telephone Number     Dr. Rosita Rizzo 7am-7pm  Monday - Thursday   7am-5pm  Fridays  [phone]   (Appointment scheduling available 24/7)     RN Line  [phone] option 2     Urgent Care - [place] - 11am-9pm Monday-Friday Saturday-Sunday- 9am-5pm     [place] -   5pm-9pm Monday-Friday Saturday-Sunday- 9am-5pm    [phone] - [place]    [phone] - [place]       For a Price Quote for your services, please call our Consumer Price Line at [phone].     What options do I have for visits at the clinic other than the traditional office visit?     To expand how we care for you, many of our providers are utilizing electronic visits (e-visits) and telephone visits, when medically appropriate, for interactions with their patients rather than a visit in the clinic. We also offer nurse visits for many medical concerns. Just like any other service, we will bill your insurance company for this type of visit based on time spent on the phone with your provider. Not all insurance companies cover these visits. Please check with your medical insurance if this type of visit is covered. You will be responsible for any charges  that are not paid by your insurance.   E-visits via [employer]: generally incur a $35.00 fee.     Telephone visits:   Time spent on the phone: *charged based on time that is spent on the phone in increments of 10 minutes. Estimated cost:   5-10 mins $30.00   11-20 mins. $59.00   21-30 mins. $85.00     Use GetMeMediat (secure email communication and access to your chart) to send your primary care provider a message or make an appointment. Ask someone on your Team how to sign up for [employer].     As always, Thank you for trusting us with your health care needs!      [facility]:    Scheduling Appointments  Laina Clark [facility]  Call: [phone]    Bryan Storey [facility]  Call: [phone]    [facility]  Call: [phone]    For Gastroenterology referrals   [facility]enterology   Clinics and Surgery Center, 4th Floor   [address] [place] [address]   Appointments: [phone]    WHERE TO GO FOR CARE?  Clinic    Make an appointment if you:       Are sick (cold, cough, flu, sore throat, earache or in pain).       Have a small injury (sprain, small cut, burn or broken bone).       Need a physical exam, Pap smear, vaccine or prescription refill.       Have questions about your health or medicines.    To reach us:      Call [phone] ([phone]). Open 24 hours every day. (For counseling services, call [phone].)    Log into [employer] at [email or web address]. (Visit [email or web address] to create an account.) Hospital emergency room    An emergency is a serious or life- threatening problem that must be treated right away.    Call 427 or get to the hospital if you have:      Very bad or sudden:            - Chest pain or pressure         - Bleeding         - Head or belly pain         - Dizziness or trouble seeing, walking or                          Speaking      Problems breathing      Blood in your vomit or you are coughing  up blood      A major injury (knocked out, loss of a finger or limb, rape, broken bone protruding from skin)    A mental health crisis. (Or call the Mental Health Crisis line at 1-143.917.1034 or Suicide Prevention Hotline at 1-148.647.1415.)    Open 24 hours every day. You don't need an appointment.     Urgent care    Visit urgent care for sickness or small injuries when the clinic is closed. You don't need an appointment. To check hours or find an urgent care near you, visit [email or web address]. Online care    Get online care from [facility] for more than 70 common problems, like colds, allergies and infections. Open 24 hours every day at:   [email or web address]   Need help deciding?    For advice about where to be seen, you may call your clinic and ask to speak with a nurse. We're here for you 24 hours every day.         If you are deaf or hard of hearing, please let us know. We provide many free services including sign language interpreters, oral interpreters, TTYs, telephone amplifiers, note takers and written materials.                   Follow-ups after your visit        Follow-up notes from your care team     Return in about 6 months (around 8/23/2018) for Physical Exam, BP Recheck.      Who to contact     If you have questions or need follow up information about today's clinic visit or your schedule please contact [facility] [place] directly at [phone].  Normal or non-critical lab and imaging results will be communicated to you by MyChart, letter or phone within 4 business days after the clinic has received the results. If you do not hear from us within 7 days, please contact the clinic through MyChart or phone. If you have a critical or abnormal lab result, we will notify you by phone as soon as possible.  Submit refill requests through [employer] or call your pharmacy and they will forward the refill request to us. Please allow 3 business days for your refill to be completed.          Additional  "Information About Your Visit        MyChart Information     AquaBlok gives you secure access to your electronic health record. If you see a primary care provider, you can also send messages to your care team and make appointments. If you have questions, please call your primary care clinic.  If you do not have a primary care provider, please call [phone] and they will assist you.        Care EveryWhere ID     This is your Care EveryWhere ID. This could be used by other organizations to access your [place] medical records  [id number]        Your Vitals Were     Pulse Temperature Height BMI (Body Mass Index)          70 98.1  F (36.7  C) (Oral) 5' 2.5\" (1.588 m) 22.86 kg/m2         Blood Pressure from Last 3 Encounters:   02/23/18 132/74   11/03/17 143/75   10/06/17 150/72    Weight from Last 3 Encounters:   02/23/18 127 lb (57.6 kg)   11/03/17 123 lb 8 oz (56 kg)   10/06/17 122 lb 8 oz (55.6 kg)              Today, you had the following     No orders found for display         Today's Medication Changes          These changes are accurate as of 2/23/18  9:38 AM.  If you have any questions, ask your nurse or doctor.               These medicines have changed or have updated prescriptions.        Dose/Directions    traZODone 50 MG tablet   Commonly known as:  DESYREL   This may have changed:  See the new instructions.   Used for:  Anxiety, Insomnia, unspecified type   Changed by:  Susan Calhoun MD        1/2- 1 tablet at bedtime as needed for sleeplessness   Quantity:  60 tablet   Refills:  2         Stop taking these medicines if you haven't already. Please contact your care team if you have questions.     donepezil 5 MG tablet   Commonly known as:  ARICEPT   Stopped by:  Susan Calhoun MD                Where to get your medicines      These medications were sent to [facility] [place] - [address]  [address] [facility] [address]     " Phone:  [phone]     metoprolol succinate 50 MG 24 hr tablet    traZODone 50 MG tablet                Primary Care Provider Office Phone # Fax #    Susan Rupali Calhoun [phone]       [facility] [address]        Equal Access to Services     NATASHA BARAKAT : Hadii aad ku hadasho Soomaali, waaxda luqadaha, qaybta kaalmada adeegyada, waxmonae hutchinsonin hayaan adebertha fishlennoxmariluz vang . So [facility] [phone].    ATENCIÓN: Si habla español, tiene a johnson disposición servicios gratuitos de asistencia lingüística. Llame al [phone].    We comply with applicable federal civil rights laws and [place] laws. We do not discriminate on the basis of race, color, national origin, age, disability, sex, sexual orientation, or gender identity.            Thank you!     Thank you for choosing [facility]  for your care. Our goal is always to provide you with excellent care. Hearing back from our patients is one way we can continue to improve our services. Please take a few minutes to complete the written survey that you may receive in the mail after your visit with us. Thank you!             Your Updated Medication List - Protect others around you: Learn how to safely use, store and throw away your medicines at www.disposemymeds.org.          This list is accurate as of 2/23/18  9:38 AM.  Always use your most recent med list.                   Brand Name Dispense Instructions for use Diagnosis    BIOTIN           CALCIUM CARBONATE           escitalopram 20 MG tablet    LEXAPRO    90 tablet    TAKE ONE TABLET BY MOUTH EVERY DAY    Anxiety       FISH OIL           FLAXSEED           GLUCOSAMINE CHOND COMPLEX/MSM Tabs           metoprolol succinate 50 MG 24 hr tablet    TOPROL-XL    90 tablet    Take 1 tablet (50 mg) by mouth daily    HTN, goal below 140/90       MULTIPLE VITAMIN PO           simvastatin 10 MG tablet    ZOCOR    90 tablet    TAKE ONE TABLET BY MOUTH AT BEDTIME     Hyperlipidemia LDL goal <130       traZODone 50 MG tablet    DESYREL    60 tablet    1/2- 1 tablet at bedtime as needed for sleeplessness    Anxiety, Insomnia, unspecified type       VITAMIN D PO           VITAMIN E

## 2018-02-23 NOTE — PATIENT INSTRUCTIONS
[facility]   Discharged by : Kimberlee Lima MA    Paper scripts provided to patient : no     If you have any questions regarding your visit please contact your care team:     Team Gold                Clinic Hours Telephone Number     Dr. Rosita Rizzo 7am-7pm  Monday - Thursday   7am-5pm  Fridays  [phone]   (Appointment scheduling available 24/7)     RN Line  [phone] option 2     Urgent Care - [place] - 11am-9pm Monday-Friday Saturday-Sunday- 9am-5pm     [place] -   5pm-9pm Monday-Friday Saturday-Sunday- 9am-5pm    [phone] - [place]    [phone] - [place]       For a Price Quote for your services, please call our Consumer Price Line at [phone].     What options do I have for visits at the clinic other than the traditional office visit?     To expand how we care for you, many of our providers are utilizing electronic visits (e-visits) and telephone visits, when medically appropriate, for interactions with their patients rather than a visit in the clinic. We also offer nurse visits for many medical concerns. Just like any other service, we will bill your insurance company for this type of visit based on time spent on the phone with your provider. Not all insurance companies cover these visits. Please check with your medical insurance if this type of visit is covered. You will be responsible for any charges that are not paid by your insurance.   E-visits via Health Benefits Direct: generally incur a $35.00 fee.     Telephone visits:   Time spent on the phone: *charged based on time that is spent on the phone in increments of 10 minutes. Estimated cost:   5-10 mins $30.00   11-20 mins. $59.00   21-30 mins. $85.00     Use Health Benefits Direct (secure email communication and access to your chart) to send your primary care provider a message or make an appointment. Ask someone on  your Team how to sign up for [employer].     As always, Thank you for trusting us with your health care needs!      [facility]:    Scheduling Appointments  [place] [facility]  Call: [phone]    Bryan Storey [facility]  Call: [phone]    [facility]  Call: [phone]    For Gastroenterology referrals   [facility] Gastroenterology   Clinics and Surgery [place], 4th Floor   [address] [place] [address]   Appointments: [phone]    WHERE TO GO FOR CARE?  Clinic    Make an appointment if you:       Are sick (cold, cough, flu, sore throat, earache or in pain).       Have a small injury (sprain, small cut, burn or broken bone).       Need a physical exam, Pap smear, vaccine or prescription refill.       Have questions about your health or medicines.    To reach us:      Call [phone] ([phone]). Open 24 hours every day. (For counseling services, call [phone].)    Log into [employer] at [employer].org. (Visit [email or web address] to create an account.) Hospital emergency room    An emergency is a serious or life- threatening problem that must be treated right away.    Call 602 or get to the hospital if you have:      Very bad or sudden:            - Chest pain or pressure         - Bleeding         - Head or belly pain         - Dizziness or trouble seeing, walking or                          Speaking      Problems breathing      Blood in your vomit or you are coughing up blood      A major injury (knocked out, loss of a finger or limb, rape, broken bone protruding from skin)    A mental health crisis. (Or call the Mental Health Crisis line at 1-238.485.1781 or Suicide Prevention Hotline at 1-810.716.2244.)    Open 24 hours every day. You don't need an appointment.     Urgent care    Visit urgent care for sickness or small injuries when the clinic is closed. You don't need an appointment. To check hours or find  an urgent care near you, visit [email or web address]. Online care    Get online care from [facility] for more than 70 common problems, like colds, allergies and infections. Open 24 hours every day at:   [email or web address]   Need help deciding?    For advice about where to be seen, you may call your clinic and ask to speak with a nurse. We're here for you 24 hours every day.         If you are deaf or hard of hearing, please let us know. We provide many free services including sign language interpreters, oral interpreters, TTYs, telephone amplifiers, note takers and written materials.

## 2018-02-23 NOTE — PROGRESS NOTES
SUBJECTIVE:   Shari Graham is a 67 year old female who presents to clinic today for the following health issues:      Hypertension Follow-up      Outpatient blood pressures are not being checked.    Low Salt Diet: no added salt    Metoprolol 50 mg daily.        Mood:  She states that her anxiety has been well controlled lately. She states that she has been feeling really good. Her appetite has increased and she has put on a healthy amount of weight. Thay have changed the way that they plan their meals, and have been using the statusboom's man. She has been sleeping well. She will usually take 25 mg of trazodone at night, and if she is having trouble sleeping she will take the other half of the pill.     Memory:  She states that she discontinued the Aricept. She states that she was having diarrhea for about 2 weeks. She was having urgent diarrhea multiple times a day. This was causing her discomfort. They did not see the neurologist. They feel that they are living the life that they want to live, and the patient states that she is happy with the life that she is living at this time. The patient states that her and her  do everything together. She will go out with friends sometimes. She is still driving, but only to yoga class which is very close to her home.  Her  is not concerned about her driving on city streets which are well known to her.       Amount of exercise or physical activity: does yoga, takes a class    Problems taking medications regularly: No    Medication side effects: none    Diet: low salt        Problem list and histories reviewed & adjusted, as indicated.  Additional history: as documented    Patient Active Problem List   Diagnosis     Hyperlipidemia LDL goal <130     HTN, goal below 140/90     Adenomatous colon polyp     Anxiety     Abnormal blood sugar     Past Surgical History:   Procedure Laterality Date     APPENDECTOMY       COLONOSCOPY       HYSTERECTOMY, PAP NO LONGER  INDICATED  1995    for reasons of fibroids     SURGICAL HISTORY OF -   grade school    neck cyst removed     SURGICAL HISTORY OF -   2011    right cyst removed on wrist       Social History   Substance Use Topics     Smoking status: Former Smoker     Packs/day: 0.50     Years: 11.00     Types: Cigarettes     Quit date: 7/27/1981     Smokeless tobacco: Never Used     Alcohol use Yes      Comment: 2/wk     Family History   Problem Relation Age of Onset     HEART DISEASE Mother      DIABETES Mother      Hypertension Mother      CANCER Father      Respiratory Father      Alzheimer Disease Paternal Grandmother      Prostate Cancer Paternal Grandfather      Prostate Cancer Brother      Breast Cancer Sister      DIABETES Maternal Grandmother          Current Outpatient Prescriptions   Medication Sig Dispense Refill     escitalopram (LEXAPRO) 20 MG tablet TAKE ONE TABLET BY MOUTH EVERY DAY 90 tablet 3     donepezil (ARICEPT) 5 MG tablet Take 1 tablet (5 mg) by mouth At Bedtime 90 tablet 1     simvastatin (ZOCOR) 10 MG tablet TAKE ONE TABLET BY MOUTH AT BEDTIME 90 tablet 3     metoprolol (TOPROL-XL) 50 MG 24 hr tablet Take 1 tablet (50 mg) by mouth daily 90 tablet 1     traZODone (DESYREL) 50 MG tablet TAKE ONE-HALF TO TWO TABLETS BY MOUTH NIGHTLY AS NEEDED FOR INSOMNIA 60 tablet 2     CALCIUM CARBONATE        Cholecalciferol (VITAMIN D PO)        VITAMIN E        FISH OIL        FLAXSEED        MULTIPLE VITAMIN PO        BIOTIN        Misc Natural Products (GLUCOSAMINE CHOND COMPLEX/MSM) TABS        No Known Allergies    Reviewed and updated as needed this visit by clinical staff  Allergies       Reviewed and updated as needed this visit by Provider  Allergies  Meds  Problems         ROS:  Constitutional, HEENT, cardiovascular, pulmonary, gi and gu systems are negative, except as otherwise noted.    This document serves as a record of the services and decisions personally performed by SHANTEL BARTHOLOMEW. It was  "created on his/her behalf by La Cates, a trained medical scribe. The creation of this document is based on the provider's statements to the medical scribe. La Cates, February 23, 2018 9:19 AM    OBJECTIVE:     /74  Pulse 70  Temp 98.1  F (36.7  C) (Oral)  Ht 1.588 m (5' 2.5\")  Wt 57.6 kg (127 lb)  BMI 22.86 kg/m2  Body mass index is 22.86 kg/(m^2).  GENERAL: healthy, alert and no distress  PSYCH: mentation appears normal, affect normal/bright, is able to converse easily about her life and lifestyle.    Diagnostic Test Results:  No results found for this or any previous visit (from the past 24 hour(s)).    ASSESSMENT/PLAN:     (I10) HTN, goal below 140/90  (primary encounter diagnosis)  Comment: Well controlled on current medications.  Plan: metoprolol succinate (TOPROL-XL) 50 MG 24 hr         tablet        Continue current medications.    (R41.3) Memory changes  Comment: Patient has discontinued Aricept and declined seeing a neurologist.   Also declines neuropsych testing as well as an MRI.  Plan: Continue to monitor.    feels they are living a stable lifestyle.  He does not seem to feel overwhelmed (as he did at one point) about assisting with Soraya's cares.  And he is not concerned about her driving to [employer] at this time.    (F41.9) Anxiety  Comment: Well controlled on current medications.  Plan: traZODone (DESYREL) 50 MG tablet        Continue current medications.    (G47.00) Insomnia, unspecified type  Comment: Well controlled on current medications.  Plan: traZODone (DESYREL) 50 MG tablet        Continue current medications.      There are no Patient Instructions on file for this visit.    Susan Calhoun MD  [facility]    The information in this document, created by the medical scribe La Cates for me, accurately reflects the services I personally performed and the decisions made by me. I have reviewed and approved this document for accuracy prior to leaving " the patient care area.

## 2018-02-23 NOTE — NURSING NOTE
"Chief Complaint   Patient presents with     Hypertension       Initial /74  Pulse 70  Temp 98.1  F (36.7  C) (Oral)  Ht 5' 2.5\" (1.588 m)  Wt 127 lb (57.6 kg)  BMI 22.86 kg/m2 Estimated body mass index is 22.86 kg/(m^2) as calculated from the following:    Height as of this encounter: 5' 2.5\" (1.588 m).    Weight as of this encounter: 127 lb (57.6 kg).  Medication Reconciliation: complete   Kimberlee Lima MA      "

## 2018-08-22 DIAGNOSIS — G47.00 INSOMNIA, UNSPECIFIED TYPE: ICD-10-CM

## 2018-08-22 DIAGNOSIS — F41.9 ANXIETY: ICD-10-CM

## 2018-08-23 RX ORDER — TRAZODONE HYDROCHLORIDE 50 MG/1
TABLET, FILM COATED ORAL
Qty: 30 TABLET | Refills: 0 | Status: SHIPPED | OUTPATIENT
Start: 2018-08-23 | End: 2018-09-21

## 2018-08-23 NOTE — TELEPHONE ENCOUNTER
"Requested Prescriptions   Pending Prescriptions Disp Refills     traZODone (DESYREL) 50 MG tablet [Pharmacy Med Name: TRAZODONE HCL 50MG TABS]  Last Written Prescription Date:  2/23/2018  Last Fill Quantity: 60 tablet,  # refills: 2   Last office visit: 2/23/2018 with prescribing provider:  VOLODYMYR Calhoun   Future Office Visit:   Next 5 appointments (look out 90 days)     Sep 26, 2018  2:40 PM CDT   PHYSICAL with Susan Calhoun MD   [facility] ([address] [id number] [phone]                  60 tablet 2     Sig: TAKE ONE-HALF TO ONE TABLET BY MOUTH EVERY NIGHT AT BEDTIME AS NEEDED FOR SLEEPLESSNESS    Serotonin Modulators Passed    8/22/2018  8:14 PM       Passed - Recent (12 mo) or future (30 days) visit within the authorizing provider's specialty    Patient had office visit in the last 12 months or has a visit in the next 30 days with authorizing provider or within the authorizing provider's specialty.  See \"Patient Info\" tab in inbasket, or \"Choose Columns\" in Meds & Orders section of the refill encounter.           Passed - Patient is age 18 or older       Passed - No active pregnancy on record       Passed - No positive pregnancy test in past 12 months          "

## 2018-09-21 DIAGNOSIS — F41.9 ANXIETY: ICD-10-CM

## 2018-09-21 DIAGNOSIS — G47.00 INSOMNIA, UNSPECIFIED TYPE: ICD-10-CM

## 2018-09-21 RX ORDER — TRAZODONE HYDROCHLORIDE 50 MG/1
TABLET, FILM COATED ORAL
Qty: 30 TABLET | Refills: 0 | Status: SHIPPED | OUTPATIENT
Start: 2018-09-21 | End: 2018-09-26

## 2018-09-21 NOTE — TELEPHONE ENCOUNTER
"Requested Prescriptions   Pending Prescriptions Disp Refills     traZODone (DESYREL) 50 MG tablet [Pharmacy Med Name: TRAZODONE HCL 50MG TABS] 30 tablet 0     Sig: TAKE ONE-HALF TO ONE TABLET BY MOUTH EVERY NIGHT AT BEDTIME AS NEEDED FOR SLEEP    Serotonin Modulators Passed    9/21/2018  9:01 AM       Passed - Recent (12 mo) or future (30 days) visit within the authorizing provider's specialty    Patient had office visit in the last 12 months or has a visit in the next 30 days with authorizing provider or within the authorizing provider's specialty.  See \"Patient Info\" tab in inbasket, or \"Choose Columns\" in Meds & Orders section of the refill encounter.           Passed - Patient is age 18 or older       Passed - No active pregnancy on record       Passed - No positive pregnancy test in past 12 months        Last Written Prescription Date:  8/23/18  Last Fill Quantity: 30,  # refills: 0   Last office visit: 2/23/2018 with prescribing provider:  [facility]   Future Office Visit:   Next 5 appointments (look out 90 days)     Sep 26, 2018  2:40 PM CDT   PHYSICAL with Susan Calhoun MD   [facility] ([facility])    [address] [facility] [address] [phone]                   "

## 2018-09-25 ASSESSMENT — ENCOUNTER SYMPTOMS
HEMATURIA: 0
FEVER: 0
SHORTNESS OF BREATH: 0
FREQUENCY: 0
CONSTIPATION: 0
ARTHRALGIAS: 0
HEADACHES: 0
BREAST MASS: 0
JOINT SWELLING: 0
MYALGIAS: 0
COUGH: 0
EYE PAIN: 0
WEAKNESS: 0
PARESTHESIAS: 0
NERVOUS/ANXIOUS: 1
DYSURIA: 0
DIARRHEA: 0
DIZZINESS: 0
SORE THROAT: 0
ABDOMINAL PAIN: 0
HEMATOCHEZIA: 0
NAUSEA: 0
CHILLS: 1
HEARTBURN: 0
PALPITATIONS: 0

## 2018-09-25 ASSESSMENT — ACTIVITIES OF DAILY LIVING (ADL)
CURRENT_FUNCTION: NO ASSISTANCE NEEDED
I_NEED_ASSISTANCE_FOR_THE_FOLLOWING_DAILY_ACTIVITIES:: NO ASSISTANCE IS NEEDED

## 2018-09-26 ENCOUNTER — OFFICE VISIT (OUTPATIENT)
Dept: FAMILY MEDICINE | Facility: CLINIC | Age: 68
End: 2018-09-26
Payer: COMMERCIAL

## 2018-09-26 VITALS
DIASTOLIC BLOOD PRESSURE: 84 MMHG | BODY MASS INDEX: 23.14 KG/M2 | WEIGHT: 130.6 LBS | SYSTOLIC BLOOD PRESSURE: 134 MMHG | HEIGHT: 63 IN | OXYGEN SATURATION: 98 % | TEMPERATURE: 98.1 F | HEART RATE: 60 BPM | RESPIRATION RATE: 16 BRPM

## 2018-09-26 DIAGNOSIS — F41.9 ANXIETY: ICD-10-CM

## 2018-09-26 DIAGNOSIS — R41.3 MEMORY CHANGES: ICD-10-CM

## 2018-09-26 DIAGNOSIS — Z00.00 ROUTINE GENERAL MEDICAL EXAMINATION AT A HEALTH CARE FACILITY: Primary | ICD-10-CM

## 2018-09-26 DIAGNOSIS — E78.5 HYPERLIPIDEMIA LDL GOAL <130: ICD-10-CM

## 2018-09-26 DIAGNOSIS — Z23 NEED FOR PROPHYLACTIC VACCINATION AND INOCULATION AGAINST INFLUENZA: ICD-10-CM

## 2018-09-26 DIAGNOSIS — M79.644 THUMB PAIN, RIGHT: ICD-10-CM

## 2018-09-26 DIAGNOSIS — R73.09 ABNORMAL BLOOD SUGAR: ICD-10-CM

## 2018-09-26 DIAGNOSIS — G47.00 INSOMNIA, UNSPECIFIED TYPE: ICD-10-CM

## 2018-09-26 DIAGNOSIS — Z12.31 VISIT FOR SCREENING MAMMOGRAM: ICD-10-CM

## 2018-09-26 DIAGNOSIS — L91.8 SKIN TAG: ICD-10-CM

## 2018-09-26 DIAGNOSIS — I10 HTN, GOAL BELOW 140/90: ICD-10-CM

## 2018-09-26 PROCEDURE — G0439 PPPS, SUBSEQ VISIT: HCPCS | Performed by: FAMILY MEDICINE

## 2018-09-26 PROCEDURE — 99213 OFFICE O/P EST LOW 20 MIN: CPT | Mod: 25 | Performed by: FAMILY MEDICINE

## 2018-09-26 RX ORDER — TRAZODONE HYDROCHLORIDE 50 MG/1
TABLET, FILM COATED ORAL
Qty: 90 TABLET | Refills: 3 | Status: SHIPPED | OUTPATIENT
Start: 2018-09-26 | End: 2019-11-10

## 2018-09-26 RX ORDER — ESCITALOPRAM OXALATE 20 MG/1
20 TABLET ORAL DAILY
Qty: 90 TABLET | Refills: 3 | Status: SHIPPED | OUTPATIENT
Start: 2018-09-26 | End: 2019-11-18

## 2018-09-26 ASSESSMENT — ENCOUNTER SYMPTOMS
HEMATURIA: 0
FEVER: 0
DIARRHEA: 0
MYALGIAS: 0
SHORTNESS OF BREATH: 0
JOINT SWELLING: 0
FREQUENCY: 0
BREAST MASS: 0
NAUSEA: 0
HEMATOCHEZIA: 0
PALPITATIONS: 0
ABDOMINAL PAIN: 0
EYE PAIN: 0
HEARTBURN: 0
HEADACHES: 0
COUGH: 0
SORE THROAT: 0
WEAKNESS: 0
CONSTIPATION: 0
NERVOUS/ANXIOUS: 1
DIZZINESS: 0
PARESTHESIAS: 0
ARTHRALGIAS: 0
DYSURIA: 0

## 2018-09-26 ASSESSMENT — PAIN SCALES - GENERAL: PAINLEVEL: NO PAIN (0)

## 2018-09-26 ASSESSMENT — ACTIVITIES OF DAILY LIVING (ADL): CURRENT_FUNCTION: NO ASSISTANCE NEEDED

## 2018-09-26 NOTE — PATIENT INSTRUCTIONS
Return for fasting lab work.     You can wear a brace on your thumb at night while you sleep to help with the pain you are having. I believe this pain is due to arthritis.    If the pain does not improve after using the brace nightly for 4-6 weeks, or if it worsens or you notice swelling, please let me know.    [facility]   Discharged by : Valeria Huber CMA  If you have any questions regarding your visit please contact your care team:     Team Gold                Clinic Hours Telephone Number     Dr. Rosita Morales, CNP  Nathalie Marr, CNP 7am-7pm  Monday - Thursday   7am-5pm  Fridays  [phone]   (Appointment scheduling available 24/7)     RN Line  [phone] option 2     Urgent Care - [place] and [facility] - 11am-9pm Monday-Friday Saturday-Sunday- 9am-5pm     [place] -   5pm-9pm Monday-Friday Saturday-Sunday- 9am-5pm    [phone] - [place]    [phone] - [place]       For a Price Quote for your services, please call our Consumer Price Line at [phone].     What options do I have for visits at the clinic other than the traditional office visit?     To expand how we care for you, many of our providers are utilizing electronic visits (e-visits) and telephone visits, when medically appropriate, for interactions with their patients rather than a visit in the clinic. We also offer nurse visits for many medical concerns. Just like any other service, we will bill your insurance company for this type of visit based on time spent on the phone with your provider. Not all insurance companies cover these visits. Please check with your medical insurance if this type of visit is covered. You will be responsible for any charges that are not paid by your insurance.   E-visits via [employer]: generally incur a $35.00 fee.     Telephone visits:  Time spent on the phone: *charged based on time that is spent  on the phone in increments of 10 minutes. Estimated cost:   5-10 mins $30.00   11-20 mins. $59.00   21-30 mins. $85.00       Use [employer] (secure email communication and access to your chart) to send your primary care provider a message or make an appointment. Ask someone on your Team how to sign up for Source[employer].     As always, Thank you for trusting us with your health care needs!      [facility]:    Scheduling Appointments  [facility]  Call: [phone]    Bryan Storey [facility]  Call: [phone]    [facility]  Call: [phone]    For Gastroenterology referrals   [facility]   Clinics and Surgery Center, 4th Floor   [address] [place] [address]   Appointments: [phone]    WHERE TO GO FOR CARE?  Clinic    Make an appointment if you:       Are sick (cold, cough, flu, sore throat, earache or in pain).       Have a small injury (sprain, small cut, burn or broken bone).       Need a physical exam, Pap smear, vaccine or prescription refill.       Have questions about your health or medicines.    To reach us:      Call [phone] ([phone]). Open 24 hours every day. (For counseling services, call [phone].)    Log into [employer] at [email or web address]. (Visit [email or web address] to create an account.) Hospital emergency room    An emergency is a serious or life- threatening problem that must be treated right away.    Call 348 or get to the hospital if you have:      Very bad or sudden:            - Chest pain or pressure         - Bleeding         - Head or belly pain         - Dizziness or trouble seeing, walking or                          Speaking      Problems breathing      Blood in your vomit or you are coughing up blood      A major injury (knocked out, loss of a finger or limb, rape, broken bone protruding from skin)    A mental health crisis. (Or call the Mental Health Crisis line  at 1-100.242.3532 or Suicide Prevention Hotline at 1-738.747.5769.)    Open 24 hours every day. You don't need an appointment.     Urgent care    Visit urgent care for sickness or small injuries when the clinic is closed. You don't need an appointment. To check hours or find an urgent care near you, visit [email or web address]. Online care    Get online [facility] from [facility] for more than 70 common problems, like colds, allergies and infections. Open 24 hours every day at:   [email or web address]   Need help deciding?    For advice about where to be seen, you may call your clinic and ask to speak with a nurse. We're here for you 24 hours every day.         If you are deaf or hard of hearing, please let us know. We provide many free services including sign language interpreters, oral interpreters, TTYs, telephone amplifiers, note takers and written materials.

## 2018-09-26 NOTE — PROGRESS NOTES
"SUBJECTIVE:   Shari Graham is a 68 year old female who presents for Preventive Visit.    Accompanied by her  who assists with history  Are you in the first 12 months of your Medicare coverage?  No    Physical   Annual:     Getting at least 3 servings of Calcium per day:  Yes    Bi-annual eye exam:  Yes    Dental care twice a year:  Yes    Sleep apnea or symptoms of sleep apnea:  Daytime drowsiness    Diet:  Regular (no restrictions)    Frequency of exercise:  2-3 days/week    Duration of exercise:  45-60 minutes    Taking medications regularly:  Yes    Medication side effects:  None    Additional concerns today:  YES    Ability to successfully perform activities of daily living: no assistance needed    Home Safety:  Throw rugs in the hallway and lack of grab bars in the bathroom    Hearing Impairment: difficulty following a conversation in a noisy restaurant or crowded room        Fall risk:  Fallen 2 or more times in the past year?: No  Any fall with injury in the past year?: No    COGNITIVE SCREEN  1) Repeat 3 items (Leader, Season, Table)    2) Clock draw: unable   3) 3 item recall: does not remember 3 words   Results: ABNORMAL clock, 1-2 items recalled: PROBABLE COGNITIVE IMPAIRMENT, **INFORM PROVIDER**    Mini-CogTM Copyright RAULITO King. Licensed by the author for use in [facility]; reprinted with permission (socruz@[facility]). All rights reserved.        Additional Comments:  Patient reports that she notices her vision is \"Fuzzy\" at times. She has an appointment to have this checked.     She reports that her hearing issues are stable, she continues to wear her hearing aids and these work well for her.     She notes that her mood has been good. She continues on Lexapro and Trazodone at night. She is unsure if she needs the trazodone at night, but she continues to take this nightly. She is comfortable continuing this. She states that most of her anxiety is gone.     Patient continues to " drive to her yoga class and grocery shopping. She is comfortable driving these short distances.     Patient does yoga once weekly, and also walks with her  often.     She mentions some pain in her left thumb since a fall. She feels there is a bump where it is tender. Her  notes that several months ago she fell I the garden, and this pain has been intermittent since.       Reviewed and updated as needed this visit by clinical staff  Allergies  Meds  Problems         Reviewed and updated as needed this visit by Provider  Allergies  Meds  Problems        Social History   Substance Use Topics     Smoking status: Former Smoker     Packs/day: 0.50     Years: 11.00     Types: Cigarettes     Quit date: 7/27/1981     Smokeless tobacco: Never Used     Alcohol use Yes      Comment: 2/wk       Alcohol Use 9/25/2018   If you drink alcohol do you typically have greater than 3 drinks per day OR greater than 7 drinks per week? No   No flowsheet data found.          Today's PHQ-2 Score:   PHQ-2 ( 1999 [employer]) 9/25/2018   Q1: Little interest or pleasure in doing things 0   Q2: Feeling down, depressed or hopeless 0   PHQ-2 Score 0   Q1: Little interest or pleasure in doing things Not at all   Q2: Feeling down, depressed or hopeless Not at all   PHQ-2 Score 0       Do you feel safe in your environment - YES    Do you have a Health Care Directive?: Yes: Patient states has Advance Directive and will bring in a copy to clinic.    Current providers sharing in care for this patient include:   Patient Care Team:  Susan Calhoun MD as PCP - General (Family Practice)    The following health maintenance items are reviewed in Epic and correct as of today:  Health Maintenance   Topic Date Due     ADVANCE DIRECTIVE PLANNING Q5 YRS  08/13/2017     INFLUENZA VACCINE (1) 09/01/2018     BMP Q1 YR  10/06/2018     FALL RISK ASSESSMENT  10/06/2018     LIPID MONITORING Q1 YEAR  10/06/2018     PHQ-2 Q1 YR  10/06/2018      MAMMO Q1 YR  10/16/2018     COLONOSCOPY Q5 YR  09/22/2019     TETANUS IMMUNIZATION (SYSTEM ASSIGNED)  11/14/2022     DEXA SCAN SCREENING (SYSTEM ASSIGNED)  Addressed     PNEUMOCOCCAL  Completed     HEPATITIS C SCREENING  Completed     Patient Active Problem List   Diagnosis     Hyperlipidemia LDL goal <130     HTN, goal below 140/90     Adenomatous colon polyp     Anxiety     Abnormal blood sugar     Memory changes     Past Surgical History:   Procedure Laterality Date     APPENDECTOMY       COLONOSCOPY       HYSTERECTOMY, PAP NO LONGER INDICATED  1995    for reasons of fibroids     SURGICAL HISTORY OF -   grade school    neck cyst removed     SURGICAL HISTORY OF -   2011    right cyst removed on wrist       Social History   Substance Use Topics     Smoking status: Former Smoker     Packs/day: 0.50     Years: 11.00     Types: Cigarettes     Quit date: 7/27/1981     Smokeless tobacco: Never Used     Alcohol use Yes      Comment: 2/wk     Family History   Problem Relation Age of Onset     HEART DISEASE Mother      Diabetes Mother      Hypertension Mother      Cancer Father      Respiratory Father      Alzheimer Disease Paternal Grandmother      Prostate Cancer Paternal Grandfather      Prostate Cancer Brother      Breast Cancer Sister      Diabetes Maternal Grandmother          Current Outpatient Prescriptions   Medication Sig Dispense Refill     BIOTIN        CALCIUM CARBONATE        Cholecalciferol (VITAMIN D PO)        escitalopram (LEXAPRO) 20 MG tablet Take 1 tablet (20 mg) by mouth daily 90 tablet 3     FISH OIL        FLAXSEED        metoprolol succinate (TOPROL-XL) 50 MG 24 hr tablet Take 1 tablet (50 mg) by mouth daily 90 tablet 1     Misc Natural Products (GLUCOSAMINE CHOND COMPLEX/MSM) TABS        MULTIPLE VITAMIN PO        order for DME Equipment being ordered: right wrist brace with thumb support 1 Device 0     simvastatin (ZOCOR) 10 MG tablet TAKE ONE TABLET BY MOUTH AT BEDTIME 90 tablet 3     traZODone  "(DESYREL) 50 MG tablet TAKE ONE-HALF TO ONE TABLET BY MOUTH EVERY NIGHT AT BEDTIME AS NEEDED FOR SLEEP 90 tablet 3     VITAMIN E        [DISCONTINUED] escitalopram (LEXAPRO) 20 MG tablet TAKE ONE TABLET BY MOUTH EVERY DAY 90 tablet 3     [DISCONTINUED] traZODone (DESYREL) 50 MG tablet TAKE ONE-HALF TO ONE TABLET BY MOUTH EVERY NIGHT AT BEDTIME AS NEEDED FOR SLEEP 30 tablet 0     No Known Allergies    Mammogram Screening: Patient over age 50, mutual decision to screen reflected in health maintenance.    Review of Systems   Constitutional: Negative for fever.   HENT: Positive for hearing loss. Negative for congestion, ear pain and sore throat.    Eyes: Positive for visual disturbance. Negative for pain.   Respiratory: Negative for cough and shortness of breath.    Cardiovascular: Negative for chest pain, palpitations and peripheral edema.   Gastrointestinal: Negative for abdominal pain, constipation, diarrhea, heartburn, hematochezia and nausea.   Breasts:  Negative for tenderness, breast mass and discharge.   Genitourinary: Negative for dysuria, frequency, genital sores, hematuria, pelvic pain, urgency, vaginal bleeding and vaginal discharge.   Musculoskeletal: Negative for arthralgias, joint swelling and myalgias.   Skin: Negative for rash.   Neurological: Negative for dizziness, weakness, headaches and paresthesias.   Psychiatric/Behavioral: Negative for mood changes. The patient is nervous/anxious.      The information in this document, created by the medical scribmati Cates for me, accurately reflects the services I personally performed and the decisions made by me. I have reviewed and approved this document for accuracy prior to leaving the patient care area.    OBJECTIVE:   /84 (BP Location: Right arm, Patient Position: Chair, Cuff Size: Adult Regular)  Pulse 60  Temp 98.1  F (36.7  C) (Oral)  Resp 16  Ht 1.588 m (5' 2.5\")  Wt 59.2 kg (130 lb 9.6 oz)  SpO2 98%  BMI 23.51 kg/m2 Estimated body mass " "index is 23.51 kg/(m^2) as calculated from the following:    Height as of this encounter: 1.588 m (5' 2.5\").    Weight as of this encounter: 59.2 kg (130 lb 9.6 oz).  Physical Exam  GENERAL APPEARANCE: healthy, alert and no distress  EYES: Eyes grossly normal to inspection, PERRL and conjunctivae and sclerae normal  HENT: ear canals and TM's normal, nose and mouth without ulcers or lesions, oropharynx clear and oral mucous membranes moist  NECK: no adenopathy, no asymmetry, masses, or scars and thyroid normal to palpation  RESP: lungs clear to auscultation - no rales, rhonchi or wheezes  BREAST: normal without masses, tenderness or nipple discharge and no palpable axillary masses or adenopathy  CV: regular rate and rhythm, normal S1 S2, no S3 or S4, no murmur, click or rub, no peripheral edema and peripheral pulses strong  ABDOMEN: soft, nontender, no hepatosplenomegaly, no masses and bowel sounds normal  MS: Normal range of motion of right thumb, no tenderness in snuff box, slight tenderness at the base of the thumb. No edema. gait is age appropriate without ataxia  SKIN: small keratoses  And skin tags on neck. no suspicious lesions or rashes  NEURO: Normal strength and tone, sensory exam grossly normal, mentation intact and speech normal  PSYCH: mentation appears normal and affect normal/bright    Diagnostic Test Results:  No results found for this or any previous visit (from the past 24 hour(s)).    ASSESSMENT / PLAN:   (Z00.00) Routine general medical examination at a health care facility  (primary encounter diagnosis)  Comment:   Plan: All other health maintenance updated per chart.    (Z12.31) Visit for screening mammogram  Comment: Health maintenance.  Plan: MA SCREENING DIGITAL BILAT - Future  (s+30)            (Z23) Need for prophylactic vaccination and inoculation against influenza  Comment: Health maintenance.  Plan:     (I10) HTN, goal below 140/90  Comment: Well controlled on current medications.  Plan: " BASIC METABOLIC PANEL        Continue current medications.    (R41.3) Memory changes  Comment: Stable per her 's report - and as noted at multiple visits, they decline further evaluation at this time.  Plan: Patient continues to drive short distances. Continue to monitor.    (F41.9) Anxiety  Comment: Well controlled on current medications.  Plan: escitalopram (LEXAPRO) 20 MG tablet, traZODone         (DESYREL) 50 MG tablet        Continue current medications.    (G47.00) Insomnia, unspecified type  Comment: Well controlled on current medications.  Plan: traZODone (DESYREL) 50 MG tablet        Continue current medications.    (M79.644) Thumb pain, right  Comment: patient complains of right thumb pain.  Plan: order for DME        I believe her pain is due to osteoarthritis. I provided the patient with a brace to wear at night.  Discussed warning signs/symptoms for which she needs followup.      (L91.8) Skin tag  Comment: Patient has small skin tags on her neck she would like removed.   Plan: Patient will see one of the PA's on my team to have these removed.    (E78.5) Hyperlipidemia LDL goal <130  Comment: Well controlled on current medications.  Plan: Lipid panel reflex to direct LDL Fasting        Patient will return for fasting labs. Continue current medications. Adjust treatment based on labs.    (R73.09) Abnormal blood sugar  Comment: Patient has a history of elevated fasting blood glucose.   Plan: JUST IN CASE        Adjust treatment based on labs. Continue to monitor.      End of Life Planning:  Patient currently has an advanced directive: No - patient's  reports that they have a living will and have plans to complete the advanced care directive.     COUNSELING:  Reviewed preventive health counseling, as reflected in patient instructions    BP Readings from Last 1 Encounters:   09/26/18 134/84     Estimated body mass index is 23.51 kg/(m^2) as calculated from the following:    Height as of this  "encounter: 1.588 m (5' 2.5\").    Weight as of this encounter: 59.2 kg (130 lb 9.6 oz).           reports that she quit smoking about 37 years ago. Her smoking use included Cigarettes. She has a 5.50 pack-year smoking history. She has never used smokeless tobacco.      Appropriate preventive services were discussed with this patient, including applicable screening as appropriate for cardiovascular disease, diabetes, osteopenia/osteoporosis, and glaucoma.  As appropriate for age/gender, discussed screening for colorectal cancer, prostate cancer, breast cancer, and cervical cancer. Checklist reviewing preventive services available has been given to the patient.    Reviewed patients plan of care and provided an AVS. The Basic Care Plan (routine screening as documented in Health Maintenance) for Shari meets the Care Plan requirement. This Care Plan has been established and reviewed with the spouse.    Counseling Resources:  ATP IV Guidelines  Pooled Cohorts Equation Calculator  Breast Cancer Risk Calculator  FRAX Risk Assessment  ICSI Preventive Guidelines  Dietary Guidelines for Americans, 2010  USDA's MyPlate  ASA Prophylaxis  Lung CA Screening    Susan Calhoun MD  [facility]  Answers for HPI/ROS submitted by the patient on 9/25/2018   PHQ-2 Score: 0    The information in this document, created by the medical scribe La Cates for me, accurately reflects the services I personally performed and the decisions made by me. I have reviewed and approved this document for accuracy prior to leaving the patient care area.  "

## 2018-09-26 NOTE — MR AVS SNAPSHOT
After Visit Summary   9/26/2018    Shari Graham    MRN: [id number]           Patient Information     Date Of Birth          1950        Visit Information        Provider Department      9/26/2018 2:40 PM Susan Calhoun MD [facility]        Today's Diagnoses     Routine general medical examination at a health care facility    -  1    Visit for screening mammogram        Need for prophylactic vaccination and inoculation against influenza        HTN, goal below 140/90        Memory changes        Anxiety        Insomnia, unspecified type        Thumb pain, right        Skin tag        Hyperlipidemia LDL goal <130        Abnormal blood sugar          Care Instructions    Return for fasting lab work.     You can wear a brace on your thumb at night while you sleep to help with the pain you are having. I believe this pain is due to arthritis.    If the pain does not improve after using the brace nightly for 4-6 weeks, or if it worsens or you notice swelling, please let me know.    [facility]   Discharged by : Valeria Huber CMA  If you have any questions regarding your visit please contact your care team:     Team Gold                Clinic Hours Telephone Number     Dr. Rosita Morales, BARTOLOME Marr, CNP 7am-7pm  Monday - Thursday   7am-5pm  Fridays  [phone]   (Appointment scheduling available 24/7)     RN Line  [phone] option 2     Urgent Care - [place] and [facility] - 11am-9pm Monday-Friday Saturday-Sunday- 9am-5pm     [place] -   5pm-9pm Monday-Friday Saturday-Sunday- 9am-5pm    [phone] - [place]    [phone] - [place]       For a Price Quote for your services, please call our Consumer Price Line at [phone].     What options do I have for visits at the clinic other than the traditional office visit?     To expand how we  care for you, many of our providers are utilizing electronic visits (e-visits) and telephone visits, when medically appropriate, for interactions with their patients rather than a visit in the clinic. We also offer nurse visits for many medical concerns. Just like any other service, we will bill your insurance company for this type of visit based on time spent on the phone with your provider. Not all insurance companies cover these visits. Please check with your medical insurance if this type of visit is covered. You will be responsible for any charges that are not paid by your insurance.   E-visits via Own Productshart: generally incur a $35.00 fee.     Telephone visits:  Time spent on the phone: *charged based on time that is spent on the phone in increments of 10 minutes. Estimated cost:   5-10 mins $30.00   11-20 mins. $59.00   21-30 mins. $85.00       Use Stima Systemst (secure email communication and access to your chart) to send your primary care provider a message or make an appointment. Ask someone on your Team how to sign up for [employer].     As always, Thank you for trusting us with your health care needs!      [facility]:    Scheduling Appointments  [facility]  Call: [phone]    [facility]  Call: [phone]    [facility]  Call: [phone]    For Gastroenterology referrals   [facility] Gastroenterology   Clinics and Surgery Center, 4th Floor   [address] [place] [address]   Appointments: [phone]    WHERE TO GO FOR CARE?  Clinic    Make an appointment if you:       Are sick (cold, cough, flu, sore throat, earache or in pain).       Have a small injury (sprain, small cut, burn or broken bone).       Need a physical exam, Pap smear, vaccine or prescription refill.       Have questions about your health or medicines.    To reach us:      Call [phone] ([phone]). Open 24 hours every day. (For  counseling services, call [phone].)    Log into BackType at [employer]. (Visit [facility] to create an account.) Hospital emergency room    An emergency is a serious or life- threatening problem that must be treated right away.    Call 911 or get to the hospital if you have:      Very bad or sudden:            - Chest pain or pressure         - Bleeding         - Head or belly pain         - Dizziness or trouble seeing, walking or                          Speaking      Problems breathing      Blood in your vomit or you are coughing up blood      A major injury (knocked out, loss of a finger or limb, rape, broken bone protruding from skin)    A mental health crisis. (Or call the Mental Health Crisis line at 1-964.849.2957 or Suicide Prevention Hotline at 1-976.139.7760.)    Open 24 hours every day. You don't need an appointment.     Urgent care    Visit urgent care for sickness or small injuries when the clinic is closed. You don't need an appointment. To check hours or find an urgent care near you, visit [email or web address]. Online care    Get online care from [employer] for more than 70 common problems, like colds, allergies and infections. Open 24 hours every day at:   [email or web address]   Need help deciding?    For advice about where to be seen, you may call your clinic and ask to speak with a nurse. We're here for you 24 hours every day.         If you are deaf or hard of hearing, please let us know. We provide many free services including sign language interpreters, oral interpreters, TTYs, telephone amplifiers, note takers and written materials.                   Follow-ups after your visit        Follow-up notes from your care team     Return in about 6 months (around 3/26/2019) for Hypertension/Blood pressure.      Future tests that were ordered for you today     Open Future Orders        Priority Expected Expires Ordered    MA SCREENING DIGITAL BILAT - Future  (s+30) Routine  9/26/2019  "9/26/2018            Who to contact     If you have questions or need follow up information about today's clinic visit or your schedule please contact [facility] directly at [phone].  Normal or non-critical lab and imaging results will be communicated to you by MyChart, letter or phone within 4 business days after the clinic has received the results. If you do not hear from us within 7 days, please contact the clinic through MyChart or phone. If you have a critical or abnormal lab result, we will notify you by phone as soon as possible.  Submit refill requests through wripl or call your pharmacy and they will forward the refill request to us. Please allow 3 business days for your refill to be completed.          Additional Information About Your Visit        wripl Information     wripl gives you secure access to your electronic health record. If you see a primary care provider, you can also send messages to your care team and make appointments. If you have questions, please call your primary care clinic.  If you do not have a primary care provider, please call [phone] and they will assist you.        Care EveryWhere ID     This is your Care EveryWhere ID. This could be used by other organizations to access your [place] medical records  [id number]        Your Vitals Were     Pulse Temperature Respirations Height Pulse Oximetry BMI (Body Mass Index)    60 98.1  F (36.7  C) (Oral) 16 5' 2.5\" (1.588 m) 98% 23.51 kg/m2       Blood Pressure from Last 3 Encounters:   09/26/18 134/84   02/23/18 132/74   11/03/17 143/75    Weight from Last 3 Encounters:   09/26/18 130 lb 9.6 oz (59.2 kg)   02/23/18 127 lb (57.6 kg)   11/03/17 123 lb 8 oz (56 kg)              We Performed the Following     BASIC METABOLIC PANEL     JUST IN CASE     Lipid panel reflex to direct LDL Fasting          Today's Medication Changes          These changes are accurate as of 9/26/18  3:28 PM.  If you have any " questions, ask your nurse or doctor.               Start taking these medicines.        Dose/Directions    order for DME   Used for:  Thumb pain, right        Equipment being ordered: right wrist brace with thumb support   Quantity:  1 Device   Refills:  0         These medicines have changed or have updated prescriptions.        Dose/Directions    escitalopram 20 MG tablet   Commonly known as:  LEXAPRO   This may have changed:  See the new instructions.   Used for:  Anxiety        Dose:  20 mg   Take 1 tablet (20 mg) by mouth daily   Quantity:  90 tablet   Refills:  3            Where to get your medicines      These medications were sent to [facility] [address] [place] [address].  [address] [place] [address]., [place] [address]     Phone:  [phone]     escitalopram 20 MG tablet    traZODone 50 MG tablet         Some of these will need a paper prescription and others can be bought over the counter.  Ask your nurse if you have questions.     Bring a paper prescription for each of these medications     order for DME                Primary Care Provider Office Phone # Fax #    Susan Rupali Calhoun -[phone]       [address] [place] [address]        Equal Access to Services     [facility] Hospital[facility] : Hadii justus anand Sorosalind, waaxda luqadaha, qaybta kaalmada cindi, pascale mckeon. So [facility] [phone].    ATENCIÓN: Si habla español, tiene a johnson disposición servicios gratuitos de asistencia lingüística. Delmis al [phone].    We comply with applicable federal civil rights laws and [place] laws. We do not discriminate on the basis of race, color, national origin, age, disability, sex, sexual orientation, or gender identity.            Thank you!     Thank you for choosing [facility]  for your care. Our goal is always to provide you with excellent care. Hearing back from our patients is one way we can  continue to improve our services. Please take a few minutes to complete the written survey that you may receive in the mail after your visit with us. Thank you!             Your Updated Medication List - Protect others around you: Learn how to safely use, store and throw away your medicines at www.disposemymeds.org.          This list is accurate as of 9/26/18  3:28 PM.  Always use your most recent med list.                   Brand Name Dispense Instructions for use Diagnosis    BIOTIN           CALCIUM CARBONATE           escitalopram 20 MG tablet    LEXAPRO    90 tablet    Take 1 tablet (20 mg) by mouth daily    Anxiety       FISH OIL           FLAXSEED           GLUCOSAMINE CHOND COMPLEX/MSM Tabs           metoprolol succinate 50 MG 24 hr tablet    TOPROL-XL    90 tablet    Take 1 tablet (50 mg) by mouth daily    HTN, goal below 140/90       MULTIPLE VITAMIN PO           order for DME     1 Device    Equipment being ordered: right wrist brace with thumb support    Thumb pain, right       simvastatin 10 MG tablet    ZOCOR    90 tablet    TAKE ONE TABLET BY MOUTH AT BEDTIME    Hyperlipidemia LDL goal <130       traZODone 50 MG tablet    DESYREL    90 tablet    TAKE ONE-HALF TO ONE TABLET BY MOUTH EVERY NIGHT AT BEDTIME AS NEEDED FOR SLEEP    Anxiety, Insomnia, unspecified type       VITAMIN D PO           VITAMIN E

## 2018-10-04 DIAGNOSIS — L91.8 SKIN TAG: ICD-10-CM

## 2018-10-04 DIAGNOSIS — I10 HTN, GOAL BELOW 140/90: ICD-10-CM

## 2018-10-04 DIAGNOSIS — R73.09 ABNORMAL BLOOD SUGAR: ICD-10-CM

## 2018-10-04 LAB
ANION GAP SERPL CALCULATED.3IONS-SCNC: 8 MMOL/L (ref 3–14)
BUN SERPL-MCNC: 13 MG/DL (ref 7–30)
CALCIUM SERPL-MCNC: 8.8 MG/DL (ref 8.5–10.1)
CHLORIDE SERPL-SCNC: 105 MMOL/L (ref 94–109)
CHOLEST SERPL-MCNC: 206 MG/DL
CO2 SERPL-SCNC: 27 MMOL/L (ref 20–32)
CREAT SERPL-MCNC: 0.83 MG/DL (ref 0.52–1.04)
GFR SERPL CREATININE-BSD FRML MDRD: 68 ML/MIN/1.7M2
GLUCOSE SERPL-MCNC: 102 MG/DL (ref 70–99)
HDLC SERPL-MCNC: 80 MG/DL
LDLC SERPL CALC-MCNC: 104 MG/DL
NONHDLC SERPL-MCNC: 126 MG/DL
POTASSIUM SERPL-SCNC: 4.1 MMOL/L (ref 3.4–5.3)
SODIUM SERPL-SCNC: 140 MMOL/L (ref 133–144)
TRIGL SERPL-MCNC: 110 MG/DL

## 2018-10-04 PROCEDURE — 80048 BASIC METABOLIC PNL TOTAL CA: CPT | Performed by: FAMILY MEDICINE

## 2018-10-04 PROCEDURE — 80061 LIPID PANEL: CPT | Performed by: FAMILY MEDICINE

## 2018-10-04 PROCEDURE — 83036 HEMOGLOBIN GLYCOSYLATED A1C: CPT | Performed by: FAMILY MEDICINE

## 2018-10-04 PROCEDURE — 36415 COLL VENOUS BLD VENIPUNCTURE: CPT | Performed by: FAMILY MEDICINE

## 2018-10-05 LAB — HBA1C MFR BLD: 5.5 % (ref 0–5.6)

## 2018-10-08 ENCOUNTER — OFFICE VISIT (OUTPATIENT)
Dept: FAMILY MEDICINE | Facility: CLINIC | Age: 68
End: 2018-10-08
Payer: COMMERCIAL

## 2018-10-08 VITALS
SYSTOLIC BLOOD PRESSURE: 130 MMHG | HEART RATE: 58 BPM | BODY MASS INDEX: 23.5 KG/M2 | WEIGHT: 132.6 LBS | DIASTOLIC BLOOD PRESSURE: 68 MMHG | HEIGHT: 63 IN | OXYGEN SATURATION: 99 % | TEMPERATURE: 98.1 F

## 2018-10-08 DIAGNOSIS — L91.8 SKIN TAG: Primary | ICD-10-CM

## 2018-10-08 DIAGNOSIS — L82.1 SEBORRHEIC KERATOSIS: ICD-10-CM

## 2018-10-08 DIAGNOSIS — Z23 NEED FOR PROPHYLACTIC VACCINATION AND INOCULATION AGAINST INFLUENZA: ICD-10-CM

## 2018-10-08 PROCEDURE — 17110 DESTRUCTION B9 LES UP TO 14: CPT | Performed by: FAMILY MEDICINE

## 2018-10-08 PROCEDURE — 11200 RMVL SKIN TAGS UP TO&INC 15: CPT | Mod: 59 | Performed by: FAMILY MEDICINE

## 2018-10-08 PROCEDURE — 99207 ZZC DROP WITH A PROCEDURE: CPT | Performed by: FAMILY MEDICINE

## 2018-10-08 PROCEDURE — G0008 ADMIN INFLUENZA VIRUS VAC: HCPCS | Performed by: FAMILY MEDICINE

## 2018-10-08 PROCEDURE — 90662 IIV NO PRSV INCREASED AG IM: CPT | Performed by: FAMILY MEDICINE

## 2018-10-08 NOTE — PATIENT INSTRUCTIONS
[facility]   Discharged by : tiffany  Paper scripts provided to patient : none     If you have any questions regarding your visit please contact your care team:     Team Gold                Clinic Hours Telephone Number     Dr. Rosita Morales, CNP  Nathalie Marr, CNP 7am-7pm  Monday - Thursday   7am-5pm  Fridays  [phone]   (Appointment scheduling available 24/7)     RN Line  [phone] option 2     Urgent Care - [place] and [place] - 11am-9pm Monday-Friday Saturday-Sunday- 9am-5pm     [place] -   5pm-9pm Monday-Friday Saturday-Sunday- 9am-5pm    [phone] - [place]    [phone] - [place]       For a Price Quote for your services, please call our Consumer Price Line at [phone].     What options do I have for visits at the clinic other than the traditional office visit?     To expand how we care for you, many of our providers are utilizing electronic visits (e-visits) and telephone visits, when medically appropriate, for interactions with their patients rather than a visit in the clinic. We also offer nurse visits for many medical concerns. Just like any other service, we will bill your insurance company for this type of visit based on time spent on the phone with your provider. Not all insurance companies cover these visits. Please check with your medical insurance if this type of visit is covered. You will be responsible for any charges that are not paid by your insurance.   E-visits via Metabiota: generally incur a $35.00 fee.     Telephone visits:  Time spent on the phone: *charged based on time that is spent on the phone in increments of 10 minutes. Estimated cost:   5-10 mins $30.00   11-20 mins. $59.00   21-30 mins. $85.00       Use Metabiota (secure email communication and access to your chart) to send your primary care provider a message or make an appointment. Ask someone on  your Team how to sign up for [employer].     As always, Thank you for trusting us with your health care needs!      [facility]:    Scheduling Appointments  [place]  Call: [phone]    Bryan Storey [facility]  Call: [phone]    [facility]  Call: [phone]    For Gastroenterology referrals   [facility] Gastroenterology   Clinics and Surgery [place], 4th Floor   [address] [place] [address]   Appointments: [phone]    WHERE TO GO FOR CARE?  Clinic    Make an appointment if you:       Are sick (cold, cough, flu, sore throat, earache or in pain).       Have a small injury (sprain, small cut, burn or broken bone).       Need a physical exam, Pap smear, vaccine or prescription refill.       Have questions about your health or medicines.    To reach us:      Call [phone] ([phone]). Open 24 hours every day. (For counseling services, call [phone].)    Log into [facility] at [email or web address]. (Visit [email or web address] to create an account.) Hospital emergency room    An emergency is a serious or life- threatening problem that must be treated right away.    Call 228 or get to the hospital if you have:      Very bad or sudden:            - Chest pain or pressure         - Bleeding         - Head or belly pain         - Dizziness or trouble seeing, walking or                          Speaking      Problems breathing      Blood in your vomit or you are coughing up blood      A major injury (knocked out, loss of a finger or limb, rape, broken bone protruding from skin)    A mental health crisis. (Or call the Mental Health Crisis line at 1-508.793.4793 or Suicide Prevention Hotline at 1-573.801.3382.)    Open 24 hours every day. You don't need an appointment.     Urgent care    Visit urgent care for sickness or small injuries when the clinic is closed. You don't need an appointment. To check hours or find  an urgent care near you, visit [email or web address]. Online care    Get online care from [facility] for more than 70 common problems, like colds, allergies and infections. Open 24 hours every day at:   [email or web address]   Need help deciding?    For advice about where to be seen, you may call your clinic and ask to speak with a nurse. We're here for you 24 hours every day.         If you are deaf or hard of hearing, please let us know. We provide many free services including sign language interpreters, oral interpreters, TTYs, telephone amplifiers, note takers and written materials.

## 2018-10-08 NOTE — MR AVS SNAPSHOT
After Visit Summary   10/8/2018    Shari Graham    MRN: [id number]           Patient Information     Date Of Birth          1950        Visit Information        Provider Department      10/8/2018 11:40 AM Zac Centeno MD [facility]        Today's Diagnoses     Skin tag    -  1    Seborrheic keratosis        Need for prophylactic vaccination and inoculation against influenza          Care Instructions    [facility]   Discharged by : tiffany  Paper scripts provided to patient : none     If you have any questions regarding your visit please contact your care team:     Team Gold                Clinic Hours Telephone Number     Dr. Rosita Morales, CNP  Nathalie Marr, CNP 7am-7pm  Monday - Thursday   7am-5pm  Fridays  [phone]   (Appointment scheduling available 24/7)     RN Line  [phone] option 2     [facility] [place] [facility] 11am-9pm Monday-Friday Saturday-Sunday- 9am-5pm     [place] -   5pm-9pm Monday-Friday Saturday-Sunday- 9am-5pm    [phone] - [place]    [phone] - [place]       For a Price Quote for your services, please call our Consumer Price Line at [phone].     What options do I have for visits at the clinic other than the traditional office visit?     To expand how we care for you, many of our providers are utilizing electronic visits (e-visits) and telephone visits, when medically appropriate, for interactions with their patients rather than a visit in the clinic. We also offer nurse visits for many medical concerns. Just like any other service, we will bill your insurance company for this type of visit based on time spent on the phone with your provider. Not all insurance companies cover these visits. Please check with your medical insurance if this type of visit is covered. You will be responsible for  any charges that are not paid by your insurance.   E-visits via Counsylhart: generally incur a $35.00 fee.     Telephone visits:  Time spent on the phone: *charged based on time that is spent on the phone in increments of 10 minutes. Estimated cost:   5-10 mins $30.00   11-20 mins. $59.00   21-30 mins. $85.00       Use Counsylhart (secure email communication and access to your chart) to send your primary care provider a message or make an appointment. Ask someone on your Team how to sign up for Curalatet.     As always, Thank you for trusting us with your health care needs!      [facility]:    Scheduling Appointments  Laina Clark [facility]  Call: [phone]    [facility]  Call: [phone]    [facility]  Call: [phone]    For Gastroenterology referrals   [facility]   Clinics and Surgery Center, 4th Floor   [address] [place] [address]   Appointments: [phone]    WHERE TO GO FOR CARE?  Clinic    Make an appointment if you:       Are sick (cold, cough, flu, sore throat, earache or in pain).       Have a small injury (sprain, small cut, burn or broken bone).       Need a physical exam, Pap smear, vaccine or prescription refill.       Have questions about your health or medicines.    To reach us:      Call [phone] ([phone]). Open 24 hours every day. (For counseling services, call [phone].)    Log into [employer] at [email or web address]. (Visit [email or web address] to create an account.) Hospital emergency room    An emergency is a serious or life- threatening problem that must be treated right away.    Call 082 or get to the hospital if you have:      Very bad or sudden:            - Chest pain or pressure         - Bleeding         - Head or belly pain         - Dizziness or trouble seeing, walking or                          Speaking      Problems breathing      Blood in your vomit or you  are coughing up blood      A major injury (knocked out, loss of a finger or limb, rape, broken bone protruding from skin)    A mental health crisis. (Or call the Mental Health Crisis line at 1-220.849.6623 or Suicide Prevention Hotline at 1-636.204.4125.)    Open 24 hours every day. You don't need an appointment.     Urgent care    Visit urgent care for sickness or small injuries when the clinic is closed. You don't need an appointment. To check hours or find an urgent care near you, visit [email or web address]. Online care    Get online care from [employer] for more than 70 common problems, like colds, allergies and infections. Open 24 hours every day at:   [email or web address]   Need help deciding?    For advice about where to be seen, you may call your clinic and ask to speak with a nurse. We're here for you 24 hours every day.         If you are deaf or hard of hearing, please let us know. We provide many free services including sign language interpreters, oral interpreters, TTYs, telephone amplifiers, note takers and written materials.                   Follow-ups after your visit        Your next 10 appointments already scheduled     Oct 17, 2018  2:15 PM CDT   MA SCREENING DIGITAL BILATERAL with [id number]   [facility]    [address] [place] [id number] [phone]           How do I prepare for my exam? (Food and drink instructions) No Food and Drink Restrictions.  How do I prepare for my exam? (Other instructions) Do not use any powder, lotion or deodorant under your arms or on your breast. If you do, we will ask you to remove it before your exam.  What should I wear: Wear comfortable, two-piece clothing.  How long does the exam take: Most scans will take 15 minutes.  What should I bring: Bring any previous mammograms from other facilities or have them mailed to the breast center.  Do I need a :  No  is needed.  What do I need to tell my doctor: If you  "have any allergies, tell your care team.  What should I do after the exam: No restrictions, You may resume normal activities.  What is this test: This test is an x-ray of the breast to look for breast disease. The breast is pressed between two plates to flatten and spread the tissue. An X-ray is taken of the breast from different angles.  Who should I call with questions: If you have any questions, please call the Imaging Department where you will have your exam. Directions, parking instructions, and other information is available on our website, [email or web address].  Other information about my exam Three-dimensional (3D) mammograms are available at [facility] locations in [facility], [place], [place], [place], [place], [facility] and [place]. [facility] locations include [place] and the [facility] in [place].  Benefits of 3D mammograms include * Improved rate of cancer detection * Decreases your chance of having to go back for more tests, which means fewer: * \"False-positive\" results (This means that there is an abnormal area but it isn't cancer.) * Invasive testing procedures, such as a biopsy or surgery * Can provide clearer images of the breast if you have dense breast tissue.  *3D mammography is an optional exam that anyone can have with a 2D mammogram. It doesn't replace or take the place of a 2D mammogram. 2D mammograms remain an effective screening test for all women.  Not all insurance companies cover the cost of a 3D mammogram. Check with your insurance.              Who to contact     If you have questions or need follow up information about today's clinic visit or your schedule please contact [facility] directly at [phone].  Normal or non-critical lab and imaging results will be communicated to you by MyChart, letter or phone within 4 business days after the clinic has received the results. If you do not hear from us within 7 days, please " "contact the clinic through Stumpwise or phone. If you have a critical or abnormal lab result, we will notify you by phone as soon as possible.  Submit refill requests through Stumpwise or call your pharmacy and they will forward the refill request to us. Please allow 3 business days for your refill to be completed.          Additional Information About Your Visit        GlycoPurehart Information     Stumpwise gives you secure access to your electronic health record. If you see a primary care provider, you can also send messages to your care team and make appointments. If you have questions, please call your primary care clinic.  If you do not have a primary care provider, please call [phone] and they will assist you.        Care EveryWhere ID     This is your Care EveryWhere ID. This could be used by other organizations to access your [facility] medical records  [id number]        Your Vitals Were     Pulse Temperature Height Pulse Oximetry BMI (Body Mass Index)       58 98.1  F (36.7  C) (Oral) 5' 2.5\" (1.588 m) 99% 23.87 kg/m2        Blood Pressure from Last 3 Encounters:   10/08/18 130/68   09/26/18 134/84   02/23/18 132/74    Weight from Last 3 Encounters:   10/08/18 132 lb 9.6 oz (60.1 kg)   09/26/18 130 lb 9.6 oz (59.2 kg)   02/23/18 127 lb (57.6 kg)              We Performed the Following     ADMIN INFLUENZA (For MEDICARE Patients ONLY) []     DESTRUCT BENIGN LESION, UP TO 14     FLU VACCINE, INCREASED ANTIGEN, PRESV FREE, AGE 65+ [57737]     REMOVAL OF SKIN TAGS, FIRST 15        Primary Care Provider Office Phone # Fax #    Susan Rupali Calhoun [phone]       [address] [facility] [address]        Equal Access to Services     NATASHA BARAKAT : Dylan Roldan, donnell smith, pascale price. So [facility] [phone].    ATENCIÓN: Si habla español, tiene a johnson disposición servicios gratuitos de asistencia " lingüística. Delmis al [phone].    We comply with applicable federal civil rights laws and [place] laws. We do not discriminate on the basis of race, color, national origin, age, disability, sex, sexual orientation, or gender identity.            Thank you!     Thank you for choosing [facility]  for your care. Our goal is always to provide you with excellent care. Hearing back from our patients is one way we can continue to improve our services. Please take a few minutes to complete the written survey that you may receive in the mail after your visit with us. Thank you!             Your Updated Medication List - Protect others around you: Learn how to safely use, store and throw away your medicines at www.disposemymeds.org.          This list is accurate as of 10/8/18 12:07 PM.  Always use your most recent med list.                   Brand Name Dispense Instructions for use Diagnosis    BIOTIN           CALCIUM CARBONATE           escitalopram 20 MG tablet    LEXAPRO    90 tablet    Take 1 tablet (20 mg) by mouth daily    Anxiety       FISH OIL           FLAXSEED           GLUCOSAMINE CHOND COMPLEX/MSM Tabs           metoprolol succinate 50 MG 24 hr tablet    TOPROL-XL    90 tablet    Take 1 tablet (50 mg) by mouth daily    HTN, goal below 140/90       MULTIPLE VITAMIN PO           order for DME     1 Device    Equipment being ordered: right wrist brace with thumb support    Thumb pain, right       simvastatin 10 MG tablet    ZOCOR    90 tablet    TAKE ONE TABLET BY MOUTH AT BEDTIME    Hyperlipidemia LDL goal <130       traZODone 50 MG tablet    DESYREL    90 tablet    TAKE ONE-HALF TO ONE TABLET BY MOUTH EVERY NIGHT AT BEDTIME AS NEEDED FOR SLEEP    Anxiety, Insomnia, unspecified type       VITAMIN D PO           VITAMIN E

## 2018-10-08 NOTE — PROGRESS NOTES
SUBJECTIVE:   Shari Graham is a 68 year old female who presents to clinic today for the following health issues:      Skin tags- around neck- gets caught on things. Would like to have them removed.     She has some growths on both sides of the neck that are getting caught on her jewelry.  Otherwise feels well at this time.          Problem list and histories reviewed & adjusted, as indicated.  Additional history: as documented    Patient Active Problem List   Diagnosis     Hyperlipidemia LDL goal <130     HTN, goal below 140/90     Adenomatous colon polyp     Anxiety     Abnormal blood sugar     Memory changes     Past Surgical History:   Procedure Laterality Date     APPENDECTOMY       COLONOSCOPY       HYSTERECTOMY, PAP NO LONGER INDICATED      for reasons of fibroids     SURGICAL HISTORY OF -   grade school    neck cyst removed     SURGICAL HISTORY OF -       right cyst removed on wrist       Social History   Substance Use Topics     Smoking status: Former Smoker     Packs/day: 0.50     Years: 11.00     Types: Cigarettes     Quit date: 1981     Smokeless tobacco: Never Used     Alcohol use Yes      Comment: 2/wk     Family History   Problem Relation Age of Onset     HEART DISEASE Mother      Diabetes Mother           Hypertension Mother      Cancer Father      Respiratory Father      Prostate Cancer Father           Alzheimer Disease Paternal Grandmother      Prostate Cancer Paternal Grandfather      Prostate Cancer Brother      Breast Cancer Sister      Diabetes Maternal Grandmother           Breast Cancer Sister      Prostate Cancer Brother          Current Outpatient Prescriptions   Medication Sig Dispense Refill     BIOTIN        CALCIUM CARBONATE        Cholecalciferol (VITAMIN D PO)        escitalopram (LEXAPRO) 20 MG tablet Take 1 tablet (20 mg) by mouth daily 90 tablet 3     FISH OIL        FLAXSEED        metoprolol succinate (TOPROL-XL) 50 MG 24 hr tablet  "Take 1 tablet (50 mg) by mouth daily 90 tablet 1     Misc Natural Products (GLUCOSAMINE CHOND COMPLEX/MSM) TABS        MULTIPLE VITAMIN PO        order for DME Equipment being ordered: right wrist brace with thumb support 1 Device 0     simvastatin (ZOCOR) 10 MG tablet TAKE ONE TABLET BY MOUTH AT BEDTIME 90 tablet 3     traZODone (DESYREL) 50 MG tablet TAKE ONE-HALF TO ONE TABLET BY MOUTH EVERY NIGHT AT BEDTIME AS NEEDED FOR SLEEP 90 tablet 3     VITAMIN E        No Known Allergies    Reviewed and updated as needed this visit by clinical staff  Tobacco  Allergies  Meds  Problems  Med Hx  Surg Hx  Fam Hx  Soc Hx        Reviewed and updated as needed this visit by Provider  Tobacco  Meds  Problems  Med Hx  Surg Hx  Fam Hx  Soc Hx        ROS:  Constitutional, HEENT, cardiovascular, pulmonary, gi and gu systems are negative, except as otherwise noted.  CONSTITUTIONAL: NEGATIVE for fever, chills, change in weight  ENT/MOUTH: NEGATIVE for ear, mouth and throat problems  RESP: NEGATIVE for significant cough or SOB    OBJECTIVE:     /68 (BP Location: Right arm, Patient Position: Sitting, Cuff Size: Adult Regular)  Pulse 58  Temp 98.1  F (36.7  C) (Oral)  Ht 5' 2.5\" (1.588 m)  Wt 132 lb 9.6 oz (60.1 kg)  SpO2 99%  BMI 23.87 kg/m2  Body mass index is 23.87 kg/(m^2).  GENERAL: healthy, alert and no distress  EYES: Eyes grossly normal to inspection, PERRL and conjunctivae and sclerae normal  SKIN: no suspicious lesions or rashes  SKIN: she has scattered small benign appearing skin tags around the neck and also 3 pigmented lesions consistent with seb keratoses as these are flat.  NEURO: Normal strength and tone, mentation intact and speech normal  PSYCH: mentation appears normal, affect normal/bright    Diagnostic Test Results:  none     Procedure note:  After verbal consent was given we agreed to proceed.  After cleaning the skin with alcohol, approximately 8 skin tags were removed suing pick ups and " iris scissors.  One bandaid was placed.  For the 3 seb k, LN x 3 was applied.  No complications were noted.    ASSESSMENT/PLAN:             1. Skin tag  Removed today.  Aftercares were discussed.  No samples for pathology.  - REMOVAL OF SKIN TAGS, FIRST 15    2. Seborrheic keratosis  LN x 3 applied, aftercares discussed.    - DESTRUCT BENIGN LESION, UP TO 14    3. Need for prophylactic vaccination and inoculation against influenza  Flu shot today.  - FLU VACCINE, INCREASED ANTIGEN, PRESV FREE, AGE 65+ [62402]  - ADMIN INFLUENZA (For MEDICARE Patients ONLY) []        Zac Centeno MD  [facility]      Injectable Influenza Immunization Documentation    1.  Is the person to be vaccinated sick today?   No    2. Does the person to be vaccinated have an allergy to a component   of the vaccine?   No  Egg Allergy Algorithm Link    3. Has the person to be vaccinated ever had a serious reaction   to influenza vaccine in the past?   No    4. Has the person to be vaccinated ever had Guillain-Barré syndrome?   No    Form completed by patient

## 2018-10-17 ENCOUNTER — RADIANT APPOINTMENT (OUTPATIENT)
Dept: MAMMOGRAPHY | Facility: CLINIC | Age: 68
End: 2018-10-17
Payer: COMMERCIAL

## 2018-10-17 DIAGNOSIS — Z12.31 VISIT FOR SCREENING MAMMOGRAM: ICD-10-CM

## 2018-10-17 PROCEDURE — 77067 SCR MAMMO BI INCL CAD: CPT | Mod: TC

## 2018-10-26 DIAGNOSIS — I10 HTN, GOAL BELOW 140/90: ICD-10-CM

## 2018-10-26 NOTE — TELEPHONE ENCOUNTER
"Requested Prescriptions   Pending Prescriptions Disp Refills     metoprolol succinate (TOPROL-XL) 50 MG 24 hr tablet [Pharmacy Med Name: METOPROLOL SUCCINATE ER 50MG TB24]  Last Written Prescription Date:  2/23/2018  Last Fill Quantity: 90 tablet,  # refills: 1   Last office visit: 9/26/2018 with prescribing provider:  VOLODYMYR Calhoun   Future Office Visit:     90 tablet 1     Sig: TAKE ONE TABLET BY MOUTH EVERY DAY    Beta-Blockers Protocol Passed    10/26/2018  1:36 PM       Passed - Blood pressure under 140/90 in past 12 months    BP Readings from Last 3 Encounters:   10/08/18 130/68   09/26/18 134/84   02/23/18 132/74          Passed - Patient is age 6 or older       Passed - Recent (12 mo) or future (30 days) visit within the authorizing provider's specialty    Patient had office visit in the last 12 months or has a visit in the next 30 days with authorizing provider or within the authorizing provider's specialty.  See \"Patient Info\" tab in inbasket, or \"Choose Columns\" in Meds & Orders section of the refill encounter.                "

## 2018-10-29 RX ORDER — METOPROLOL SUCCINATE 50 MG/1
TABLET, EXTENDED RELEASE ORAL
Qty: 90 TABLET | Refills: 1 | Status: SHIPPED | OUTPATIENT
Start: 2018-10-29 | End: 2019-12-06

## 2018-10-29 NOTE — TELEPHONE ENCOUNTER
Prescription approved per [facility] Refill Protocol.  Svetlana Tracy,Clinic Rn  [place]

## 2018-11-25 DIAGNOSIS — E78.5 HYPERLIPIDEMIA LDL GOAL <130: ICD-10-CM

## 2018-11-26 NOTE — TELEPHONE ENCOUNTER
"Requested Prescriptions   Pending Prescriptions Disp Refills     simvastatin (ZOCOR) 10 MG tablet [Pharmacy Med Name: SIMVASTATIN 10MG TABS]  Last Written Prescription Date:  10/17/2017  Last Fill Quantity: 90 tablet,  # refills: 3   Last office visit: 9/26/2018 with prescribing provider:  VOLODYMYR Calhoun   Future Office Visit:     90 tablet 3     Sig: TAKE ONE TABLET BY MOUTH AT BEDTIME    Statins Protocol Passed    11/25/2018 10:09 AM       Passed - LDL on file in past 12 months    Recent Labs   Lab Test  10/04/18   0736   LDL  104*            Passed - No abnormal creatine kinase in past 12 months    No lab results found.            Passed - Recent (12 mo) or future (30 days) visit within the authorizing provider's specialty    Patient had office visit in the last 12 months or has a visit in the next 30 days with authorizing provider or within the authorizing provider's specialty.  See \"Patient Info\" tab in inbasket, or \"Choose Columns\" in Meds & Orders section of the refill encounter.             Passed - Patient is age 18 or older       Passed - No active pregnancy on record       Passed - No positive pregnancy test in past 12 months          "

## 2018-11-27 RX ORDER — SIMVASTATIN 10 MG
TABLET ORAL
Qty: 90 TABLET | Refills: 3 | Status: SHIPPED | OUTPATIENT
Start: 2018-11-27 | End: 2019-12-06

## 2018-11-27 NOTE — TELEPHONE ENCOUNTER
Prescription approved per [facility] Refill Protocol.    Cheri Fuller RN

## 2019-09-28 ENCOUNTER — HEALTH MAINTENANCE LETTER (OUTPATIENT)
Age: 69
End: 2019-09-28

## 2019-10-08 ENCOUNTER — ALLIED HEALTH/NURSE VISIT (OUTPATIENT)
Dept: NURSING | Facility: CLINIC | Age: 69
End: 2019-10-08
Payer: COMMERCIAL

## 2019-10-08 DIAGNOSIS — Z23 NEED FOR PROPHYLACTIC VACCINATION AND INOCULATION AGAINST INFLUENZA: Primary | ICD-10-CM

## 2019-10-08 DIAGNOSIS — Z23 NEED FOR SHINGLES VACCINE: ICD-10-CM

## 2019-10-08 PROCEDURE — 99207 ZZC NO CHARGE NURSE ONLY: CPT

## 2019-10-08 PROCEDURE — 90471 IMMUNIZATION ADMIN: CPT

## 2019-10-08 PROCEDURE — 90662 IIV NO PRSV INCREASED AG IM: CPT

## 2019-10-08 PROCEDURE — 90750 HZV VACC RECOMBINANT IM: CPT

## 2019-10-08 PROCEDURE — G0008 ADMIN INFLUENZA VIRUS VAC: HCPCS | Mod: 59

## 2019-10-08 NOTE — NURSING NOTE
1.  Has the patient received the information for the Zostavax vaccine? YES    2.  Does the patient have any of the following contraindications?     Allergy to Neomycin or Gelatin? No       Current moderate or severe illness? No  Temp = 98.5  If temp > 99.0 degrees orally or patient has above allergies, vaccine is deferred    3.  The vaccine has been administered in the usual fashion and the patient was instructed to wait 15 minutes before leaving the building in the event of an allergic reaction: YES    Vaccination given by Melody Astudillo, Certified Medical Assistant (AAMA) .  Recorded by Melody Astudillo CMA

## 2019-10-22 ENCOUNTER — ANCILLARY PROCEDURE (OUTPATIENT)
Dept: MAMMOGRAPHY | Facility: CLINIC | Age: 69
End: 2019-10-22
Attending: FAMILY MEDICINE
Payer: COMMERCIAL

## 2019-10-22 DIAGNOSIS — Z12.31 VISIT FOR SCREENING MAMMOGRAM: ICD-10-CM

## 2019-10-22 PROCEDURE — 77067 SCR MAMMO BI INCL CAD: CPT | Mod: TC

## 2019-10-27 ENCOUNTER — HEALTH MAINTENANCE LETTER (OUTPATIENT)
Age: 69
End: 2019-10-27

## 2019-11-05 ENCOUNTER — TELEPHONE (OUTPATIENT)
Dept: FAMILY MEDICINE | Facility: CLINIC | Age: 69
End: 2019-11-05

## 2019-11-05 NOTE — TELEPHONE ENCOUNTER
Reason for Call:  Other     Detailed comments: Chaitanya the patients daughter would like to talk about getting a medication to help calm the patient down in the evenings, she said that the patient gets more agitated in the evenings     Phone Number Patient can be reached at: Other phone number:  [phone]    Best Time: any    Can we leave a detailed message on this number? YES    Call taken on 11/5/2019 at 1:37 PM by Jennifer Maya

## 2019-11-05 NOTE — TELEPHONE ENCOUNTER
Patient/family was instructed to return call to [facility], directly on the RN Call back line at [phone].    Chani Maza RN

## 2019-11-10 DIAGNOSIS — G47.00 INSOMNIA, UNSPECIFIED TYPE: ICD-10-CM

## 2019-11-10 DIAGNOSIS — F41.9 ANXIETY: ICD-10-CM

## 2019-11-11 RX ORDER — TRAZODONE HYDROCHLORIDE 50 MG/1
TABLET, FILM COATED ORAL
Qty: 90 TABLET | Refills: 0 | Status: SHIPPED | OUTPATIENT
Start: 2019-11-11 | End: 2019-12-06

## 2019-11-11 NOTE — TELEPHONE ENCOUNTER
"Requested Prescriptions   Pending Prescriptions Disp Refills     traZODone (DESYREL) 50 MG tablet [Pharmacy Med Name: TRAZODONE HCL 50MG TABS]  Last Written Prescription Date:  9/26/2018  Last Fill Quantity: 90 tablet,  # refills: 3   Last office visit: 9/26/2018 with prescribing provider:  VOLODYMYR Calhoun   Future Office Visit:   Next 5 appointments (look out 90 days)    Dec 06, 2019 10:00 AM CST  PHYSICAL with Susan Calhoun MD  [facility] ([address] [id number] [phone]        90 tablet 3     Sig: TAKE ONE-HALF TO ONE TABLET BY MOUTH EVERY NIGHT AT BEDTIME AS NEEDED FOR SLEEP       Serotonin Modulators Passed - 11/10/2019 10:53 AM        Passed - Recent (12 mo) or future (30 days) visit within the authorizing provider's specialty     Patient has had an office visit with the authorizing provider or a provider within the authorizing providers department within the previous 12 mos or has a future within next 30 days. See \"Patient Info\" tab in inbasket, or \"Choose Columns\" in Meds & Orders section of the refill encounter.              Passed - Medication is active on med list        Passed - Patient is age 18 or older        Passed - No active pregnancy on record        Passed - No positive pregnancy test in past 12 months        "

## 2019-11-11 NOTE — TELEPHONE ENCOUNTER
Prescription approved per [facility] Refill Protocol.    La Sinclair, RN, BSN, PHN  [facility] [place]

## 2019-11-11 NOTE — TELEPHONE ENCOUNTER
No return call, and patient scheduled for physical 12/6/19, so closing encounter at this time.     Cheri Fuller RN

## 2019-11-18 DIAGNOSIS — F41.9 ANXIETY: ICD-10-CM

## 2019-11-19 RX ORDER — ESCITALOPRAM OXALATE 20 MG/1
TABLET ORAL
Qty: 90 TABLET | Refills: 0 | Status: SHIPPED | OUTPATIENT
Start: 2019-11-19 | End: 2019-12-06

## 2019-11-19 NOTE — TELEPHONE ENCOUNTER
Prescription approved per [facility] Refill Protocol.    La Sinclair, RN, BSN, PHN  [facility] [place]

## 2019-11-19 NOTE — TELEPHONE ENCOUNTER
"Requested Prescriptions   Pending Prescriptions Disp Refills     escitalopram (LEXAPRO) 20 MG tablet [Pharmacy Med Name: ESCITALOPRAM OXALATE 20MG TABS]  Last Written Prescription Date:  9/26/2018  Last Fill Quantity: 90 tabs,  # refills: 3   Last office visit: 10/8/2018 with prescribing provider:  Lj   Future Office Visit:   Next 5 appointments (look out 90 days)    Dec 06, 2019 10:00 AM CST  PHYSICAL with Susan [facility]oun MD  [facility] [address] [place] [id number]  [phone]        90 tablet 3     Sig: TAKE ONE TABLET BY MOUTH EVERY DAY       SSRIs Protocol Passed - 11/18/2019  7:54 PM   No flowsheet data found.  AURELIO-7 SCORE 6/21/2016 7/19/2016 2/10/2017   Total Score - - -   Total Score 7 0 1            Passed - Recent (12 mo) or future (30 days) visit within the authorizing provider's specialty     Patient has had an office visit with the authorizing provider or a provider within the authorizing providers department within the previous 12 mos or has a future within next 30 days. See \"Patient Info\" tab in inbasket, or \"Choose Columns\" in Meds & Orders section of the refill encounter.              Passed - Medication is active on med list        Passed - Patient is age 18 or older        Passed - No active pregnancy on record        Passed - No positive pregnancy test in last 12 months         "

## 2019-12-06 ENCOUNTER — OFFICE VISIT (OUTPATIENT)
Dept: FAMILY MEDICINE | Facility: CLINIC | Age: 69
End: 2019-12-06
Payer: COMMERCIAL

## 2019-12-06 ENCOUNTER — TELEPHONE (OUTPATIENT)
Dept: FAMILY MEDICINE | Facility: CLINIC | Age: 69
End: 2019-12-06

## 2019-12-06 VITALS
TEMPERATURE: 98 F | SYSTOLIC BLOOD PRESSURE: 130 MMHG | HEIGHT: 63 IN | WEIGHT: 141 LBS | OXYGEN SATURATION: 99 % | DIASTOLIC BLOOD PRESSURE: 74 MMHG | HEART RATE: 66 BPM | BODY MASS INDEX: 24.98 KG/M2

## 2019-12-06 DIAGNOSIS — Z00.00 ROUTINE GENERAL MEDICAL EXAMINATION AT A HEALTH CARE FACILITY: Primary | ICD-10-CM

## 2019-12-06 DIAGNOSIS — R41.3 MEMORY CHANGES: ICD-10-CM

## 2019-12-06 DIAGNOSIS — G47.00 INSOMNIA, UNSPECIFIED TYPE: ICD-10-CM

## 2019-12-06 DIAGNOSIS — E78.5 HYPERLIPIDEMIA LDL GOAL <130: ICD-10-CM

## 2019-12-06 DIAGNOSIS — R68.2 DRY MOUTH: ICD-10-CM

## 2019-12-06 DIAGNOSIS — F41.9 ANXIETY: ICD-10-CM

## 2019-12-06 DIAGNOSIS — I10 HTN, GOAL BELOW 140/90: ICD-10-CM

## 2019-12-06 DIAGNOSIS — K14.6 TONGUE PAIN: ICD-10-CM

## 2019-12-06 LAB
ANION GAP SERPL CALCULATED.3IONS-SCNC: 3 MMOL/L (ref 3–14)
BUN SERPL-MCNC: 17 MG/DL (ref 7–30)
CALCIUM SERPL-MCNC: 9.2 MG/DL (ref 8.5–10.1)
CHLORIDE SERPL-SCNC: 106 MMOL/L (ref 94–109)
CHOLEST SERPL-MCNC: 241 MG/DL
CO2 SERPL-SCNC: 29 MMOL/L (ref 20–32)
CREAT SERPL-MCNC: 0.73 MG/DL (ref 0.52–1.04)
ERYTHROCYTE [DISTWIDTH] IN BLOOD BY AUTOMATED COUNT: 13.4 % (ref 10–15)
GFR SERPL CREATININE-BSD FRML MDRD: 84 ML/MIN/{1.73_M2}
GLUCOSE SERPL-MCNC: 102 MG/DL (ref 70–99)
HCT VFR BLD AUTO: 43.3 % (ref 35–47)
HDLC SERPL-MCNC: 76 MG/DL
HGB BLD-MCNC: 14.3 G/DL (ref 11.7–15.7)
LDLC SERPL CALC-MCNC: 143 MG/DL
MCH RBC QN AUTO: 30.6 PG (ref 26.5–33)
MCHC RBC AUTO-ENTMCNC: 33 G/DL (ref 31.5–36.5)
MCV RBC AUTO: 93 FL (ref 78–100)
NONHDLC SERPL-MCNC: 165 MG/DL
PLATELET # BLD AUTO: 239 10E9/L (ref 150–450)
POTASSIUM SERPL-SCNC: 4.1 MMOL/L (ref 3.4–5.3)
RBC # BLD AUTO: 4.68 10E12/L (ref 3.8–5.2)
SODIUM SERPL-SCNC: 138 MMOL/L (ref 133–144)
TRIGL SERPL-MCNC: 111 MG/DL
TSH SERPL DL<=0.005 MIU/L-ACNC: 2.79 MU/L (ref 0.4–4)
VIT B12 SERPL-MCNC: 263 PG/ML (ref 193–986)
WBC # BLD AUTO: 6.2 10E9/L (ref 4–11)

## 2019-12-06 PROCEDURE — 36415 COLL VENOUS BLD VENIPUNCTURE: CPT | Performed by: FAMILY MEDICINE

## 2019-12-06 PROCEDURE — 80048 BASIC METABOLIC PNL TOTAL CA: CPT | Performed by: FAMILY MEDICINE

## 2019-12-06 PROCEDURE — 99397 PER PM REEVAL EST PAT 65+ YR: CPT | Performed by: FAMILY MEDICINE

## 2019-12-06 PROCEDURE — 84443 ASSAY THYROID STIM HORMONE: CPT | Performed by: FAMILY MEDICINE

## 2019-12-06 PROCEDURE — 99214 OFFICE O/P EST MOD 30 MIN: CPT | Mod: 25 | Performed by: FAMILY MEDICINE

## 2019-12-06 PROCEDURE — 85027 COMPLETE CBC AUTOMATED: CPT | Performed by: FAMILY MEDICINE

## 2019-12-06 PROCEDURE — 82607 VITAMIN B-12: CPT | Performed by: FAMILY MEDICINE

## 2019-12-06 PROCEDURE — 80061 LIPID PANEL: CPT | Performed by: FAMILY MEDICINE

## 2019-12-06 RX ORDER — SIMVASTATIN 10 MG
TABLET ORAL
Qty: 90 TABLET | Refills: 3 | Status: SHIPPED | OUTPATIENT
Start: 2019-12-06 | End: 2022-05-18

## 2019-12-06 RX ORDER — ESCITALOPRAM OXALATE 20 MG/1
20 TABLET ORAL DAILY
Qty: 90 TABLET | Refills: 1 | Status: SHIPPED | OUTPATIENT
Start: 2019-12-06 | End: 2020-08-14

## 2019-12-06 RX ORDER — METOPROLOL SUCCINATE 50 MG/1
50 TABLET, EXTENDED RELEASE ORAL DAILY
Qty: 90 TABLET | Refills: 3 | Status: SHIPPED | OUTPATIENT
Start: 2019-12-06 | End: 2021-01-04

## 2019-12-06 RX ORDER — TRAZODONE HYDROCHLORIDE 50 MG/1
TABLET, FILM COATED ORAL
Qty: 90 TABLET | Refills: 0 | Status: SHIPPED | OUTPATIENT
Start: 2019-12-06 | End: 2019-12-10

## 2019-12-06 ASSESSMENT — ENCOUNTER SYMPTOMS
COUGH: 0
ABDOMINAL PAIN: 0
HEMATOCHEZIA: 0
HEARTBURN: 0
DIARRHEA: 0
NERVOUS/ANXIOUS: 0
FREQUENCY: 0
EYE PAIN: 0
HEADACHES: 1
CHILLS: 0
CONSTIPATION: 0
ARTHRALGIAS: 0
DIZZINESS: 0
FEVER: 0
HEMATURIA: 0

## 2019-12-06 ASSESSMENT — ACTIVITIES OF DAILY LIVING (ADL)
CURRENT_FUNCTION: SHOPPING REQUIRES ASSISTANCE
CURRENT_FUNCTION: HOUSEWORK REQUIRES ASSISTANCE
CURRENT_FUNCTION: TELEPHONE REQUIRES ASSISTANCE
CURRENT_FUNCTION: LAUNDRY REQUIRES ASSISTANCE
CURRENT_FUNCTION: TRANSPORTATION REQUIRES ASSISTANCE
CURRENT_FUNCTION: MONEY MANAGEMENT REQUIRES ASSISTANCE
CURRENT_FUNCTION: MEDICATION ADMINISTRATION REQUIRES ASSISTANCE
CURRENT_FUNCTION: PREPARING MEALS REQUIRES ASSISTANCE

## 2019-12-06 ASSESSMENT — MIFFLIN-ST. JEOR: SCORE: 1120.76

## 2019-12-06 NOTE — PATIENT INSTRUCTIONS
I recommend you schedule an appointment with ENT to evaluate your tongue symptoms. You can try drinking milk to see if the calcium helps ease the discomfort.     Drink more water. Keep it readily available on the counter as an additional reminder to drink it.     I recommend you schedule an appointment with neurology. And I will be in touch later today or early next week about a couple of recommended Neurologists.    Schedule an eye appointment.     Patient Education     [facility]   Discharged by : Kimberlee Lima MA    If you have any questions regarding your visit please contact your care team:     Team Gold                Clinic Hours Telephone Number     Dr. Rosita Marr, CNP 7am-7pm  Monday - Thursday   7am-5pm  Fridays  [phone]   (Appointment scheduling available 24/7)     RN Line  [phone] option 2     Urgent Care - [place] [facility] - 11am-9pm Monday-Friday Saturday-Sunday- 9am-5pm     [place] -   5pm-9pm Monday-Friday Saturday-Sunday- 9am-5pm    [phone] - [place]    [phone] - [place]     For a Price Quote for your services, please call our Consumer Price Line at [phone].     What options do I have for visits at the clinic other than the traditional office visit?     To expand how we care for you, many of our providers are utilizing electronic visits (e-visits) and telephone visits, when medically appropriate, for interactions with their patients rather than a visit in the clinic. We also offer nurse visits for many medical concerns. Just like any other service, we will bill your insurance company for this type of visit based on time spent on the phone with your provider. Not all insurance companies cover these visits. Please check with your medical insurance if this type of visit is covered. You will be responsible for any charges that are not paid by your insurance.   E-visits via [employer] [place]:  generally incur a $45.00 fee.     Telephone visits:  Time spent on the phone: *charged based on time that is spent on the phone in increments of 10 minutes. Estimated cost:   5-10 mins $30.00   11-20 mins. $59.00   21-30 mins. $85.00     Use [employer] (secure email communication and access to your chart) to send your primary care provider a message or make an appointment. Ask someone on your Team how to sign up for [employer].     As always, Thank you for trusting us with your health care needs!    [facility]:    Scheduling Appointments  [facility]  Call: [phone]    [facility]  Call: [phone]    [facility]  Call: [phone]    For Gastroenterology referrals   [facility]   Clinics and Surgery Center, 4th Floor   [address] [place] [address]   Appointments: [phone]    WHERE TO GO FOR CARE?  Clinic    Make an appointment if you:       Are sick (cold, cough, flu, sore throat, earache or in pain).       Have a small injury (sprain, small cut, burn or broken bone).       Need a physical exam, Pap smear, vaccine or prescription refill.       Have questions about your health or medicines.    To reach us:      Call [phone] ([phone]). Open 24 hours every day. (For counseling services, call [phone].)    Log into "[employer] at [email or web address]. (Visit [email or web address] to create an account.) Hospital emergency room    An emergency is a serious or life- threatening problem that must be treated right away.    Call 026 or get to the hospital if you have:      Very bad or sudden:            - Chest pain or pressure         - Bleeding         - Head or belly pain         - Dizziness or trouble seeing, walking or                          Speaking      Problems breathing      Blood in your vomit or you are coughing up blood      A major injury (knocked out, loss of a finger or  limb, rape, broken bone protruding from skin)    A mental health crisis. (Or call the Mental Health Crisis line at 1-470.439.9208 or Suicide Prevention Hotline at 1-309.579.5444.)    Open 24 hours every day. You don't need an appointment.     Urgent care    Visit urgent care for sickness or small injuries when the clinic is closed. You don't need an appointment. To check hours or find an urgent care near you, visit [email or web address]. Online care    Get online care from [facility] for more than 70 common problems, like colds, allergies and infections. Open 24 hours every day at:   [email or web address]   Need help deciding?    For advice about where to be seen, you may call your clinic and ask to speak with a nurse. We're here for you 24 hours every day.         If you are deaf or hard of hearing, please let us know. We provide many free services including sign language interpreters, oral interpreters, TTYs, telephone amplifiers, note takers and written materials.

## 2019-12-06 NOTE — PROGRESS NOTES
"SUBJECTIVE:   Shari Graham is a 69 year old female who presents for Preventive Visit with her daughter and  who help provide history.  Are you in the first 12 months of your Medicare coverage?  No    Healthy Habits:     In general, how would you rate your overall health?  Good    Frequency of exercise:  None    Do you usually eat at least 4 servings of fruit and vegetables a day, include whole grains    & fiber and avoid regularly eating high fat or \"junk\" foods?  Yes    Taking medications regularly:  Yes    Medication side effects:  Other    Ability to successfully perform activities of daily living:  Telephone requires assistance, transportation requires assistance, shopping requires assistance, preparing meals requires assistance, housework requires assistance, laundry requires assistance, medication administration requires assistance and money management requires assistance    Home Safety:  Lack of grab bars in the bathroom    Hearing Impairment:  Need to ask people to speak up or repeat themselves    In the past 6 months, have you been bothered by leaking of urine?  No    In general, how would you rate your overall mental or emotional health?  Fair      PHQ-2 Total Score: 2    Additional concerns today:  Yes (burning in mouth, only thing that helps is chewing gum, unsure if its a medication side effect, been ongoing for \"a long time\")    Tongue:   -Pt states that for the last few months she has trouble with her tongue constantly burning and feeling hot; daughter states this has become a quality of life issue. The pain is not affected by eating or drinking anything, chewing gym is the only thing that relieves the pain. Denies dysphagia, pain to touch the tongue. No recent mediation changes.   -Pt notes that she doesn't drink much water and has been working on increasing intake.   - notes that occasionally when taking medication she has a hard time swallowing her pills. But denies difficulty " "of swallowing food or liquids     Eyes:  -Goes to eye doctor annually, upcoming appointment at the end of January. Pt has had dry eyes in the past and used eye drops, but this isn't a consistent concern for her.     Headaches:   - states that he hears pt complain of headaches almost daily in the late morning/early afternoon. Daughter questions if this is related to dehydration. Does not prevent her from doing activities.  -Typical diet: mid-morning of muffin/yogurt/fruit, light lunch, and dinner of sandwiches/pizza and out to dinner once per week, goes to children's home for dinner often.  notes that occasionally she says she isn't hungry or doesn't want to eat, but she will typically eat all the same meals she does. Denies GI or bowel issues, bladder concerns.     Anxiety:  - notes that Soraya feels anxiety with social situations and not wanting to leave home. However, when she gets to the location she does well socializing and doesn't seem anxious anymore. He is wondering if there is a medication she can take prn.  -She notes that her mood has been good. She continues on Lexapro and Trazodone at night. She takes trazodone most nights and doesn't have any concerns with sleep.  -  and daughter concerned with occasional tearfulness.  -Pt's  notes that he thinks her \"melancholy\" is related to her memory changes and would just like to make her comfortable and happy.    Hearing:  -Pt has hearing aids and will wear them when leaving the house. Daughter notes that she does well in one-on-one and small group conversations, but struggles with large groups. Pt denies concerns with her hearing.     Separate conversation with daughter alone:   -Daughter says that her father has started attending an Alzheimer's support group which is a large step for him. However, she thinks they are both very resistant to further testing. She thinks that her mother experiences sun-downing which is a cause of a " lot of her anxiety when going out, as discussed by her father. Daughter notes that her mother has been more tearful and she worries about her depression. It is very difficult to get her to leave the house, notes it was very difficult to get her to come to her physical today.   -There have been times of paranoia, thinking that neighbors are watching her through the window. She questions instances of hallucinations.   -Daughter's main goal is her mother's comfort. She agrees that visiting neurology is a good plan, but doesn't want any brain scans.   (daughter is a )    Do you feel safe in your environment? Yes    Have you ever done Advance Care Planning? (For example, a Health Directive, POLST, or a discussion with a medical provider or your loved ones about your wishes): No, advance care planning information given to patient to review.  Patient declined advance care planning discussion at this time.  Daughter is going to help, they have info at home    Fall risk  Fallen 2 or more times in the past year?: No  Any fall with injury in the past year?: No    Cognitive Screening not appropriate due to memory changes documented on her problem list      Mini-CogTM Copyright RAULITO King. Licensed by the author for use in [facility]; reprinted with permission (annia@[facility]). All rights reserved.      Do you have sleep apnea, excessive snoring or daytime drowsiness?: no    Reviewed and updated as needed this visit by clinical staff  Tobacco  Allergies  Meds  Problems  Med Hx  Surg Hx  Fam Hx  Soc Hx          Reviewed and updated as needed this visit by Provider  Allergies  Meds  Problems        Social History     Tobacco Use     Smoking status: Former Smoker     Packs/day: 0.50     Years: 11.00     Pack years: 5.50     Types: Cigarettes     Last attempt to quit: 1981     Years since quittin.3     Smokeless tobacco: Never Used   Substance Use Topics     Alcohol use: Yes     Comment:  2/wk     If you drink alcohol do you typically have >3 drinks per day or >7 drinks per week? No      Hyperlipidemia Follow-Up    Are you regularly taking any medication or supplement to lower your cholesterol?   Yes- statin therapy     Are you having muscle aches or other side effects that you think could be caused by your cholesterol lowering medication?  No    Hypertension Follow-up    Do you check your blood pressure regularly outside of the clinic? No     Are you following a low salt diet? Yes    Are your blood pressures ever more than 140 on the top number (systolic) OR more   than 90 on the bottom number (diastolic), for example 140/90? Not checking      Current providers sharing in care for this patient include:   Patient Care Team:  Susan Calhoun MD as PCP - General (Family Practice)  Susan Calhoun MD as Assigned PCP    The following health maintenance items are reviewed in Epic and correct as of today:  Health Maintenance   Topic Date Due     ADVANCE CARE PLANNING  08/13/2017     PHQ-2  01/01/2019     COLONOSCOPY  09/22/2019     MEDICARE ANNUAL WELLNESS VISIT  09/26/2019     FALL RISK ASSESSMENT  09/26/2019     BMP  10/04/2019     LIPID  10/04/2019     ZOSTER IMMUNIZATION (3 of 3) 12/03/2019     MAMMO SCREENING  10/22/2020     DTAP/TDAP/TD IMMUNIZATION (2 - Td) 11/14/2022     HEPATITIS C SCREENING  Completed     INFLUENZA VACCINE  Completed     PNEUMOCOCCAL IMMUNIZATION 65+ LOW/MEDIUM RISK  Completed     DEXA  Addressed     IPV IMMUNIZATION  Aged Out     MENINGITIS IMMUNIZATION  Aged Out     Patient Active Problem List   Diagnosis     Hyperlipidemia LDL goal <130     HTN, goal below 140/90     Adenomatous colon polyp     Anxiety     Abnormal blood sugar     Memory changes     Past Surgical History:   Procedure Laterality Date     APPENDECTOMY       COLONOSCOPY       HYSTERECTOMY, PAP NO LONGER INDICATED  1995    for reasons of fibroids     SURGICAL HISTORY OF -   grade school     "neck cyst removed     SURGICAL HISTORY OF -       right cyst removed on wrist       Social History     Tobacco Use     Smoking status: Former Smoker     Packs/day: 0.50     Years: 11.00     Pack years: 5.50     Types: Cigarettes     Last attempt to quit: 1981     Years since quittin.3     Smokeless tobacco: Never Used   Substance Use Topics     Alcohol use: Yes     Comment: 2/wk     Family History   Problem Relation Age of Onset     Heart Disease Mother      Diabetes Mother              Hypertension Mother      Cancer Father      Respiratory Father      Prostate Cancer Father              Alzheimer Disease Paternal Grandmother      Prostate Cancer Paternal Grandfather      Prostate Cancer Brother      Breast Cancer Sister      Diabetes Maternal Grandmother              Breast Cancer Sister      Prostate Cancer Brother              Review of Systems   Constitutional: Negative for chills and fever.   HENT: Positive for hearing loss. Negative for congestion and ear pain.    Eyes: Negative for pain.   Respiratory: Negative for cough.    Cardiovascular: Negative for chest pain.   Gastrointestinal: Negative for abdominal pain, constipation, diarrhea, heartburn and hematochezia.   Genitourinary: Negative for frequency, genital sores and hematuria.   Musculoskeletal: Negative for arthralgias.   Neurological: Positive for headaches. Negative for dizziness.   Psychiatric/Behavioral: The patient is not nervous/anxious.    wears hearing aids     This document serves as a record of the services and decisions personally performed by SHANTEL BARTHOLOMEW. It was created on his/her behalf by Kenyatta Palmer, a trained medical scribe. The creation of this document is based on the provider's statements to the medical scribe. Kenyatta Palmer, 2019 10:09 AM    OBJECTIVE:   /74   Pulse 66   Temp 98  F (36.7  C) (Oral)   Ht 1.588 m (5' 2.5\")   Wt 64 kg (141 lb)   SpO2 99%   " "BMI 25.38 kg/m   Estimated body mass index is 25.38 kg/m  as calculated from the following:    Height as of this encounter: 1.588 m (5' 2.5\").    Weight as of this encounter: 64 kg (141 lb).  Physical Exam  GENERAL APPEARANCE: healthy, alert and no distress  EYES: Slight redness of the conjunctivae, PERRL, sclerae normal  HENT: ear canals and TM's normal, nose and mouth without ulcers or lesions, oropharynx clear, slight dryness of the inside of her mouth but no lesions   NECK: no adenopathy, no asymmetry, masses, or scars and thyroid normal to palpation  RESP: lungs clear to auscultation - no rales, rhonchi or wheezes  CV: regular rate and rhythm, normal S1 S2, no S3 or S4, no murmur, click or rub, no peripheral edema and peripheral pulses strong  ABDOMEN: soft, nontender, no hepatosplenomegaly, no masses and bowel sounds normal  MS: no musculoskeletal defects are noted and gait is age appropriate without ataxia  SKIN: no suspicious lesions or rashes  NEURO: Normal strength and tone, sensory exam grossly normal, mentation intact and speech normal  PSYCH: mentation appears at baseline, affect normal/bright    Diagnostic Test Results:  Labs reviewed in Epic    ASSESSMENT / PLAN:   (Z00.00) Routine general medical examination at a health care facility  (primary encounter diagnosis)  Comment:   Plan: did not discuss colonoscopy today - as had other more pressing issues.    (E78.5) Hyperlipidemia LDL goal <130  Comment: Adjust therapy based on labs  Plan: Lipid panel reflex to direct LDL Fasting,         simvastatin (ZOCOR) 10 MG tablet        adjust therapy based on labs      (I10) HTN, goal below 140/90  Comment: At goal.   Plan: BASIC METABOLIC PANEL, metoprolol succinate ER         (TOPROL-XL) 50 MG 24 hr tablet        Continue current medication      (F41.9) Anxiety  Comment: Increased, ? How memory changes are contributing to anxiety.   Plan: escitalopram (LEXAPRO) 20 MG tablet, traZODone         (DESYREL) 50 MG " "tablet        Given the dry mouth and tongue pain we may need to consider adjustments to medication, but we also need to control anxiety as well.  So will await ENT consultation first.    (R41.3) Memory changes  Comment: Suspect Alzheimer's.  I have been urging neurology consultation and imaging since 2017 due to young age and symptoms.  Pt and her  have been resistant to testing in the past and are more open to the idea of it now given her increased anxiety.   Plan: TSH with free T4 reflex, NEUROLOGY ADULT         REFERRAL        Believe we would benefit from clear diagnosis given early onset of symptoms and relative progression.  Daughter agrees with this, as does  at this time.  They decline imaging but with consult with neurology.      (R68.2) Dry mouth  Comment: wonder if this is related to dehydration, medication side effects, SICCA, or combination  Plan: advise ENT consultation, although could also consider medication adjustments.  Will determine based on how quickly she can get in to ENT.    (K14.6) Tongue pain  Comment: Chewing gum improves the pain, otherwise nothing else helps. At this point unsure what is causing pain. Adjust therapy based on labs.   Plan: CBC with platelets, Vitamin B12, OTOLARYNGOLOGY        REFERRAL        See above    (G47.00) Insomnia, unspecified type  Comment: Controlled with trazodone.   Plan: traZODone (DESYREL) 50 MG tablet        Continue for now, may consider decreasing dosage.      COUNSELING:  Reviewed preventive health counseling, as reflected in patient instructions       Regular exercise       Healthy diet/nutrition       Vision screening       Hearing screening       Bladder control       Immunizations       Advanced Planning     Estimated body mass index is 25.38 kg/m  as calculated from the following:    Height as of this encounter: 1.588 m (5' 2.5\").    Weight as of this encounter: 64 kg (141 lb).         reports that she quit smoking about 38 years " ago. Her smoking use included cigarettes. She has a 5.50 pack-year smoking history. She has never used smokeless tobacco.      Appropriate preventive services were discussed with this patient, including applicable screening as appropriate for cardiovascular disease, diabetes, osteopenia/osteoporosis, and glaucoma.  As appropriate for age/gender, discussed screening for colorectal cancer, prostate cancer, breast cancer, and cervical cancer. Checklist reviewing preventive services available has been given to the patient.    Reviewed patients plan of care and provided an AVS. The Intermediate Care Plan ( asthma action plan, low back pain action plan, and migraine action plan) for Shari meets the Care Plan requirement. This Care Plan has been established and reviewed with the Patient and spouse and daughter.    Patient Instructions     I recommend you schedule an appointment with ENT to evaluate your tongue symptoms. You can try drinking milk to see if the calcium helps ease the discomfort.     Drink more water. Keep it readily available on the counter as an additional reminder to drink it.     I recommend you schedule an appointment with neurology. And I will be in touch later today or early next week about a couple of recommended Neurologists.    Schedule an eye appointment.     Patient Education     [facility]   Discharged by : Kimberlee Lima MA    If you have any questions regarding your visit please contact your care team:     Team Gold                Clinic Hours Telephone Number     Dr. Rosita Marr, CNP 7am-7pm  Monday - Thursday   7am-5pm  Fridays  [phone]   (Appointment scheduling available 24/7)     RN Line  [phone] option 2     Urgent Care - [facility] - 11am-9pm Monday-Friday Saturday-Sunday- 9am-5pm     [place] -   5pm-9pm Monday-Friday Saturday-Sunday- 9am-5pm    [phone] - [place]    [phone]  [phone] - [place]     For a Price Quote for your services, please call our Consumer Price Line at [phone].     What options do I have for visits at the clinic other than the traditional office visit?     To expand how we care for you, many of our providers are utilizing electronic visits (e-visits) and telephone visits, when medically appropriate, for interactions with their patients rather than a visit in the clinic. We also offer nurse visits for many medical concerns. Just like any other service, we will bill your insurance company for this type of visit based on time spent on the phone with your provider. Not all insurance companies cover these visits. Please check with your medical insurance if this type of visit is covered. You will be responsible for any charges that are not paid by your insurance.   E-visits via [employer]: generally incur a $45.00 fee.     Telephone visits:  Time spent on the phone: *charged based on time that is spent on the phone in increments of 10 minutes. Estimated cost:   5-10 mins $30.00   11-20 mins. $59.00   21-30 mins. $85.00     Use [employer] (secure email communication and access to your chart) to send your primary care provider a message or make an appointment. Ask someone on your Team how to sign up for SendGrid.     As always, Thank you for trusting us with your health care needs!    [facility]:    Scheduling Appointments  [facility]  Call: [phone]    [facility]  Call: [phone]    [facility]  Call: [phone]    For Gastroenterology referrals   [facility]   Clinics and Surgery Center, 4th Floor   [address] [place] [id number]   Appointments: [phone]    WHERE TO GO FOR CARE?  Clinic    Make an appointment if you:       Are sick (cold, cough, flu, sore throat, earache or in pain).       Have a small injury (sprain, small cut, burn or broken  bone).       Need a physical exam, Pap smear, vaccine or prescription refill.       Have questions about your health or medicines.    To reach us:      Call [phone] ([phone]). Open 24 hours every day. (For counseling services, call [phone].)    Log into [employer] at [employer]. (Visit [employer] to create an account.) Hospital emergency room    An emergency is a serious or life- threatening problem that must be treated right away.    Call 911 or get to the hospital if you have:      Very bad or sudden:            - Chest pain or pressure         - Bleeding         - Head or belly pain         - Dizziness or trouble seeing, walking or                          Speaking      Problems breathing      Blood in your vomit or you are coughing up blood      A major injury (knocked out, loss of a finger or limb, rape, broken bone protruding from skin)    A mental health crisis. (Or call the Mental Health Crisis line at 1-151.950.7841 or Suicide Prevention Hotline at 1-432.967.5354.)    Open 24 hours every day. You don't need an appointment.     Urgent care    Visit urgent care for sickness or small injuries when the clinic is closed. You don't need an appointment. To check hours or find an urgent care near you, visit [email or web address]. Online care    Get online care from [facility] for more than 70 common problems, like colds, allergies and infections. Open 24 hours every day at:   [email or web address]   Need help deciding?    For advice about where to be seen, you may call your clinic and ask to speak with a nurse. We're here for you 24 hours every day.         If you are deaf or hard of hearing, please let us know. We provide many free services including sign language interpreters, oral interpreters, TTYs, telephone amplifiers, note takers and written materials.               Counseling Resources:  ATP IV Guidelines  Pooled Cohorts Equation Calculator  Breast Cancer Risk Calculator  FRAX Risk  Assessment  ICSI Preventive Guidelines  Dietary Guidelines for Americans, 2010  USDA's MyPlate  ASA Prophylaxis  Lung CA Screening    The information in this document, created by the medical scribe for me, accurately reflects the services I personally performed and the decisions made by me. I have reviewed and approved this document for accuracy.   Susan Isabel MD  [facility]    Identified Health Risks:

## 2019-12-06 NOTE — TELEPHONE ENCOUNTER
Please call patient's daughter Jose (who was with her at the appointment today)    After speaking with my Neurology colleague, she recommends either   Dr. Estuardo Jason at the [facility]   OR  DR. Vamshi Marcial at the [facility] or [place]

## 2019-12-09 ENCOUNTER — TELEPHONE (OUTPATIENT)
Dept: FAMILY MEDICINE | Facility: CLINIC | Age: 69
End: 2019-12-09

## 2019-12-09 NOTE — TELEPHONE ENCOUNTER
Called and spoke with Kris and relayed the message below. He stated the patient in taking a full tablet at night and verbalized understanding to cut it to 1/2 tablet. He stated he has a pill cutter as well.     Yolanda Romero RN

## 2019-12-09 NOTE — TELEPHONE ENCOUNTER
After further consideration, while we are awaiting ENT consultation, I believe it would be helpful to lower Soraya's trazodone dosing.  Trazodone can cause a dry mouth, and although she has been taking this for some time without side effects, now that she is not consuming as many fluids daily, it may be helpful to lower the dose to see if this is now causing side effects.    Please confirm whether or not she is taking 1/2 or full tablet nightly.   If full tablet, would reduce down to 1/2 tablet.  If 1/2 tablet we may want to consider taking her off of it, to see if it is needed and to see if side effects are cause of tongue pain.    Please contact  to discuss (Soraya has dementia and would be unable to easily discuss)

## 2019-12-10 DIAGNOSIS — F41.9 ANXIETY: ICD-10-CM

## 2019-12-10 DIAGNOSIS — G47.00 INSOMNIA, UNSPECIFIED TYPE: ICD-10-CM

## 2019-12-10 RX ORDER — TRAZODONE HYDROCHLORIDE 50 MG/1
25 TABLET, FILM COATED ORAL AT BEDTIME
Qty: 45 TABLET | Refills: 0
Start: 2019-12-10 | End: 2020-08-27

## 2020-02-27 ENCOUNTER — TELEPHONE (OUTPATIENT)
Dept: FAMILY MEDICINE | Facility: CLINIC | Age: 70
End: 2020-02-27

## 2020-02-27 ENCOUNTER — TELEPHONE (OUTPATIENT)
Dept: NEUROLOGY | Facility: CLINIC | Age: 70
End: 2020-02-27

## 2020-02-27 ENCOUNTER — DOCUMENTATION ONLY (OUTPATIENT)
Dept: PSYCHOLOGY | Facility: CLINIC | Age: 70
End: 2020-02-27

## 2020-02-27 ENCOUNTER — OFFICE VISIT (OUTPATIENT)
Dept: NEUROLOGY | Facility: CLINIC | Age: 70
End: 2020-02-27
Attending: FAMILY MEDICINE
Payer: COMMERCIAL

## 2020-02-27 ENCOUNTER — PATIENT OUTREACH (OUTPATIENT)
Dept: CARE COORDINATION | Facility: CLINIC | Age: 70
End: 2020-02-27

## 2020-02-27 VITALS
HEART RATE: 90 BPM | RESPIRATION RATE: 16 BRPM | OXYGEN SATURATION: 97 % | DIASTOLIC BLOOD PRESSURE: 93 MMHG | HEIGHT: 63 IN | WEIGHT: 141 LBS | SYSTOLIC BLOOD PRESSURE: 178 MMHG | BODY MASS INDEX: 24.98 KG/M2

## 2020-02-27 DIAGNOSIS — F03.91 DEMENTIA WITH BEHAVIORAL DISTURBANCE, UNSPECIFIED DEMENTIA TYPE: Primary | ICD-10-CM

## 2020-02-27 PROCEDURE — 99204 OFFICE O/P NEW MOD 45 MIN: CPT | Performed by: PSYCHIATRY & NEUROLOGY

## 2020-02-27 ASSESSMENT — MIFFLIN-ST. JEOR: SCORE: 1120.76

## 2020-02-27 NOTE — TELEPHONE ENCOUNTER
Reason for Call:  Other call back    Detailed comments: Daughter Gracie would like a callback to discuss Soraya's appointment with Neurology today. Soraya was diagnosed with Dementia.   Daughter would like a callback to discuss this further. She was under the impression that Soraya would start on a medication to help her out. Neurology sent over information for Dr. Calhoun to view.   Gracie states that they would like to get her on some medication, and that getting her into her appointment today was hell.     Phone Number Patient can be reached at: Other phone number:  [phone] - Gracie     Best Time: As soon as possible    Can we leave a detailed message on this number? YES    Call taken on 2/27/2020 at 1:21 PM by Deanna Bynum

## 2020-02-27 NOTE — TELEPHONE ENCOUNTER
"Called daughter back, no consent to communicate on file, daughter verbalized she is aware. Power of  paperwork was filled out at neurology appointment and patient's  is listed as POA. With the diagnosis of dementia how do we proceed with communication? Can provider overrule consent to communicate in this situation?     Patient daughter reports that her mothers dementia has progressively worsen and she is paranoid with hallucinations. She reports neurology was sending medication recommendations to PCP. Daughter is \"begging and pleading\" to get something sent in for the patient. She reports the appointment today was \"hell\" and her mother is very angry with her to the point that she would not be able to get her in to see PCP until her behaviors are improved. Daughter was crying and upset and stated they just want the patient to be more comfortable and less symptoms.     Nurse sees OV notes from appointment today. Routed to provider to review and advise. Preferred pharmacy cued. Thank you.     Yolanda Romero RN    "

## 2020-02-27 NOTE — PROGRESS NOTES
Social Work Intervention  [facility] [place]    Data/Intervention: 2020    Patient Name:  Shari Graham  /Age:  1950 (69 year old)    Visit Type: in person  Referral Source: neurology - Dr Marcial  Reason for Referral:  POA and chelsi    Collaborated With:    -Soraya  -Kris ()  -Luis (son)  - Elva (daughter)     Patient Concerns/Issues:   Pt presented today for neurology clinic appointment for an assessment related to memory changes.  Soraya became agitated and resistant to having appointment. Family was eventually able to calm Soraya and have her be seen by Dr Marcial    Intervention/Education/Resources Provided:  Writer met with family members while Soraya and son, Luis met with neurologist.  Family was asking for assistance in attaining POA.    Writer was able to sit with Soraya following neurology appointment. After explaining to Soraya about the POA, she was able to say she'd like there Kris be appointed as -in-fact.  She was also able to identify her son, Luis and daughter Elva as successors.    Family was very appreciative to have this completed today. Provided family with writer contact information and encouraged them to call with any additional concerns or questions. Sw will continue to assist as needed.          ELVIRA Araiza, [facility]    [facility]  [phone]  [email or web address]

## 2020-02-27 NOTE — PROGRESS NOTES
Visit Date:   02/27/2020      NEUROLOGY CONSULTATION      A patient of Dr. Susan Calhoun in [place].      This patient is a 69-year-old right-handed woman seen for evaluation of cognitive impairment.  She is here with her family, but only her son came into the examining room with her.  His name is Luis.  She did tell me that I could discuss her case with Luis if anything came up.  Her , daughter and daughter-in-law were in the waiting room.  At first, the patient did not want to come into the exam room.  She just wanted to go home.  She was resistant and getting very anxious about the evaluation.  Finally, she did consent to go into the exam room.  She does not offer much history.  She is pleasant and agreeable, although kept saying she would just like to go home and would make motions to get out of her chair and grab her coat.  However, she was able to stay for the evaluation.  Luis answered many of the questions.  There have been issues with forgetfulness going back for 5 years.  It has also been difficult for the patient to follow conversations.  She has had anxiety for many years as well as insomnia, but it has become much more prominent lately.  She has had issues with headache, but she says the headaches have largely resolved and she has not been having headaches lately.  She does not have issues with speech, swallowing, vision or hearing.  She does not have focal symptoms in her arms or legs such as numbness, tingling or weakness.  She does go walking with her , although he was not present to corroborate the degree of their walking activities.  She says that she reads whatever books are available, but she was not able to provide any details in that regard.  She has no problem with bowel or bladder control.  She has not been driving.  Her  does the meals.  They will go shopping together.  She and her  live in their own home and have been there for 40 years.  Her diet  is good.      PAST MEDICAL HISTORY:  Significant for high blood pressure.  She is on metoprolol.  She also has elevated cholesterol and is on medication in that regard.  She is on trazodone to help with insomnia.  She is on escitalopram for her anxiety and has been on it for quite a while, but it is not clear it is doing much for her.  She does not have issues with thyroid or asthma.  She has not had pertinent surgery or trauma to the head or neck.  She has a rare alcoholic beverage.  She does not smoke.      SOCIAL HISTORY:  She has 3 children.      FAMILY HISTORY:  Positive for diabetes.      PHYSICAL EXAMINATION:   GENERAL:  The patient is cooperative and in no distress.   VITAL SIGNS:  Her blood pressure is 178/93.  She was quite anxious at first, but then settled down.   NECK:  There are no carotid bruits.   HEART:  Auscultation of the heart shows S1, S2.   NEUROLOGIC:  The patient basically refused to participate in bedside cognitive testing.  She said that she could not remember any words.  She did not want to answer questions about orientation.  Her speech is fluent, but she does appear to have difficulty in understanding certain commands, especially as they relate to body movement.  For example, when asked to tap her fingers, she just looked at her hand.  When I demonstrated what I wanted, then she would mimic it, but could not do it spontaneously.  Luis acknowledged that the patient does seem to have trouble understanding language at times and comprehension has been an issue.  Cranial nerve testing shows full visual fields to confrontation.  When asked to count fingers, regardless of the number of fingers presented in the visual fields, her answer was in ascending order of 1-2-3-4.  Funduscopic exam shows sharp discs bilaterally.  Eye movements are complete and conjugate.  Facial sensation is normal.  Face moves symmetrically.  Palate elevates in the midline.  Tongue protrudes in the midline.  Motor  evaluation shows no pronator drift.  She moves her arms and legs equally well, but details strength testing could not be reliably performed.  Muscle stretch reflexes are low amplitude and symmetric.  Toes are downgoing.  Sensory exam shows preserved vibration and temperature in the hands and preserved vibration in the toes.  She easily gets on and off the exam table and ambulates about the room.  Of note, Luis did comment that the patient is not able to fasten her seatbelt and that this sort of activity has presented a challenge to her for the last couple of years.  She is able to use food utensils without difficulty.      She did have lab work performed.  A vitamin B12 level was noted to be 260.  TSH normal.  CBC unremarkable and basic metabolic panel unremarkable.      ASSESSMENT:  Dementia with anxiety.      DISCUSSION:  This patient is seen for evaluation of cognitive impairment.  The patient has significant cognitive impairment, especially with language function and memory.  She also has what appears to be apraxia.  For evaluation, I recommended imaging of the brain to rule out structural lesions.  She flatly refused to participate in any sort of imaging examination.  Her vitamin B12 level is borderline low, so I did recommend that she begin taking a supplement 500 mcg oral dissolving daily.  Luis will see to that.  She refused to have a methylmalonic acid level checked.  I am going to order Physical and Occupational Therapy evaluations in the home, including a cognitive performance test, to see what services might be offered to support the patient in her current residence.  This will be arranged.      For the anxiety, a trial of low-dose Depakote could be considered.  The dose could be started at 250 mg and then slowly build up over weeks to a dose of 1000 mg.  The patient did not want me to give her a prescription for that, but did agree to discuss it with her primary care.  I will see her in followup in  a month for reexamination.         IRIS MARTIN MD             D: 2020   T: 2020   MT: JACKSON      Name:     EVA CHICAS   MRN:      [id number]        Account:      [id number]   :      1950           Visit Date:   2020      Document: [id number]

## 2020-02-27 NOTE — TELEPHONE ENCOUNTER
[facility] Call Center    Phone Message    May a detailed message be left on voicemail: yes     Reason for Call: Patient's daughter called wanting to know if there was going to be a Rx sent to the pharmacy for the patients mood. They were under the impression that it was already sent.She would like it sent to the [facility] on [address]. Please advise. Thank you.     Action Taken: Message routed to:  Adult Clinics: Neurology p [id number]    Travel Screening: Not Applicable

## 2020-02-27 NOTE — PROGRESS NOTES
Patient arrived to her neurology appointment with her , daughter, son, and daughter in law. Patient reportedly was upset and initially refusing to be seen though the family was insistent. AMIE Chavez assisted by staying with the patient and her son while I spoke in private with the rest of the family. We had no consent to communicate or power of  in place and the discussion remained general in nature and no protected health information was revealed. The family expressed their concerns and their needs. AMIE Araiza came to assist the family in getting the appropriate documents signed by the patient and the rest of the family. The patient agreed to be seen by Dr. Marcial along with her son. No further contact with [facility] is scheduled at this time but the family is free to reach out for assistance should the need arise.

## 2020-02-27 NOTE — NURSING NOTE
"Shari Graahm's goals for this visit include: New  She requests these members of her care team be copied on today's visit information:     PCP: Susan Calhoun    Referring Provider:  Susan Calhoun MD  [facility] [place] [address]    BP (!) 178/93   Pulse 90   Resp 16   Ht 1.588 m (5' 2.5\")   Wt 64 kg (141 lb)   SpO2 97%   BMI 25.38 kg/m      Do you need any medication refills at today's visit? No  "

## 2020-02-27 NOTE — TELEPHONE ENCOUNTER
They were informed that Dr Marcial sent a message to the pt's PCP with recommendations on a medication that could help with her mood.     She had no further questions and will reach out to her PCP for further guidance.  Ailyn Bill, RNCC  Neurology

## 2020-02-27 NOTE — LETTER
2/27/2020         RE: Shari Graham  [address] [address]        Dear Colleague,    Thank you for referring your patient, Shari Graham, to the [facility]. Please see a copy of my visit note below.    Visit Date:   02/27/2020      NEUROLOGY CONSULTATION      A patient of Dr. Susan Calhoun in [place].      This patient is a 69-year-old right-handed woman seen for evaluation of cognitive impairment.  She is here with her family, but only her son came into the examining room with her.  His name is Luis.  She did tell me that I could discuss her case with Luis if anything came up.  Her , daughter and daughter-in-law were in the waiting room.  At first, the patient did not want to come into the exam room.  She just wanted to go home.  She was resistant and getting very anxious about the evaluation.  Finally, she did consent to go into the exam room.  She does not offer much history.  She is pleasant and agreeable, although kept saying she would just like to go home and would make motions to get out of her chair and grab her coat.  However, she was able to stay for the evaluation.  Luis answered many of the questions.  There have been issues with forgetfulness going back for 5 years.  It has also been difficult for the patient to follow conversations.  She has had anxiety for many years as well as insomnia, but it has become much more prominent lately.  She has had issues with headache, but she says the headaches have largely resolved and she has not been having headaches lately.  She does not have issues with speech, swallowing, vision or hearing.  She does not have focal symptoms in her arms or legs such as numbness, tingling or weakness.  She does go walking with her , although he was not present to corroborate the degree of their walking activities.  She says that she reads whatever books are available, but she was not able to  provide any details in that regard.  She has no problem with bowel or bladder control.  She has not been driving.  Her  does the meals.  They will go shopping together.  She and her  live in their own home and have been there for 40 years.  Her diet is good.      PAST MEDICAL HISTORY:  Significant for high blood pressure.  She is on metoprolol.  She also has elevated cholesterol and is on medication in that regard.  She is on trazodone to help with insomnia.  She is on escitalopram for her anxiety and has been on it for quite a while, but it is not clear it is doing much for her.  She does not have issues with thyroid or asthma.  She has not had pertinent surgery or trauma to the head or neck.  She has a rare alcoholic beverage.  She does not smoke.      SOCIAL HISTORY:  She has 3 children.      FAMILY HISTORY:  Positive for diabetes.      PHYSICAL EXAMINATION:   GENERAL:  The patient is cooperative and in no distress.   VITAL SIGNS:  Her blood pressure is 178/93.  She was quite anxious at first, but then settled down.   NECK:  There are no carotid bruits.   HEART:  Auscultation of the heart shows S1, S2.   NEUROLOGIC:  The patient basically refused to participate in bedside cognitive testing.  She said that she could not remember any words.  She did not want to answer questions about orientation.  Her speech is fluent, but she does appear to have difficulty in understanding certain commands, especially as they relate to body movement.  For example, when asked to tap her fingers, she just looked at her hand.  When I demonstrated what I wanted, then she would mimic it, but could not do it spontaneously.  Luis acknowledged that the patient does seem to have trouble understanding language at times and comprehension has been an issue.  Cranial nerve testing shows full visual fields to confrontation.  When asked to count fingers, regardless of the number of fingers presented in the visual fields, her  answer was in ascending order of 1-2-3-4.  Funduscopic exam shows sharp discs bilaterally.  Eye movements are complete and conjugate.  Facial sensation is normal.  Face moves symmetrically.  Palate elevates in the midline.  Tongue protrudes in the midline.  Motor evaluation shows no pronator drift.  She moves her arms and legs equally well, but details strength testing could not be reliably performed.  Muscle stretch reflexes are low amplitude and symmetric.  Toes are downgoing.  Sensory exam shows preserved vibration and temperature in the hands and preserved vibration in the toes.  She easily gets on and off the exam table and ambulates about the room.  Of note, Luis did comment that the patient is not able to fasten her seatbelt and that this sort of activity has presented a challenge to her for the last couple of years.  She is able to use food utensils without difficulty.      She did have lab work performed.  A vitamin B12 level was noted to be 260.  TSH normal.  CBC unremarkable and basic metabolic panel unremarkable.      ASSESSMENT:  Dementia with anxiety.      DISCUSSION:  This patient is seen for evaluation of cognitive impairment.  The patient has significant cognitive impairment, especially with language function and memory.  She also has what appears to be apraxia.  For evaluation, I recommended imaging of the brain to rule out structural lesions.  She flatly refused to participate in any sort of imaging examination.  Her vitamin B12 level is borderline low, so I did recommend that she begin taking a supplement 500 mcg oral dissolving daily.  Luis will see to that.  She refused to have a methylmalonic acid level checked.  I am going to order Physical and Occupational Therapy evaluations in the home, including a cognitive performance test, to see what services might be offered to support the patient in her current residence.  This will be arranged.      For the anxiety, a trial of low-dose Depakote  could be considered.  The dose could be started at 250 mg and then slowly build up over weeks to a dose of 1000 mg.  The patient did not want me to give her a prescription for that, but did agree to discuss it with her primary care.  I will see her in followup in a month for reexamination.         IRIS MARCIAL MD             D: 2020   T: 2020   MT: JACKSON      Name:     EVA CHICAS   MRN:      [id number]        Account:      [id number]   :      1950           Visit Date:   2020      Document: [id number]       Again, thank you for allowing me to participate in the care of your patient.        Sincerely,        Iris Marcial MD

## 2020-02-28 ENCOUNTER — TELEPHONE (OUTPATIENT)
Dept: FAMILY MEDICINE | Facility: CLINIC | Age: 70
End: 2020-02-28

## 2020-02-28 DIAGNOSIS — F02.81 ALZHEIMER'S DEMENTIA WITH BEHAVIORAL DISTURBANCE, UNSPECIFIED TIMING OF DEMENTIA ONSET: Primary | ICD-10-CM

## 2020-02-28 DIAGNOSIS — G30.9 ALZHEIMER'S DEMENTIA WITH BEHAVIORAL DISTURBANCE, UNSPECIFIED TIMING OF DEMENTIA ONSET: Primary | ICD-10-CM

## 2020-02-28 RX ORDER — DIVALPROEX SODIUM 250 MG/1
250 TABLET, DELAYED RELEASE ORAL DAILY
Qty: 30 TABLET | Refills: 1 | Status: SHIPPED | OUTPATIENT
Start: 2020-02-28 | End: 2020-05-19

## 2020-02-28 NOTE — TELEPHONE ENCOUNTER
Reason for Call:  Other returning call    Detailed comments: Pt's daughter states she just missed a call from Dr Calhoun.    Phone Number Patient can be reached at: Other phone number:  [phone]    Best Time: Anytime    Can we leave a detailed message on this number? NO    Call taken on 2/28/2020 at 11:49 AM by Cynthia Julio

## 2020-02-28 NOTE — TELEPHONE ENCOUNTER
Copied note below into current open encounter.  Closing this one.    Yanna Peña RN  [facility]

## 2020-02-28 NOTE — TELEPHONE ENCOUNTER
I called  Home Care intake again.   All the RN coordinators (who could potentially take order) are in a meeting so my call was transferred to the intake clinical coordinator voicemail and I was advised to leave message requesting call back and Yanna Marks would return call.    I did leave message and requested call back to either the [facility] RN line at [phone] or the [facility] RN line [phone] (but be sure to indicate calling about a [facility] patient if using that number).    Await return call and plan to provide verbal order requesting hepatic panel and [facility] w platelets (as per future orders in Epic) to be drawn at intake visit if possible.    Yanna Peña, JOSÉ MIGUEL  [facility]

## 2020-02-28 NOTE — TELEPHONE ENCOUNTER
Spoke with Dr. Marcial and discussed their visit and his recommendations.    We can see if home care can draw labs, or we can wait until Soraya sees Dr. Marcial in a month to draw labs.      I will take with Alfredoon about getting medication started now, but need to discuss further with her.  Attempted to call Gracie, but had to leave a message.    Please see if Home Care RN has a visit planned and would be able to draw CBC and CMP

## 2020-02-28 NOTE — TELEPHONE ENCOUNTER
I see that Dr. Marcial was willing to send medication at the visit.  And then I see that a call was made to neurology to ask for medication to be sent.    That message was sent yesterday.  Could we please call neurology and ask that medication be sent - as the neurologist is the one who saw the patient yesterday and recommended medication?  If Dr. Marcial was ready to start medication (without additional labs) and was willing,  I see no reason why he couldn't send in medication as he advised.    If he is unwilling, I would ask that he contact me to discuss.

## 2020-02-28 NOTE — TELEPHONE ENCOUNTER
Talked to Gracie.  Explained risks of medication.  Please continue to pursue if home care can draw labs.  Discussed importance of follow up with neurology.  If Soraya is tolerating medication we can titrate up to 500 mg daily in 1-2 weeks.  So please contact Gracie in 1-2 weeks to see how things are going.

## 2020-02-28 NOTE — TELEPHONE ENCOUNTER
"I don't see any home care encounters or documents in media but I do see neurology ordered PT and OT home eval yesterday and home nursing today.    I called [phone], spoke to  Home Care intake.  They \"just\" got the referrals and is highly doubtful they will see her today per intake person.   Since not yet set up, I don't have a contact regarding relaying verbal order for lab.   Based on daughter's report of patient behavior, seems it might be difficult to do lab draw at this time as well.      Routed to provider as ALEJANDRO, also await call back from daughter who could alert us about home care visit timing as well.       Yanna Peña RN  [facility]          "

## 2020-02-28 NOTE — TELEPHONE ENCOUNTER
I called and clarified with Gracie what happened yesterday; she says neurology was very clear with her at the call back that the PCP was supposed to manage this at this point.   She says she cannot tell me why they are declining to send the recommended medication now.     She is expressing frustration at the poor care and communications and states it will be our responsibility if her mom ends up hospitalized due to out of control behavior and anxiety.    She again states she will be happy to have her mom follow up for lab in a couple of weeks after her behavior is controlled but again frustrated that a lab test takes precedence over a treatment and wonders why the liver test was not done at December visit if a medication start was anticipated.    Routed to PCP, neurologist and RN who spoke to family yesterday to address.    Yanna Peña RN  [facility]

## 2020-02-28 NOTE — TELEPHONE ENCOUNTER
Spoke with Yanna from [facility].    She received the verbal orders listed below.    Chani Maza RN

## 2020-02-28 NOTE — TELEPHONE ENCOUNTER
"I called and spoke to Gracie, she says family was with patient at the neurology visit which was 2.5 hours and involved nurse practitioner, , and neurologist.  Patient did not allow Gracie in the actual exam as she was very agitated and blaming Gracie for the ordeal but Gracie's brother was there and he thought the med was sent (did not understand the turn of events).      She says her MOM is who refused the med but Gracie says she is not making good choices as she is anxious and having hallucinations and is \"out of control\".  Gracie says she cannot bring her mom in for labs or another appointment until they get her on a med and decrease her behavior/anxiety issues, \"cannot put family through yesterday's ordeal again\".   She hope Dr. Isabel can send the Rx to pended pharmacy and then they will follow up with Dr. Iasbel or neuro once patient is under better control and do any needed labs then.    Also will have Shari's  sign consent to communicate at next office contact.    Routed to Dr. Isabel to address.    Yanna Peña RN  [facility]      "

## 2020-02-28 NOTE — TELEPHONE ENCOUNTER
Reason for Call:  Other returning call     Detailed comments: Pt's daughter states she just missed a call from Dr Calhoun.     Phone Number Patient can be reached at: Other phone number:  [phone]     Best Time: Anytime     Can we leave a detailed message on this number? NO     Call taken on 2/28/2020 at 11:49 AM by Cynthia Julio      Routed to Dr. Calhoun to call.    However, it appears Dr. Calhoun is gone for the day.  I sent instant message to RN at NE, she thinks Dr. Calhoun still in clinic and will alert her to follow up.    Yanna Peña RN  [facility]

## 2020-02-28 NOTE — TELEPHONE ENCOUNTER
We would need the  to give permission to communicate with children.    Is there a reason why they did not agree to have the neurologist prescribe medication yesterday?  The neurologist was prepared to start the patient on medication, and is the one that will be seeing the patient in follow up for this medication.    I am certainly willing to start it for them, but would need to understand what the follow up plan is. And then, given that Soraya has become so much more anxious, would need to ensure that she will follow up.  Typically we also check liver tests before starting this medication - and I don't see that she has had any checked since 2015.  So would want to check those blood tests before we begin.

## 2020-03-02 ENCOUNTER — DOCUMENTATION ONLY (OUTPATIENT)
Dept: OTHER | Facility: CLINIC | Age: 70
End: 2020-03-02

## 2020-03-03 ENCOUNTER — TELEPHONE (OUTPATIENT)
Dept: FAMILY MEDICINE | Facility: CLINIC | Age: 70
End: 2020-03-03

## 2020-03-03 NOTE — TELEPHONE ENCOUNTER
Forms received from [facility]: order for SN to draw hepatic panel and cbc at intake, 2/28 for Rosita Calhoun MD.  Forms placed in provider 'sign me' folder.  Please fax forms to [phone] after completion.    Santiago Boykin

## 2020-03-06 ENCOUNTER — TELEPHONE (OUTPATIENT)
Dept: FAMILY MEDICINE | Facility: CLINIC | Age: 70
End: 2020-03-06

## 2020-03-06 NOTE — TELEPHONE ENCOUNTER
Reason for Call:  Other     Detailed comments: Yanna with [facility] calling to let pcp know that Patient spouse declined home care for now. Please advise.     Phone Number Patient can be reached at: Other phone number:  [phone]    Best Time: Anytime    Can we leave a detailed message on this number? YES    Call taken on 3/6/2020 at 4:45 PM by Rand Uribe

## 2020-03-06 NOTE — TELEPHONE ENCOUNTER
Routing to PCP to please advise.     Yanna was called back     Patient's spouse did not want home care services. He just wanted someone to come out and draw labs.    They were supposed to have an eval for PT and OT.     Chani Maza RN

## 2020-03-07 NOTE — TELEPHONE ENCOUNTER
I will be sure neurologist is aware, for their upcoming appointment with him.  And I will also try to contact family this week.

## 2020-03-10 DIAGNOSIS — F03.91 DEMENTIA WITH BEHAVIORAL DISTURBANCE, UNSPECIFIED DEMENTIA TYPE: ICD-10-CM

## 2020-03-10 LAB
ERYTHROCYTE [DISTWIDTH] IN BLOOD BY AUTOMATED COUNT: 13.4 % (ref 10–15)
HCT VFR BLD AUTO: 42.5 % (ref 35–47)
HGB BLD-MCNC: 13.7 G/DL (ref 11.7–15.7)
MCH RBC QN AUTO: 29.9 PG (ref 26.5–33)
MCHC RBC AUTO-ENTMCNC: 32.2 G/DL (ref 31.5–36.5)
MCV RBC AUTO: 93 FL (ref 78–100)
PLATELET # BLD AUTO: 236 10E9/L (ref 150–450)
RBC # BLD AUTO: 4.58 10E12/L (ref 3.8–5.2)
WBC # BLD AUTO: 6.9 10E9/L (ref 4–11)

## 2020-03-10 PROCEDURE — 36415 COLL VENOUS BLD VENIPUNCTURE: CPT | Performed by: FAMILY MEDICINE

## 2020-03-10 PROCEDURE — 85027 COMPLETE CBC AUTOMATED: CPT | Performed by: FAMILY MEDICINE

## 2020-03-10 PROCEDURE — 80076 HEPATIC FUNCTION PANEL: CPT | Performed by: FAMILY MEDICINE

## 2020-03-11 LAB
ALBUMIN SERPL-MCNC: 3.6 G/DL (ref 3.4–5)
ALP SERPL-CCNC: 72 U/L (ref 40–150)
ALT SERPL W P-5'-P-CCNC: 22 U/L (ref 0–50)
AST SERPL W P-5'-P-CCNC: 16 U/L (ref 0–45)
BILIRUB DIRECT SERPL-MCNC: <0.1 MG/DL (ref 0–0.2)
BILIRUB SERPL-MCNC: 0.4 MG/DL (ref 0.2–1.3)
PROT SERPL-MCNC: 7.2 G/DL (ref 6.8–8.8)

## 2020-04-08 ENCOUNTER — ALLIED HEALTH/NURSE VISIT (OUTPATIENT)
Dept: NURSING | Facility: CLINIC | Age: 70
End: 2020-04-08
Payer: COMMERCIAL

## 2020-04-08 DIAGNOSIS — Z23 NEED FOR SHINGLES VACCINE: Primary | ICD-10-CM

## 2020-04-08 PROCEDURE — 90471 IMMUNIZATION ADMIN: CPT

## 2020-04-08 NOTE — NURSING NOTE
Prior to immunization administration, verified patients identity using patient s name and date of birth. Please see Immunization Activity for additional information.     Screening Questionnaire for Adult Immunization    Are you sick today?   No   Do you have allergies to medications, food, a vaccine component or latex?   No   Have you ever had a serious reaction after receiving a vaccination?   No   Do you have a long-term health problem with heart, lung, kidney, or metabolic disease (e.g., diabetes), asthma, a blood disorder, no spleen, complement component deficiency, a cochlear implant, or a spinal fluid leak?  Are you on long-term aspirin therapy?   No   Do you have cancer, leukemia, HIV/AIDS, or any other immune system problem?   No   Do you have a parent, brother, or sister with an immune system problem?   No   In the past 3 months, have you taken medications that affect  your immune system, such as prednisone, other steroids, or anticancer drugs; drugs for the treatment of rheumatoid arthritis, Crohn s disease, or psoriasis; or have you had radiation treatments?   No   Have you had a seizure, or a brain or other nervous system problem?   No   During the past year, have you received a transfusion of blood or blood    products, or been given immune (gamma) globulin or antiviral drug?   No   For women: Are you pregnant or is there a chance you could become       pregnant during the next month?   No   Have you received any vaccinations in the past 4 weeks?   No     Immunization questionnaire answers were all negative.        Per orders of Dr. Calhoun, injection of Second Shingrix vaccines given by Terra Cross MA. Patient instructed to remain in clinic for 15 minutes afterwards, and to report any adverse reaction to me immediately.       Screening performed by Terra Cross MA on 4/8/2020 at 10:38 AM.  VIS for Shingles vaccines given on same date of administration.  Staff signature/Title: Terra Cross  MA on 4/8/2020 at 11:32 AM

## 2020-05-06 ENCOUNTER — TELEPHONE (OUTPATIENT)
Dept: NEUROLOGY | Facility: CLINIC | Age: 70
End: 2020-05-06

## 2020-05-06 NOTE — TELEPHONE ENCOUNTER
I called patients daughter - Gracie and left a message to callregarding Follow-up scheduled next week with Dr. Marcial. I did leave message that we can reschedule to video or phone appt.    Tomeka Delgado LPN

## 2020-05-14 ENCOUNTER — TELEPHONE (OUTPATIENT)
Dept: FAMILY MEDICINE | Facility: CLINIC | Age: 70
End: 2020-05-14

## 2020-05-14 DIAGNOSIS — F03.91 DEMENTIA WITH BEHAVIORAL DISTURBANCE, UNSPECIFIED DEMENTIA TYPE: ICD-10-CM

## 2020-05-18 NOTE — TELEPHONE ENCOUNTER
"Requested Prescriptions   Pending Prescriptions Disp Refills     divalproex sodium delayed-release (DEPAKOTE) 250 MG DR tablet [Pharmacy Med Name: DIVALPROEX 250MG DR(EC) TAB] 30 tablet 1     Sig: TAKE ONE TABLET BY MOUTH ONCE DAILY       Anti-Seizure Meds Protocol  Failed - 5/14/2020  8:18 AM        Failed - Review Authorizing provider's last note.      Refer to last progress notes: confirm request is for original authorizing provider (cannot be through other providers).          Failed - Depakote level within therapeutic range in past 26 months     No results found for: VALPROIC, MARIUM, GICHVAL  Depakote level must be checked 2-4 weeks after dosage change.          Passed - Recent (12 mo) or future (30 days) visit within the authorizing provider's specialty     Patient has had an office visit with the authorizing provider or a provider within the authorizing providers department within the previous 12 mos or has a future within next 30 days. See \"Patient Info\" tab in inbasket, or \"Choose Columns\" in Meds & Orders section of the refill encounter.              Passed - Normal CBC on file in past 26 months     Recent Labs   Lab Test 03/10/20  0926   WBC 6.9   RBC 4.58   HGB 13.7   HCT 42.5                    Passed - Normal ALT or AST on file in past 26 months     Recent Labs   Lab Test 03/10/20  0926   ALT 22     Recent Labs   Lab Test 03/10/20  0926   AST 16             Passed - Normal platelet count on file in past 26 months     Recent Labs   Lab Test 03/10/20  0926                  Passed - Medication is active on med list        Passed - No active pregnancy on record        Passed - No positive pregnancy test in last 12 months           Routing refill request to provider for review/approval because:  Labs not current:  See above  Thank you.       Yolanda Romero RN    "

## 2020-05-18 NOTE — TELEPHONE ENCOUNTER
I see that patient's neurology appointment that was scheduled for this month was changed to July.  The visit with Neurology still does need to occur in May - so Dr. Marcial can review the depakote's effectiveness and plans going forward.  (Dr. Marcial was originally planning to prescribe, but due to complex circumstances I ended up needing to prescribe as PCP with the plan that Dr. Marcial would evaluate in follow up and address ongoing Rx's.)    Okay to provide a short term 1-2 week refill to allow time for rescheduling of Neurology virtual consultation.    Thanks

## 2020-05-19 RX ORDER — DIVALPROEX SODIUM 250 MG/1
TABLET, DELAYED RELEASE ORAL
Qty: 14 TABLET | Refills: 0 | Status: SHIPPED | OUTPATIENT
Start: 2020-05-19 | End: 2020-07-02

## 2020-05-19 NOTE — TELEPHONE ENCOUNTER
Patient/family notified with understanding voiced, and 2 week supply sent per PCP below.    Cheri Fuller RN

## 2020-05-26 ENCOUNTER — VIRTUAL VISIT (OUTPATIENT)
Dept: NEUROLOGY | Facility: CLINIC | Age: 70
End: 2020-05-26
Payer: COMMERCIAL

## 2020-05-26 DIAGNOSIS — G30.1 LATE ONSET ALZHEIMER'S DISEASE WITH BEHAVIORAL DISTURBANCE (H): Primary | ICD-10-CM

## 2020-05-26 DIAGNOSIS — F02.818 LATE ONSET ALZHEIMER'S DISEASE WITH BEHAVIORAL DISTURBANCE (H): Primary | ICD-10-CM

## 2020-05-26 PROCEDURE — 99213 OFFICE O/P EST LOW 20 MIN: CPT | Mod: TEL | Performed by: PSYCHIATRY & NEUROLOGY

## 2020-05-26 RX ORDER — DIVALPROEX SODIUM 500 MG/1
500 TABLET, DELAYED RELEASE ORAL DAILY
Qty: 30 TABLET | Refills: 3 | Status: SHIPPED | OUTPATIENT
Start: 2020-05-26 | End: 2020-07-02

## 2020-05-26 NOTE — PROGRESS NOTES
"Shari Graham is a 69 year old female who is being evaluated via a billable telephone visit.      The patient has been notified of following:     \"This telephone visit will be conducted via a call between you and your physician/provider. We have found that certain health care needs can be provided without the need for a physical exam.  This service lets us provide the care you need with a short phone conversation.  If a prescription is necessary we can send it directly to your pharmacy.  If lab work is needed we can place an order for that and you can then stop by our lab to have the test done at a later time.    Telephone visits are billed at different rates depending on your insurance coverage. During this emergency period, for some insurers they may be billed the same as an in-person visit.  Please reach out to your insurance provider with any questions.    If during the course of the call the physician/provider feels a telephone visit is not appropriate, you will not be charged for this service.\"    Patient has given verbal consent for Telephone visit?  Yes    What phone number would you like to be contacted at? [phone]    How would you like to obtain your AVS? [facility]    Phone call duration: 15 minutes    Vamshi Marcial MD    "

## 2020-05-26 NOTE — LETTER
"    5/26/2020         RE: Shari Graham  [address] [address]        Dear Colleague,    Thank you for referring your patient, Shari Graham, to the [facility]. Please see a copy of my visit note below.    Shari Graham is a 69 year old female who is being evaluated via a billable telephone visit.      The patient has been notified of following:     \"This telephone visit will be conducted via a call between you and your physician/provider. We have found that certain health care needs can be provided without the need for a physical exam.  This service lets us provide the care you need with a short phone conversation.  If a prescription is necessary we can send it directly to your pharmacy.  If lab work is needed we can place an order for that and you can then stop by our lab to have the test done at a later time.    Telephone visits are billed at different rates depending on your insurance coverage. During this emergency period, for some insurers they may be billed the same as an in-person visit.  Please reach out to your insurance provider with any questions.    If during the course of the call the physician/provider feels a telephone visit is not appropriate, you will not be charged for this service.\"    Patient has given verbal consent for Telephone visit?  Yes    What phone number would you like to be contacted at? [phone]    How would you like to obtain your AVS? [facility]    Phone call duration: 15 minutes    Vamshi Marcial MD      Visit Date:   05/26/2020      NEUROLOGY CLINIC CONSULTATION      This is a telephone followup visit because of the viral epidemic.  Call initiated time 10:30, call terminated time 10:45.        HISTORY:  The patient was on speaker phone along with her daughter, Gracie.  I had initially seen the patient in clinic in February with her son, Luis.  Gracie was in the clinic, but not in the exam room because the " patient was quite agitated.  That visit concluded with a plan for her primary care to initiate therapy with Depakote.  The patient is taking 250 mg a day.  Gracie thinks it has helped her anxiety and her sadness, although there is room for improvement.  She continues to have oppositional behavior.  On Mother's Day, she was weeping and upset with her family because they had told her that her father was .  He has been  for 15 years.  The patient was in on the call today for part of the time.  She did become a little bit paranoid and was starting to get upset, thinking that her daughter had arranged all this behind her back.      PHYSICAL EXAMINATION:  There is no exam today, as this is a telephone followup.      ASSESSMENT:  Dementia with behavior issues, depression, anxiety and oppositional tendencies.      DISCUSSION:  This patient had a followup visit for the above reasons.  Gracie thinks there is room for improvement in terms of her mood and behavior; therefore, I am going to give a new prescription for Depakote 500 mg extended release daily as a single tablet.  Of note, she did have a CBC and hepatic panel checked in March and those were all normal.  I advised Gracie that labs should be checked about every 6 months or so.  We are going to have a followup visit in the next 2 months, or sooner if there are problems.         IRIS MARTIN MD             D: 2020   T: 2020   MT: SIDNEY      Name:     EVA CHICAS   MRN:      [id number]        Account:      [id number]   :      1950           Visit Date:   2020      Document: [id number]       cc: Susan Calhoun MD      Again, thank you for allowing me to participate in the care of your patient.        Sincerely,        Iris Martin MD

## 2020-05-26 NOTE — PROGRESS NOTES
Visit Date:   2020      NEUROLOGY CLINIC CONSULTATION      This is a telephone followup visit because of the viral epidemic.  Call initiated time 10:30, call terminated time 10:45.        HISTORY:  The patient was on speaker phone along with her daughter, Gracie.  I had initially seen the patient in clinic in February with her son, Luis.  Gracie was in the clinic, but not in the exam room because the patient was quite agitated.  That visit concluded with a plan for her primary care to initiate therapy with Depakote.  The patient is taking 250 mg a day.  Gracie thinks it has helped her anxiety and her sadness, although there is room for improvement.  She continues to have oppositional behavior.  On Mother's Day, she was weeping and upset with her family because they had told her that her father was .  He has been  for 15 years.  The patient was in on the call today for part of the time.  She did become a little bit paranoid and was starting to get upset, thinking that her daughter had arranged all this behind her back.      PHYSICAL EXAMINATION:  There is no exam today, as this is a telephone followup.      ASSESSMENT:  Dementia with behavior issues, depression, anxiety and oppositional tendencies.      DISCUSSION:  This patient had a followup visit for the above reasons.  Gracie thinks there is room for improvement in terms of her mood and behavior; therefore, I am going to give a new prescription for Depakote 500 mg extended release daily as a single tablet.  Of note, she did have a CBC and hepatic panel checked in March and those were all normal.  I advised Gracie that labs should be checked about every 6 months or so.  We are going to have a followup visit in the next 2 months, or sooner if there are problems.         IRIS MARTIN MD             D: 2020   T: 2020   MT: SIDNEY      Name:     EVA CHICAS   MRN:      [id number]        Account:      [id number]   :       1950           Visit Date:   05/26/2020      Document: [id number]       cc: Susan Calhoun MD

## 2020-07-02 ENCOUNTER — VIRTUAL VISIT (OUTPATIENT)
Dept: NEUROLOGY | Facility: CLINIC | Age: 70
End: 2020-07-02
Payer: COMMERCIAL

## 2020-07-02 DIAGNOSIS — G30.1 LATE ONSET ALZHEIMER'S DISEASE WITH BEHAVIORAL DISTURBANCE (H): ICD-10-CM

## 2020-07-02 DIAGNOSIS — F02.818 LATE ONSET ALZHEIMER'S DISEASE WITH BEHAVIORAL DISTURBANCE (H): ICD-10-CM

## 2020-07-02 PROCEDURE — 99212 OFFICE O/P EST SF 10 MIN: CPT | Mod: 95 | Performed by: PSYCHIATRY & NEUROLOGY

## 2020-07-02 RX ORDER — DIVALPROEX SODIUM 500 MG/1
500 TABLET, DELAYED RELEASE ORAL DAILY
Qty: 90 TABLET | Refills: 3 | Status: SHIPPED | OUTPATIENT
Start: 2020-07-02 | End: 2020-08-27

## 2020-07-02 NOTE — PROGRESS NOTES
Visit Date:   2020      This is a telephone followup visit.  Call initiated time was 10:57; call terminated time was .  The phone call was handled by the patient's daughter, Gracie.        SUBJECTIVE:  This was a followup visit on this patient with dementia and behavior problems.  I initially saw her in clinic in 2020.  It was a challenging visit because the patient kept trying to leave and did not want to be in the clinic.  She has high anxiety and also depression and oppositional behavior.  She was started on Depakote.  In late 2020, we increased the dose of the Depakote to 500 mg extended release daily.  Gracie indicates that overall the patient is doing well.  She seems to have stabilized.  She seems to tolerate the Depakote without difficulty.  It may make her a little sleepy in the afternoon.  Her emotions and behavior are on an even keel.  She is feeling better and is overall more settled.  Her sleep is improved.  She has a longstanding history of anxiety and depression.  She is getting out a little bit on visits to family.  She does not really exercise much as her dementia is advanced.      There is no exam as this is a telephone visit.      ASSESSMENT:  Dementia with anxiety and behavioral problems.      DISCUSSION:  This patient is seen in followup with the above issue.  She is doing better on a modest dose of Depakote 500 mg extended release in the evening.  I am not going to make any changes to the regimen.  She seems to have stabilized.  I have issued a prescription for 1-year supply.  She should have labs checked at her next followup visit including a CBC and a comprehensive metabolic panel.  She should followup with Neurology Clinic in 6-12 months or sooner if there are problems.         IRIS MARTIN MD             D: 2020   T: 2020   MT:       Name:     EVA CHICAS   MRN:      [id number]        Account:      [id number]   :      1950            Visit Date:   07/02/2020      Document: [id number]

## 2020-07-02 NOTE — PROGRESS NOTES
"Shari Graham is a 69 year old female who is being evaluated via a billable telephone visit.      The patient has been notified of following:     \"This telephone visit will be conducted via a call between you and your physician/provider. We have found that certain health care needs can be provided without the need for a physical exam.  This service lets us provide the care you need with a short phone conversation.  If a prescription is necessary we can send it directly to your pharmacy.  If lab work is needed we can place an order for that and you can then stop by our lab to have the test done at a later time.    Telephone visits are billed at different rates depending on your insurance coverage. During this emergency period, for some insurers they may be billed the same as an in-person visit.  Please reach out to your insurance provider with any questions.    If during the course of the call the physician/provider feels a telephone visit is not appropriate, you will not be charged for this service.\"    Patient has given verbal consent for Telephone visit?  Yes    What phone number would you like to be contacted at? [phone]    How would you like to obtain your AVS? MyChart        "

## 2020-07-02 NOTE — LETTER
"    7/2/2020         RE: Shari Graham  [address] [address]        Dear Colleague,    Thank you for referring your patient, Shari Graham, to the [facility]. Please see a copy of my visit note below.    Shari Graham is a 69 year old female who is being evaluated via a billable telephone visit.      The patient has been notified of following:     \"This telephone visit will be conducted via a call between you and your physician/provider. We have found that certain health care needs can be provided without the need for a physical exam.  This service lets us provide the care you need with a short phone conversation.  If a prescription is necessary we can send it directly to your pharmacy.  If lab work is needed we can place an order for that and you can then stop by our lab to have the test done at a later time.    Telephone visits are billed at different rates depending on your insurance coverage. During this emergency period, for some insurers they may be billed the same as an in-person visit.  Please reach out to your insurance provider with any questions.    If during the course of the call the physician/provider feels a telephone visit is not appropriate, you will not be charged for this service.\"    Patient has given verbal consent for Telephone visit?  Yes    What phone number would you like to be contacted at? [phone]    How would you like to obtain your AVS? [facility]          Visit Date:   07/02/2020      This is a telephone followup visit.  Call initiated time was 10:57; call terminated time was 11/07.  The phone call was handled by the patient's daughter, Gracie.        SUBJECTIVE:  This was a followup visit on this patient with dementia and behavior problems.  I initially saw her in clinic in 02/2020.  It was a challenging visit because the patient kept trying to leave and did not want to be in the clinic.  She has high anxiety and also " depression and oppositional behavior.  She was started on Depakote.  In late 2020, we increased the dose of the Depakote to 500 mg extended release daily.  Gracie indicates that overall the patient is doing well.  She seems to have stabilized.  She seems to tolerate the Depakote without difficulty.  It may make her a little sleepy in the afternoon.  Her emotions and behavior are on an even keel.  She is feeling better and is overall more settled.  Her sleep is improved.  She has a longstanding history of anxiety and depression.  She is getting out a little bit on visits to family.  She does not really exercise much as her dementia is advanced.      There is no exam as this is a telephone visit.      ASSESSMENT:  Dementia with anxiety and behavioral problems.      DISCUSSION:  This patient is seen in followup with the above issue.  She is doing better on a modest dose of Depakote 500 mg extended release in the evening.  I am not going to make any changes to the regimen.  She seems to have stabilized.  I have issued a prescription for 1-year supply.  She should have labs checked at her next followup visit including a CBC and a comprehensive metabolic panel.  She should followup with Neurology Clinic in 6-12 months or sooner if there are problems.         IRIS MARCIAL MD             D: 2020   T: 2020   MT:       Name:     EVA CHICAS   MRN:      [id number]        Account:      [id number]   :      1950           Visit Date:   2020      Document: [id number]       Again, thank you for allowing me to participate in the care of your patient.        Sincerely,        Iris Marcial MD

## 2020-08-12 ENCOUNTER — TELEPHONE (OUTPATIENT)
Dept: FAMILY MEDICINE | Facility: CLINIC | Age: 70
End: 2020-08-12

## 2020-08-12 NOTE — TELEPHONE ENCOUNTER
Patient/family was instructed to return call to [facility], directly on the RN Call back line at [phone].  In message RN advised scheduling a video visit for patient to review skin picking and request for home care orders with a provider.    Cheri Fuller RN

## 2020-08-12 NOTE — TELEPHONE ENCOUNTER
Reason for call:  Patient reporting a symptom    Symptom or request: Patient has dementia and she is picking at her skin every where until it bleeds.      Duration (how long have symptoms been present): Daughter said its been going on for awhile    Have you been treated for this before? No    Additional comments: Daughter would like orders for a home health aid to come in     Phone Number patient can be reached at:  Other phone number:  [phone], please call Gracie daughter    Best Time:  Any time    Can we leave a detailed message on this number:  YES     Thank you!    Millie ENGLAND  geri [facility] Referral Rep      Call taken on 8/12/2020 at 8:40 AM by Millie Tellez

## 2020-08-13 DIAGNOSIS — F41.9 ANXIETY: ICD-10-CM

## 2020-08-13 NOTE — TELEPHONE ENCOUNTER
I don't see consent to communicate on file, appears family has been advised to do this at a clinic visit.    I called and spoke to Gracie, she says with her mom's dementia, hard to do any kind of visits and wonders if a derm referral could be done.   I advised video or in person visit will be needed to potentially qualify for home care services anyhow so scheduled video visit tomorrow AM.    Yanna Peña RN  [facility]

## 2020-08-14 ENCOUNTER — VIRTUAL VISIT (OUTPATIENT)
Dept: FAMILY MEDICINE | Facility: CLINIC | Age: 70
End: 2020-08-14
Payer: COMMERCIAL

## 2020-08-14 DIAGNOSIS — F02.818 LATE ONSET ALZHEIMER'S DISEASE WITH BEHAVIORAL DISTURBANCE (H): ICD-10-CM

## 2020-08-14 DIAGNOSIS — T07.XXXA EXCORIATION OF MULTIPLE SITES: Primary | ICD-10-CM

## 2020-08-14 DIAGNOSIS — G30.1 LATE ONSET ALZHEIMER'S DISEASE WITH BEHAVIORAL DISTURBANCE (H): ICD-10-CM

## 2020-08-14 PROBLEM — R41.3 MEMORY CHANGES: Status: RESOLVED | Noted: 2018-02-23 | Resolved: 2020-08-14

## 2020-08-14 PROCEDURE — 99214 OFFICE O/P EST MOD 30 MIN: CPT | Mod: 95 | Performed by: FAMILY MEDICINE

## 2020-08-14 RX ORDER — ESCITALOPRAM OXALATE 20 MG/1
TABLET ORAL
Qty: 90 TABLET | Refills: 1 | Status: SHIPPED | OUTPATIENT
Start: 2020-08-14 | End: 2020-09-02

## 2020-08-14 NOTE — PROGRESS NOTES
"Shari Graham is a 69 year old female who is being evaluated via a billable video visit.      The patient has been notified of following:     \"This video visit will be conducted via a call between you and your physician/provider. We have found that certain health care needs can be provided without the need for an in-person physical exam.  This service lets us provide the care you need with a video conversation.  If a prescription is necessary we can send it directly to your pharmacy.  If lab work is needed we can place an order for that and you can then stop by our lab to have the test done at a later time.    Video visits are billed at different rates depending on your insurance coverage.  Please reach out to your insurance provider with any questions.    If during the course of the call the physician/provider feels a video visit is not appropriate, you will not be charged for this service.\"    Patient has given verbal consent for Video visit? Yes  How would you like to obtain your AVS? MyChart  If you are dropped from the video visit, the video invite should be resent to: Send to e-mail at: [email or web address]  Will anyone else be joining your video visit? Yes: daughter and . How would they like to receive their invitation? Other e-mail: within home    Subjective     Shari Graham is a 69 year old female who presents today via video visit for the following health issues:    HPI    Patient and family would like to discuss skin problems and home care. Per daughter, patient picks at her skin which creates open wounds.    Milo Taveras CMA on 8/14/2020 at 8:07 AM    Soraya cannot provide history due to dementia.   History provided by daughter Gracie.    Soraya has excoriations on her arms, upper back and chest.  Tends to pick at these areas.  They have been using some neosporin from time to time, and tries to use bandaids.  Have noticed that if they give her something to hold she doesn't pick as " much.    Wondered about her skin and if it is so fragile because she doesn't drink a lot of fluids.    Video Start Time: 8:20 am        Reviewed and updated as needed this visit by Provider         Review of Systems   Constitutional, HEENT, cardiovascular, pulmonary, gi and gu systems are negative, except as otherwise noted.      Objective           Vitals:  No vitals were obtained today due to virtual visit.    Physical Exam     GENERAL: Healthy, alert and no distress  EYES: Eyes grossly normal to inspection.  No discharge or erythema, or obvious scleral/conjunctival abnormalities.  RESP: No audible wheeze, cough, or visible cyanosis.  No visible retractions or increased work of breathing.    SKIN: multiple excoriations on arms, chest seen.  No evidence of skin infection  NEURO: Normal strength and tone and mentation evaluation limited by dementia  PSYCH: affect flat      Diagnostic Test Results:  none         Assessment & Plan     (T07.XXXA) Excoriation of multiple sites  (primary encounter diagnosis)  Comment: self induced  Plan: HOME CARE NURSING REFERRAL, CANCELED: HOME CARE        NURSING REFERRAL        Discussed use of neosporin or vaseline to help with healing and monitoring for signs of skin infection.  Discussed distraction techniques with fidget toys/squeeze balls.  Consider clothing that covers the areas, perhaps slightly more tight fitting, but still comfortable.  Keep nails short.    Discussed use of straw to help with fluid intake and promote healthy skin    (G30.1,  F02.81) Late onset Alzheimer's disease with behavioral disturbance (H)  Comment:  now open to having help from home care  Plan: **CBC with platelets FUTURE anytime,         **Comprehensive metabolic panel FUTURE anytime,        HOME CARE NURSING REFERRAL, CANCELED: HOME CARE        NURSING REFERRAL        Would benefit from nursing support to assist with skin and healing.  And would also benefit from bathing support.  Is due for  "lab work as well, as recommended by neurology       BMI:   Estimated body mass index is 25.38 kg/m  as calculated from the following:    Height as of 2/27/20: 1.588 m (5' 2.5\").    Weight as of 2/27/20: 64 kg (141 lb).               No follow-ups on file.    Susan Isabel MD  [facility]      Video-Visit Details    Type of service:  Video Visit    Video End Time:8:29 am    Originating Location (pt. Location): Home    Distant Location (provider location):  [facility]     Platform used for Video Visit: Elizabeth    "

## 2020-08-17 ENCOUNTER — TELEPHONE (OUTPATIENT)
Dept: FAMILY MEDICINE | Facility: CLINIC | Age: 70
End: 2020-08-17

## 2020-08-17 NOTE — TELEPHONE ENCOUNTER
Reason for Call:  Other verbal order    Detailed comments: Lana with [facility] needed verbal order SN 1x a week for 6 weeks 3prns   HHA 2x a week for 6 week     Phone Number Patient can be reached at: Other phone number:  [phone]    Best Time: anytime    Can we leave a detailed message on this number? YES    Call taken on 8/17/2020 at 9:55 AM by Nhung Simpson

## 2020-08-17 NOTE — TELEPHONE ENCOUNTER
I see home care nursing referral approved by Dr. Calhoun 8/14/20.    Approved requested home care orders as requested per [facility] standing orders for Home Care, Assisted Living or Nursing Home Evaluations and Treatments.     I called and spoke to Lana, advised her of this.  She says they will be seeing Shari for a lab draw tomorrow as well as ordered.    Yanna Peña RN  [facility]

## 2020-08-18 ENCOUNTER — MEDICAL CORRESPONDENCE (OUTPATIENT)
Dept: HEALTH INFORMATION MANAGEMENT | Facility: CLINIC | Age: 70
End: 2020-08-18

## 2020-08-18 LAB
ALBUMIN SERPL-MCNC: 3.1 G/DL (ref 3.4–5)
ALP SERPL-CCNC: 66 U/L (ref 40–150)
ALT SERPL W P-5'-P-CCNC: 12 U/L (ref 0–50)
ANION GAP SERPL CALCULATED.3IONS-SCNC: 8 MMOL/L (ref 3–14)
AST SERPL W P-5'-P-CCNC: 11 U/L (ref 0–45)
BILIRUB SERPL-MCNC: 0.3 MG/DL (ref 0.2–1.3)
BUN SERPL-MCNC: 16 MG/DL (ref 7–30)
CALCIUM SERPL-MCNC: 8.7 MG/DL (ref 8.5–10.1)
CHLORIDE SERPL-SCNC: 108 MMOL/L (ref 94–109)
CO2 SERPL-SCNC: 27 MMOL/L (ref 20–32)
CREAT SERPL-MCNC: 0.74 MG/DL (ref 0.52–1.04)
ERYTHROCYTE [DISTWIDTH] IN BLOOD BY AUTOMATED COUNT: 12.7 % (ref 10–15)
GFR SERPL CREATININE-BSD FRML MDRD: 81 ML/MIN/{1.73_M2}
GLUCOSE SERPL-MCNC: 91 MG/DL (ref 70–99)
HCT VFR BLD AUTO: 42.2 % (ref 35–47)
HGB BLD-MCNC: 13.7 G/DL (ref 11.7–15.7)
MCH RBC QN AUTO: 30.4 PG (ref 26.5–33)
MCHC RBC AUTO-ENTMCNC: 32.5 G/DL (ref 31.5–36.5)
MCV RBC AUTO: 94 FL (ref 78–100)
PLATELET # BLD AUTO: 199 10E9/L (ref 150–450)
POTASSIUM SERPL-SCNC: 4.1 MMOL/L (ref 3.4–5.3)
PROT SERPL-MCNC: 7 G/DL (ref 6.8–8.8)
RBC # BLD AUTO: 4.51 10E12/L (ref 3.8–5.2)
SODIUM SERPL-SCNC: 143 MMOL/L (ref 133–144)
WBC # BLD AUTO: 6.1 10E9/L (ref 4–11)

## 2020-08-18 PROCEDURE — 80053 COMPREHEN METABOLIC PANEL: CPT | Performed by: FAMILY MEDICINE

## 2020-08-18 PROCEDURE — 85027 COMPLETE CBC AUTOMATED: CPT | Performed by: FAMILY MEDICINE

## 2020-08-20 ENCOUNTER — HOSPITAL ENCOUNTER (EMERGENCY)
Facility: CLINIC | Age: 70
Discharge: HOME | End: 2020-08-20
Attending: EMERGENCY MEDICINE | Admitting: EMERGENCY MEDICINE
Payer: COMMERCIAL

## 2020-08-20 ENCOUNTER — TELEPHONE (OUTPATIENT)
Dept: FAMILY MEDICINE | Facility: CLINIC | Age: 70
End: 2020-08-20

## 2020-08-20 VITALS
OXYGEN SATURATION: 97 % | HEART RATE: 85 BPM | TEMPERATURE: 98.2 F | SYSTOLIC BLOOD PRESSURE: 178 MMHG | RESPIRATION RATE: 16 BRPM | DIASTOLIC BLOOD PRESSURE: 92 MMHG

## 2020-08-20 DIAGNOSIS — F41.1 GENERALIZED ANXIETY DISORDER: ICD-10-CM

## 2020-08-20 DIAGNOSIS — F02.818 DEMENTIA IN CONDITIONS CLASSIFIED ELSEWHERE WITH AGGRESSIVE BEHAVIOR (H): ICD-10-CM

## 2020-08-20 DIAGNOSIS — F03.91 DEMENTIA WITH BEHAVIORAL DISTURBANCE, UNSPECIFIED DEMENTIA TYPE: ICD-10-CM

## 2020-08-20 LAB
ALBUMIN UR-MCNC: 10 MG/DL
APPEARANCE UR: CLEAR
BILIRUB UR QL STRIP: NEGATIVE
COLOR UR AUTO: ABNORMAL
GLUCOSE UR STRIP-MCNC: NEGATIVE MG/DL
HGB UR QL STRIP: ABNORMAL
KETONES UR STRIP-MCNC: 40 MG/DL
LEUKOCYTE ESTERASE UR QL STRIP: ABNORMAL
MUCOUS THREADS #/AREA URNS LPF: PRESENT /LPF
NITRATE UR QL: NEGATIVE
PH UR STRIP: 6 PH (ref 5–7)
RBC #/AREA URNS AUTO: 14 /HPF (ref 0–2)
SOURCE: ABNORMAL
SP GR UR STRIP: 1.02 (ref 1–1.03)
SQUAMOUS #/AREA URNS AUTO: 6 /HPF (ref 0–1)
UROBILINOGEN UR STRIP-MCNC: NORMAL MG/DL (ref 0–2)
WBC #/AREA URNS AUTO: 29 /HPF (ref 0–5)

## 2020-08-20 PROCEDURE — 99285 EMERGENCY DEPT VISIT HI MDM: CPT | Mod: 25

## 2020-08-20 PROCEDURE — 81001 URINALYSIS AUTO W/SCOPE: CPT | Performed by: EMERGENCY MEDICINE

## 2020-08-20 PROCEDURE — 90791 PSYCH DIAGNOSTIC EVALUATION: CPT

## 2020-08-20 PROCEDURE — 99284 EMERGENCY DEPT VISIT MOD MDM: CPT | Mod: Z6 | Performed by: EMERGENCY MEDICINE

## 2020-08-20 PROCEDURE — 87086 URINE CULTURE/COLONY COUNT: CPT | Mod: GZ | Performed by: EMERGENCY MEDICINE

## 2020-08-20 RX ORDER — UBIDECARENONE 75 MG
100 CAPSULE ORAL DAILY
COMMUNITY

## 2020-08-20 RX ORDER — QUETIAPINE FUMARATE 25 MG/1
TABLET, FILM COATED ORAL
Qty: 60 TABLET | Refills: 0 | Status: SHIPPED | OUTPATIENT
Start: 2020-08-20 | End: 2020-08-27

## 2020-08-20 NOTE — TELEPHONE ENCOUNTER
Called Gracie back regarding below message.    She will call the neurologist to see if they are able to do anything for her mother.  She is considering bringing her into to the [facility] ED. Gracie will call back to RN line, number given, and leave a detailed message with the plans going forward.  She did ask that if she were to bring her mother to the ED that we call and alert the ED  that her mother is having increased behavorial outburst and she is inconsolable with the hopes she can be seen right away instead of waiting for a long period of time.      Chani Maza, RN

## 2020-08-20 NOTE — ED PROVIDER NOTES
[facility] EMERGENCY DEPARTMENT ([facility])    20        History     Chief Complaint   Patient presents with     Dementia     HPI  Shari Graham is a 69 year old female with a past medical history significant for hypertension, hyperlipidemia, and late onset Alzheimer's with behavioral disturbance who presents to the Emergency Department for increased agitation and acting out at home.  Patient lives with her  and is currently in the process of establishing some home care to help around the house.  Patient presents with the daughter who is a .  Patient denies any chest pain or shortness of breath denies any fevers vomiting diarrhea or UTI symptoms.    I have reviewed the Medications, Allergies, Past Medical and Surgical History, and Social History in the [employer] system.    PAST MEDICAL HISTORY:   Past Medical History:   Diagnosis Date     Hypertension      Mixed hyperlipidemia     Hyperlipidemia       PAST SURGICAL HISTORY:   Past Surgical History:   Procedure Laterality Date     APPENDECTOMY       COLONOSCOPY       HYSTERECTOMY, PAP NO LONGER INDICATED      for reasons of fibroids     SURGICAL HISTORY OF -   grade school    neck cyst removed     SURGICAL HISTORY OF 2011    right cyst removed on wrist       Past medical history, past surgical history, medications, and allergies were reviewed with the patient. Additional pertinent items: None    FAMILY HISTORY:   Family History   Problem Relation Age of Onset     Heart Disease Mother      Diabetes Mother              Hypertension Mother      Cancer Father      Respiratory Father      Prostate Cancer Father              Alzheimer Disease Paternal Grandmother      Prostate Cancer Paternal Grandfather      Prostate Cancer Brother      Breast Cancer Sister      Diabetes Maternal Grandmother              Breast Cancer Sister      Prostate Cancer Brother        SOCIAL HISTORY:   Social History     Tobacco  Use     Smoking status: Former Smoker     Packs/day: 0.50     Years: 11.00     Pack years: 5.50     Types: Cigarettes     Last attempt to quit: 1981     Years since quittin.0     Smokeless tobacco: Never Used   Substance Use Topics     Alcohol use: Yes     Comment: 2/wk     Social history was reviewed with the patient. Additional pertinent items: None      Dose / Directions   BIOTIN      Refills:  0     CALCIUM CARBONATE      Refills:  0     divalproex sodium delayed-release 500 MG DR tablet  Commonly known as:  DEPAKOTE  Used for:  Late onset Alzheimer's disease with behavioral disturbance (H)      Dose:  500 mg  Take 1 tablet (500 mg) by mouth daily  Quantity:  90 tablet  Refills:  3     escitalopram 20 MG tablet  Commonly known as:  LEXAPRO  Used for:  Anxiety      TAKE ONE TABLET BY MOUTH ONCE DAILY  Quantity:  90 tablet  Refills:  1     FISH OIL      Refills:  0     FLAXSEED      Refills:  0     Glucosamine Chond Complex/MSM Tabs      Refills:  0     metoprolol succinate ER 50 MG 24 hr tablet  Commonly known as:  TOPROL-XL  Used for:  HTN, goal below 140/90      Dose:  50 mg  Take 1 tablet (50 mg) by mouth daily  Quantity:  90 tablet  Refills:  3     MULTIPLE VITAMIN PO      Refills:  0     simvastatin 10 MG tablet  Commonly known as:  ZOCOR  Used for:  Hyperlipidemia LDL goal <130      TAKE ONE TABLET BY MOUTH AT BEDTIME  Quantity:  90 tablet  Refills:  3     traZODone 50 MG tablet  Commonly known as:  DESYREL  Used for:  Anxiety, Insomnia, unspecified type      Dose:  25 mg  Take 0.5 tablets (25 mg) by mouth At Bedtime TAKE ONE-HALF TO ONE TABLET BY MOUTH EVERY NIGHT AT BEDTIME AS NEEDED FOR SLEEP  Quantity:  45 tablet  Refills:  0     VITAMIN D PO      Refills:  0     VITAMIN E      Refills:  0        CONTINUE these medicines which have NOT CHANGED      Dose / Directions   order for DME  Used for:  Thumb pain, right      Equipment being ordered: right wrist brace with thumb support  Quantity:  1  Device  Refills:  0             No Known Allergies     Review of Systems  A complete review of systems was performed with pertinent positives and negatives noted in the HPI, and all other systems negative.    Physical Exam   BP: (!) 178/92  Pulse: 85  Temp: 98.2  F (36.8  C)  Resp: 16  SpO2: 97 %      Physical Exam  Vitals signs and nursing note reviewed.   Constitutional:       Comments: Elderly conversant   HENT:      Head: Atraumatic.   Eyes:      Extraocular Movements: Extraocular movements intact.      Pupils: Pupils are equal, round, and reactive to light.   Neck:      Musculoskeletal: Neck supple.   Cardiovascular:      Heart sounds: Normal heart sounds.   Pulmonary:      Breath sounds: Normal breath sounds.   Abdominal:      Palpations: Abdomen is soft.      Tenderness: There is no abdominal tenderness.   Musculoskeletal:         General: No deformity.   Skin:     General: Skin is warm.   Neurological:      General: No focal deficit present.      Mental Status: She is alert.      Comments: Patient is grossly symmetric with strength bilaterally   Psychiatric:         Mood and Affect: Mood normal.         ED Course        Procedures          Blood work was done 2 days ago and was reviewed.    Urine was obtained here to rule out UTI.    Results for orders placed or performed during the hospital encounter of 08/20/20 (from the past 24 hour(s))   UA with Microscopic reflex to Culture    Specimen: Urine clean catch; Midstream Urine   Result Value Ref Range    Color Urine Light Red     Appearance Urine Clear     Glucose Urine Negative NEG^Negative mg/dL    Bilirubin Urine Negative NEG^Negative    Ketones Urine 40 (A) NEG^Negative mg/dL    Specific Gravity Urine 1.021 1.003 - 1.035    Blood Urine Moderate (A) NEG^Negative    pH Urine 6.0 5.0 - 7.0 pH    Protein Albumin Urine 10 (A) NEG^Negative mg/dL    Urobilinogen mg/dL Normal 0.0 - 2.0 mg/dL    Nitrite Urine Negative NEG^Negative    Leukocyte Esterase Urine Large  (A) NEG^Negative    Source Midstream Urine     WBC Urine 29 (H) 0 - 5 /HPF    RBC Urine 14 (H) 0 - 2 /HPF    Squamous Epithelial /HPF Urine 6 (H) 0 - 1 /HPF    Mucous Urine Present (A) NEG^Negative /LPF       Urine culture pending    Assessments & Plan (with Medical Decision Making)     I have reviewed the nursing notes.    Patient was seen by behavioral medicine and I discussed the case with them.  They suggested some additional medication to help the patient with her agitation and to proceed with home care plans.    I have reviewed the findings, diagnosis, and plan with the patient's daughter.    New Prescriptions    QUETIAPINE (SEROQUEL) 25 MG TABLET    Take 1 tablet (25mg) at bedtime every day.  In addition you may take a half a tablet (12.5mg) once during the day as needed for agitation.       Final diagnoses:   Dementia with behavioral disturbance, unspecified dementia type (H)     Check your urine culture results in 2 days and seek treatment if positive.    Follow-up with home care as discussed.    Please make an appointment to follow up with Your Primary Care Provider next week next week for recheck.    Priyank José MD, MD    8/20/2020   [facility] [place] EMERGENCY DEPARTMENT     Priyank José MD  08/20/20 [id number]

## 2020-08-20 NOTE — TELEPHONE ENCOUNTER
Called Dione and gave update.  Seen afterwards that patient was already seen in ED.    Called and spoke to Gracie.  She says her mom is calmer now, they have a new medication to try, Seroquel 25 mg HS.  They will call back if they have any further concerns.      Chani Maza RN

## 2020-08-20 NOTE — TELEPHONE ENCOUNTER
Reviewed. She has a neurologist that she's been seeing for behavioral regulation and alzheimer's related challenges, so they can certainly call them if they want guidance.     That being said, without an evaluation it is risky to just prescribe some kind of sedating medication that could cause falls or injury. I would recommend an eval in the ER because to me she sounds high risk for harming herself. A UTI could certainly be the culprit but with agitation this acute-other causes like a fall/head injury or perhaps a stroke etc.    Recommend ER - but they can certainly review with their neurologist as well.    Sergio Ford, MPAS, PA-C

## 2020-08-20 NOTE — ED AVS SNAPSHOT
[facility], [place], Emergency Department  [address]  [facility] [address]  Phone:  [phone]  Fax:  [phone]                                    Shari Graham   MRN: [id number]    Department:  [facility], Emergency Department   Date of Visit:  8/20/2020           After Visit Summary Signature Page    I have received my discharge instructions, and my questions have been answered. I have discussed any challenges I see with this plan with the nurse or doctor.    ..........................................................................................................................................  Patient/Patient Representative Signature      ..........................................................................................................................................  Patient Representative Print Name and Relationship to Patient    ..................................................               ................................................  Date                                   Time    ..........................................................................................................................................  Reviewed by Signature/Title    ...................................................              ..............................................  Date                                               Time          22EPIC Rev 08/18

## 2020-08-20 NOTE — TELEPHONE ENCOUNTER
Daughter is calling because her mom is really distraught.  Wants to go home unable to redirect.    rGacie [phone]

## 2020-08-20 NOTE — DISCHARGE INSTRUCTIONS
Check your urine culture results in 2 days and seek treatment if positive.    Follow-up with home care as discussed.    Please make an appointment to follow up with Your Primary Care Provider next week next week for recheck.

## 2020-08-20 NOTE — TELEPHONE ENCOUNTER
Red flag call taken.  Gracie is at her home, says patient has been distraught and inconsolable for the past 2 days.   She lives at home with .   Gets some home nursing visits and will be seen tomorrow but they declined HHA.    Patient had blood drawn per home care on 8/18 and had virtual visit with Dr. Calhoun 8/14.  She had been picking at her skin.    Labs normal.   Gracie says Soraya liked the nurse, did fine with the blood draw but started being more restless that evening, yesterday and today is pacing, not eating well, walked out of the house while her /caretaker was in the bathroom.    They are trying to play music, gave her a bath, she is still pacing and says she wants to go home.      Gracie says she does not think they can bring patient in for a clinic visit.   I advised if they fear she will hurt herself or someone else, then call 911 for assistance.   May need ER evaluation if rapid increase in behavior or violent.     Pharmacy selected, Gracie wonders if a mild anti-anxiety med could be tried?   I also suggested we may want to rule out a UTI, although the CBC was normal on 8/18.    Gracie will monitor and seek 911 assistance if needed.    Routed to Dr. Calhoun and Gian Ford (not sure if Dr. Calhoun looking at calls today) to address.    Yanna Peña RN  [facility]

## 2020-08-20 NOTE — ED TRIAGE NOTES
Over the past 3 days, change in behavior asking to go home when she lives at home with . Refusing to eat and drink. Tearful and asking for parents that are dead. Presents with daughter in room. Patient A/Ox1

## 2020-08-21 LAB
BACTERIA SPEC CULT: NORMAL
Lab: NORMAL
SPECIMEN SOURCE: NORMAL

## 2020-08-21 NOTE — RESULT ENCOUNTER NOTE
Final urine culture report is NEGATIVE per [place] ED Lab Result protocol.    If NEGATIVE result, no change in treatment, per [place] ED Lab Result protocol.

## 2020-08-24 ENCOUNTER — COMMUNICATION - HEALTHEAST (OUTPATIENT)
Dept: SCHEDULING | Facility: CLINIC | Age: 70
End: 2020-08-24

## 2020-08-24 ENCOUNTER — RECORDS - HEALTHEAST (OUTPATIENT)
Dept: LAB | Facility: CLINIC | Age: 70
End: 2020-08-24

## 2020-08-25 NOTE — PROGRESS NOTES
"[place] [facility]  PRIMARY CARE PROVIDER AND CLINIC:  Susan Isabel MD, [address] [facility] / [facility] [address]  Chief Complaint   Patient presents with     Establish Care     [place] Medical Record Number:  [id number]  Place of Service where encounter took place:  [facility] ([facility]) [[id number]]    Shari Graham  is a 69 year old  (1950), admitted to the above facility from  [facility]. Hospital stay 8/22/20 through 8/24/20..  Admitted to this facility for  medical management and nursing care.    HPI:    HPI information obtained from: facility chart records, facility staff, patient report and [facility] chart review.   Brief Summary of Hospital Course:   Patient is a 69 year old woman with PMH including progressive Alzheimer's Dementia, HTN, HLD who has been seen at [facility] ED on 8/20/20 and [facility] ED/Hospital 8/22-24 for increased agitation, confusion.  Head CT was neg, no evidence of pyuria, CHEST XRAY clear, no metabolic etiology found.  It was felt her altered mental status was 2/2 progressing dementia and she was moved to [facility] memory care unit for increased care needs.      Updates on Status Since Skilled nursing Admission:   Today patient is met today in the memory care unit of the SNF where she is very anxious, worried about \"being here and trying it out and the money.\"  It appears she believes she is at \"school\" or a \"camp\" or something.  She notes her parents were just here and told her it was a very expensive stay and while she's willing to \"give it a try\" she is concerned about the money.  I reassured her frequently about the money but unfortunately this reassurance was short-lived for her.  She was very anxious.  Nursing reports patient is very confused, wandering, sometimes wandering into other guests' rooms, difficult to redirect and weepy.  Yesterday nursing reported patient as very difficult to manage.  Nursing also reports patient has had " "2 falls since admission already; she does not use a walker.   Patient reports she has no pain, SOB, CP, constipation.  She reports she is \"scared of everything\" a number of times.      CODE STATUS/ADVANCE DIRECTIVES DISCUSSION:   DNR / DNI  Patient's living condition: lives in a skilled nursing facility  ALLERGIES: Patient has no known allergies.  PAST MEDICAL HISTORY:  has a past medical history of Hypertension and Mixed hyperlipidemia. She also has no past medical history of Arthritis, Cancer (H), Cerebral infarction (H), Congestive heart failure (H), Congestive heart failure, unspecified, COPD (chronic obstructive pulmonary disease) (H), Depressive disorder, Diabetes (H), Heart disease, History of blood transfusion, Thyroid disease, or Uncomplicated asthma.  PAST SURGICAL HISTORY:   has a past surgical history that includes surgical history of -  (grade school); hysterectomy, pap no longer indicated (1995); surgical history of -  (2011); appendectomy; and colonoscopy.  FAMILY HISTORY: family history includes Alzheimer Disease in her paternal grandmother; Breast Cancer in her sister and sister; Cancer in her father; Diabetes in her maternal grandmother and mother; Heart Disease in her mother; Hypertension in her mother; Prostate Cancer in her brother, brother, father, and paternal grandfather; Respiratory in her father.  SOCIAL HISTORY:   reports that she quit smoking about 39 years ago. Her smoking use included cigarettes. She has a 5.50 pack-year smoking history. She has never used smokeless tobacco. She reports current alcohol use. She reports that she does not use drugs.    Post Discharge Medication Reconciliation Status: discharge medications reconciled and changed, per note/orders  Current Outpatient Medications   Medication Sig Dispense Refill     acetaminophen (TYLENOL) 325 MG tablet Take 2 tablets (650 mg) by mouth every 4 hours as needed for mild pain       divalproex sodium delayed-release (DEPAKOTE) " "250 MG DR tablet Take 1 tablet (250 mg) by mouth 3 times daily       mirtazapine (REMERON) 15 MG tablet Take 1 tablet (15 mg) by mouth At Bedtime       OLANZapine (ZYPREXA) 2.5 MG tablet Take 1 tablet (2.5 mg) by mouth 3 times daily. May also take 1 tablet (2.5 mg) nightly as needed (delusions, hallucinations, agitation).       cyanocobalamin (VITAMIN B-12) 100 MCG tablet Take 100 mcg by mouth daily       escitalopram (LEXAPRO) 20 MG tablet TAKE ONE TABLET BY MOUTH ONCE DAILY 90 tablet 1     metoprolol succinate ER (TOPROL-XL) 50 MG 24 hr tablet Take 1 tablet (50 mg) by mouth daily 90 tablet 3     simvastatin (ZOCOR) 10 MG tablet TAKE ONE TABLET BY MOUTH AT BEDTIME 90 tablet 3       ROS:  Limited secondary to cognitive impairment but today pt reports 10 point ROS of systems including Constitutional, Eyes, Respiratory, Cardiovascular, Gastroenterology, Genitourinary, Integumentary, Musculoskeletal, Psychiatric were all negative except for pertinent positives noted in my HPI.    Vitals:  BP (!) 173/94   Pulse 100   Temp 97.7  F (36.5  C)   Resp 22   Wt 69.1 kg (152 lb 6.4 oz)   SpO2 97%   BMI 27.43 kg/m    Exam:  GENERAL APPEARANCE:  Alert, in emotional distress and reports being \"scared of everything\" , confused  RESP:  respiratory effort and palpation of chest normal, auscultation of lungs clear, no respiratory distress  CV:  Palpation and auscultation of heart done , rate and rhythm regular, no murmur, scant LE peripheral edema  ABDOMEN:  normal bowel sounds, soft, nontender, no hepatosplenomegaly or other masses  M/S:   Gait and station ambulatory without assistive device, Digits and nails with arthritic changes, reduced muscle mass  SKIN:  Inspection and Palpation of skin and subcutaneous tissue with LE scattered small wounds, scabbed, no s/s of infection present   PSYCH:  insight and judgement, memory with severe impairment , affect and mood very anxious,  follows commands but forgets very quickly. " "      Lab/Diagnostic data:  Reviewed in Care Everywhere from [facility].  Last [facility] labs:  CBC RESULTS:   Recent Labs   Lab Test 08/18/20  0920 03/10/20  0926   WBC 6.1 6.9   RBC 4.51 4.58   HGB 13.7 13.7   HCT 42.2 42.5   MCV 94 93   MCH 30.4 29.9   MCHC 32.5 32.2   RDW 12.7 13.4    236       Last Basic Metabolic Panel:  Recent Labs   Lab Test 08/18/20 0920 12/06/19  1055    138   POTASSIUM 4.1 4.1   CHLORIDE 108 106   KALPESH 8.7 9.2   CO2 27 29   BUN 16 17   CR 0.74 0.73   GLC 91 102*       Liver Function Studies -   Recent Labs   Lab Test 08/18/20  0920 03/10/20  0926   PROTTOTAL 7.0 7.2   ALBUMIN 3.1* 3.6   BILITOTAL 0.3 0.4   ALKPHOS 66 72   AST 11 16   ALT 12 22       TSH   Date Value Ref Range Status   12/06/2019 2.79 0.40 - 4.00 mU/L Final   10/06/2017 3.41 0.40 - 4.00 mU/L Final   ]  5/22/20 [facility] - TSH 5.17 with normal Free T4 at 1.1       Lab Results   Component Value Date    A1C 5.5 10/04/2018    A1C 5.5 09/08/2014       ASSESSMENT/PLAN:  Late onset Alzheimer's disease with behavioral disturbance (H)  Delusions (H)   Anxiety  Prev living at home with her  but has had increase in confusion and agitation, difficult to redirect  On VItamin B12 100 mcg daily (8/23/20 - level was 843)  She is also on Depakote 250 mg TID (some records indicate 500 mg q HS instead of 250 mg q HS), escitalopram 20 mg daily, mirtazapine 15 mg q HS, olanzapine 2.5 mg TID x 2 days (complete now), seroquel 12.5 mg q HS PRN,    Nursing reports patient is very confused, anxious, wandering a lot, difficult to redirect, has delusions of her parents here and recent conversations with her sister about \"being here and the money\"  A&O x 1 (self)     PLAN:  - Continue Vitamin B12  - continue Depakote 250 mg TID and monitor for titration needs  - continue escitalopram 20 mg daily (known recommendation not >10 mg in elderly)  - continue mirtazapine  - restart/constinue olanzapine 2.5 mg TID  - discontinue Seroquel d/t " already on larger dose escitalopram and do not wish to prolong QT interval  - start olanzapine 2.5 mg q HS PRN x 14 days per regulation guidelines.   - nursing for supportive cares   - overall goal to allow patient to settle in and then GDR meds.  Family goal is comfort    Essential hypertension  Hyperlipidemia LDL goal <130  On Toprol XL 50 mg daily, simvastatin 10 mg q HS  BPs 120-130s/70-80s  HRs 70-80s  Patient denies CP; unable to comment on symptoms of HA, lightheadedness (may be a poor historian)     PLAN:  - Toprol XL 50 mg daily  - simvastatin 10 mg q HS  - BP goals are ~130 -165/60 -90 mmHg.This is higher than ACC and AHA recommendations due to risk for hypotension, risk of dizziness and falls, risk of tissue/cerebral hypoperfusion and frailty. Patient is stable with current plan of care and routine assessment.      Elevated TSH  8/22/20 TSH 5.17 but Free T4 normal at 1.1  Patient is anxious; no evidence of lethargy, constipation.  Possible depression present.     PLAN:  - Will not treat current TSH as studies have shown there is no  difference in hypothyroid symptoms or tiredness scores after one year of treatment if TSH is between the upper reference limit and 9.9 mU/L. Monitor in 1 year. (Yung DJ, Rodnazi N, Kaye PM, et al. Thyroid Hormone Therapy for Older Adults with Subclinical Hypothyroidism. N Engl J Med 2017. Treatment with levothyroxine provides no symptomatic benefit in older adults with subclinical hypothyroidism (April 2017 Up to Date)  - can recheck TSH, Free T4 in a few months for stability.     Pain  On Tylenol 650 mg q 4 hours PRN  Patient reports no pain  Nursing reports no evidence of pain     PLAN:  - continue Tylenol 650 mg q 4 hours PRN. Limit 3 gm in 24 hours.        Orders written by provider at facility  1. Olanzapine 2.5 mg TID   2. Olanzapine 2.5 mg q HS PRN x 14 days  3. discontinue Seroquel   4. Vitamin D 1000 units daily.      Electronically signed by:  Mili Fermin  APRN CNP

## 2020-08-27 ENCOUNTER — NURSING HOME VISIT (OUTPATIENT)
Dept: GERIATRICS | Facility: CLINIC | Age: 70
End: 2020-08-27
Payer: COMMERCIAL

## 2020-08-27 VITALS
WEIGHT: 152.4 LBS | BODY MASS INDEX: 27.43 KG/M2 | HEART RATE: 100 BPM | DIASTOLIC BLOOD PRESSURE: 94 MMHG | SYSTOLIC BLOOD PRESSURE: 173 MMHG | OXYGEN SATURATION: 97 % | TEMPERATURE: 97.7 F | RESPIRATION RATE: 22 BRPM

## 2020-08-27 DIAGNOSIS — R79.89 ELEVATED TSH: ICD-10-CM

## 2020-08-27 DIAGNOSIS — F02.818 LATE ONSET ALZHEIMER'S DISEASE WITH BEHAVIORAL DISTURBANCE (H): Primary | ICD-10-CM

## 2020-08-27 DIAGNOSIS — F22 DELUSIONS (H): ICD-10-CM

## 2020-08-27 DIAGNOSIS — R52 PAIN: ICD-10-CM

## 2020-08-27 DIAGNOSIS — I10 ESSENTIAL HYPERTENSION: ICD-10-CM

## 2020-08-27 DIAGNOSIS — F41.9 ANXIETY: ICD-10-CM

## 2020-08-27 DIAGNOSIS — E78.5 HYPERLIPIDEMIA LDL GOAL <130: ICD-10-CM

## 2020-08-27 DIAGNOSIS — G30.1 LATE ONSET ALZHEIMER'S DISEASE WITH BEHAVIORAL DISTURBANCE (H): Primary | ICD-10-CM

## 2020-08-27 LAB
GAMMA INTERFERON BACKGROUND BLD IA-ACNC: 0.08 IU/ML
M TB IFN-G BLD-IMP: NEGATIVE
MITOGEN IGNF BCKGRD COR BLD-ACNC: 0 IU/ML
MITOGEN IGNF BCKGRD COR BLD-ACNC: 0.02 IU/ML
QTF INTERPRETATION: NORMAL
QTF MITOGEN - NIL: 5.52 IU/ML

## 2020-08-27 PROCEDURE — 99309 SBSQ NF CARE MODERATE MDM 30: CPT | Performed by: NURSE PRACTITIONER

## 2020-08-27 RX ORDER — OLANZAPINE 2.5 MG/1
TABLET, FILM COATED ORAL
Start: 2020-08-27 | End: 2020-10-08

## 2020-08-27 RX ORDER — MIRTAZAPINE 15 MG/1
15 TABLET, FILM COATED ORAL AT BEDTIME
Start: 2020-08-27

## 2020-08-27 RX ORDER — DIVALPROEX SODIUM 250 MG/1
250 TABLET, DELAYED RELEASE ORAL 3 TIMES DAILY
Start: 2020-08-27 | End: 2021-01-04

## 2020-08-27 RX ORDER — ACETAMINOPHEN 325 MG/1
650 TABLET ORAL EVERY 4 HOURS PRN
Start: 2020-08-27 | End: 2020-08-31

## 2020-08-27 NOTE — LETTER
"    8/27/2020        RE: Shari Graham  [facility]  [address] [facility] [address]        [place] [facility]  PRIMARY CARE PROVIDER AND CLINIC:  Susan Isabel MD, [address] [facility] / [facility] [address]  Chief Complaint   Patient presents with     Establish Care     [place] Medical Record Number:  [id number]  Place of Service where encounter took place:  [facility] (SNF) [[id number]]    Shari Graham  is a 69 year old  (1950), admitted to the above facility from  [facility]. Hospital stay 8/22/20 through 8/24/20..  Admitted to this facility for  medical management and nursing care.    HPI:    HPI information obtained from: facility chart records, facility staff, patient report and [facility] chart review.   Brief Summary of Hospital Course:   Patient is a 69 year old woman with PMH including progressive Alzheimer's Dementia, HTN, HLD who has been seen at [facility] ED on 8/20/20 and [facility] ED/Hospital 8/22-24 for increased agitation, confusion.  Head CT was neg, no evidence of pyuria, CHEST XRAY clear, no metabolic etiology found.  It was felt her altered mental status was 2/2 progressing dementia and she was moved to [facility] memory care unit for increased care needs.      Updates on Status Since Skilled nursing Admission:   Today patient is met today in the memory care unit of the SNF where she is very anxious, worried about \"being here and trying it out and the money.\"  It appears she believes she is at \"school\" or a \"camp\" or something.  She notes her parents were just here and told her it was a very expensive stay and while she's willing to \"give it a try\" she is concerned about the money.  I reassured her frequently about the money but unfortunately this reassurance was short-lived for her.  She was very anxious.  Nursing reports patient is very confused, wandering, sometimes wandering into other guests' rooms, difficult to redirect and " "weepy.  Yesterday nursing reported patient as very difficult to manage.  Nursing also reports patient has had 2 falls since admission already; she does not use a walker.   Patient reports she has no pain, SOB, CP, constipation.  She reports she is \"scared of everything\" a number of times.      CODE STATUS/ADVANCE DIRECTIVES DISCUSSION:   DNR / DNI  Patient's living condition: lives in a skilled nursing facility  ALLERGIES: Patient has no known allergies.  PAST MEDICAL HISTORY:  has a past medical history of Hypertension and Mixed hyperlipidemia. She also has no past medical history of Arthritis, Cancer (H), Cerebral infarction (H), Congestive heart failure (H), Congestive heart failure, unspecified, COPD (chronic obstructive pulmonary disease) (H), Depressive disorder, Diabetes (H), Heart disease, History of blood transfusion, Thyroid disease, or Uncomplicated asthma.  PAST SURGICAL HISTORY:   has a past surgical history that includes surgical history of -  (grade school); hysterectomy, pap no longer indicated (1995); surgical history of -  (2011); appendectomy; and colonoscopy.  FAMILY HISTORY: family history includes Alzheimer Disease in her paternal grandmother; Breast Cancer in her sister and sister; Cancer in her father; Diabetes in her maternal grandmother and mother; Heart Disease in her mother; Hypertension in her mother; Prostate Cancer in her brother, brother, father, and paternal grandfather; Respiratory in her father.  SOCIAL HISTORY:   reports that she quit smoking about 39 years ago. Her smoking use included cigarettes. She has a 5.50 pack-year smoking history. She has never used smokeless tobacco. She reports current alcohol use. She reports that she does not use drugs.    Post Discharge Medication Reconciliation Status: discharge medications reconciled and changed, per note/orders  Current Outpatient Medications   Medication Sig Dispense Refill     acetaminophen (TYLENOL) 325 MG tablet Take 2 tablets " "(650 mg) by mouth every 4 hours as needed for mild pain       divalproex sodium delayed-release (DEPAKOTE) 250 MG DR tablet Take 1 tablet (250 mg) by mouth 3 times daily       mirtazapine (REMERON) 15 MG tablet Take 1 tablet (15 mg) by mouth At Bedtime       OLANZapine (ZYPREXA) 2.5 MG tablet Take 1 tablet (2.5 mg) by mouth 3 times daily. May also take 1 tablet (2.5 mg) nightly as needed (delusions, hallucinations, agitation).       cyanocobalamin (VITAMIN B-12) 100 MCG tablet Take 100 mcg by mouth daily       escitalopram (LEXAPRO) 20 MG tablet TAKE ONE TABLET BY MOUTH ONCE DAILY 90 tablet 1     metoprolol succinate ER (TOPROL-XL) 50 MG 24 hr tablet Take 1 tablet (50 mg) by mouth daily 90 tablet 3     simvastatin (ZOCOR) 10 MG tablet TAKE ONE TABLET BY MOUTH AT BEDTIME 90 tablet 3       ROS:  Limited secondary to cognitive impairment but today pt reports 10 point ROS of systems including Constitutional, Eyes, Respiratory, Cardiovascular, Gastroenterology, Genitourinary, Integumentary, Musculoskeletal, Psychiatric were all negative except for pertinent positives noted in my HPI.    Vitals:  BP (!) 173/94   Pulse 100   Temp 97.7  F (36.5  C)   Resp 22   Wt 69.1 kg (152 lb 6.4 oz)   SpO2 97%   BMI 27.43 kg/m    Exam:  GENERAL APPEARANCE:  Alert, in emotional distress and reports being \"scared of everything\" , confused  RESP:  respiratory effort and palpation of chest normal, auscultation of lungs clear, no respiratory distress  CV:  Palpation and auscultation of heart done , rate and rhythm regular, no murmur, scant LE peripheral edema  ABDOMEN:  normal bowel sounds, soft, nontender, no hepatosplenomegaly or other masses  M/S:   Gait and station ambulatory without assistive device, Digits and nails with arthritic changes, reduced muscle mass  SKIN:  Inspection and Palpation of skin and subcutaneous tissue with LE scattered small wounds, scabbed, no s/s of infection present   PSYCH:  insight and judgement, memory " "with severe impairment , affect and mood very anxious,  follows commands but forgets very quickly.       Lab/Diagnostic data:  Reviewed in Care Everywhere from [facility].  Last [facility] labs:  CBC RESULTS:   Recent Labs   Lab Test 08/18/20  0920 03/10/20  0926   WBC 6.1 6.9   RBC 4.51 4.58   HGB 13.7 13.7   HCT 42.2 42.5   MCV 94 93   MCH 30.4 29.9   MCHC 32.5 32.2   RDW 12.7 13.4    236       Last Basic Metabolic Panel:  Recent Labs   Lab Test 08/18/20 0920 12/06/19  1055    138   POTASSIUM 4.1 4.1   CHLORIDE 108 106   KALPESH 8.7 9.2   CO2 27 29   BUN 16 17   CR 0.74 0.73   GLC 91 102*       Liver Function Studies -   Recent Labs   Lab Test 08/18/20  0920 03/10/20  0926   PROTTOTAL 7.0 7.2   ALBUMIN 3.1* 3.6   BILITOTAL 0.3 0.4   ALKPHOS 66 72   AST 11 16   ALT 12 22       TSH   Date Value Ref Range Status   12/06/2019 2.79 0.40 - 4.00 mU/L Final   10/06/2017 3.41 0.40 - 4.00 mU/L Final   ]  5/22/20 [facility] - TSH 5.17 with normal Free T4 at 1.1       Lab Results   Component Value Date    A1C 5.5 10/04/2018    A1C 5.5 09/08/2014       ASSESSMENT/PLAN:  Late onset Alzheimer's disease with behavioral disturbance (H)  Anxiety  Prev living at home with her  but has had increase in confusion and agitation, difficult to redirect  On VItamin B12 100 mcg daily (8/23/20 - level was 843)  She is also on Depakote 250 mg TID (some records indicate 500 mg q HS instead of 250 mg q HS), escitalopram 20 mg daily, mirtazapine 15 mg q HS, olanzapine 2.5 mg TID x 2 days (complete now), seroquel 12.5 mg q HS PRN,    Nursing reports patient is very confused, anxious, wandering a lot, difficult to redirect, has delusions of her parents here and recent conversations with her sister about \"being here and the money\"  A&O x 1 (self)     PLAN:  - Continue Vitamin B12  - continue Depakote 250 mg TID and monitor for titration needs  - continue escitalopram 20 mg daily (known recommendation not >10 mg in elderly)  - continue " mirtazapine  - restart/constinue olanzapine 2.5 mg TID  - discontinue Seroquel d/t already on larger dose escitalopram and do not wish to prolong QT interval  - start olanzapine 2.5 mg q HS PRN x 14 days per regulation guidelines.   - nursing for supportive cares   - overall goal to allow patient to settle in and then GDR meds.  Family goal is comfort    Essential hypertension  Hyperlipidemia LDL goal <130  On Toprol XL 50 mg daily, simvastatin 10 mg q HS  BPs 120-130s/70-80s  HRs 70-80s  Patient denies CP; unable to comment on symptoms of HA, lightheadedness (may be a poor historian)     PLAN:  - Toprol XL 50 mg daily  - simvastatin 10 mg q HS  - BP goals are ~130 -165/60 -90 mmHg.This is higher than ACC and AHA recommendations due to risk for hypotension, risk of dizziness and falls, risk of tissue/cerebral hypoperfusion and frailty. Patient is stable with current plan of care and routine assessment.      Elevated TSH  8/22/20 TSH 5.17 but Free T4 normal at 1.1  Patient is anxious; no evidence of lethargy, constipation.  Possible depression present.     PLAN:  - Will not treat current TSH as studies have shown there is no  difference in hypothyroid symptoms or tiredness scores after one year of treatment if TSH is between the upper reference limit and 9.9 mU/L. Monitor in 1 year. (Yung DJ, Arnold N, Kaye PM, et al. Thyroid Hormone Therapy for Older Adults with Subclinical Hypothyroidism. N Engl J Med 2017. Treatment with levothyroxine provides no symptomatic benefit in older adults with subclinical hypothyroidism (April 2017 Up to Date)  - can recheck TSH, Free T4 in a few months for stability.     Pain  On Tylenol 650 mg q 4 hours PRN  Patient reports no pain  Nursing reports no evidence of pain     PLAN:  - continue Tylenol 650 mg q 4 hours PRN. Limit 3 gm in 24 hours.        Orders written by provider at facility  1. Olanzapine 2.5 mg TID   2. Olanzapine 2.5 mg q HS PRN x 14 days  3. discontinue Seroquel    4. Vitamin D 1000 units daily.      Electronically signed by:  AARON Aggarwal CNP                         Sincerely,        AARON Aggarwal CNP

## 2020-08-28 NOTE — PROGRESS NOTES
"[facility]  [place] Medical Record Number:  [id number]  Place of Service where encounter took place:  [facility] ([facility]) [[id number]]  Chief Complaint   Patient presents with     RECHECK       HPI:    Shari Graham  is a 69 year old (1950), who is being seen today for an episodic care visit.  HPI information obtained from: facility chart records, facility staff, patient report and [facility] chart review. Today's concern is:     Late onset Alzheimer's disease with behavioral disturbance (H)  Delusions (H)  Anxiety  Essential hypertension  Hyperlipidemia LDL goal <130  Pain  Constipation, unspecified constipation type     Patient is a 69 year old woman with PMH including progressive Alzheimer's Dementia, HTN, HLD who has been seen at [facility] ED on 8/20/20 and [facility] ED/[facility] 8/22-24 for increased agitation, confusion.  Head CT was neg, no evidence of pyuria, CHEST XRAY clear, no metabolic etiology found.  It was felt her altered mental status was 2/2 progressing dementia and she was moved to [facility] memory care unit for increased care needs.   Family's goals (per nursing) is comfort.      Recent Medication Changes:  - 8/27/20: Olanzapine 2.5 mg TID, Olanzapine 2.5 mg q HS PRN x 14 days, discontinue Seroquel , Vitamin D 1000 units daily.    Patient is met today in the memory care SNF where she is pleasantly confused.  She is tangential in speech but answers my questions, however; it is clear she is not understanding my questions.  She reports \"no\" to all my questions but is likely a very poor historian.    Nursing reports:  (Patient's ) had been taking care of her at home. In March, he decided to take her to a neurologist which he resisted. His goal which is shared by the daughter is for comfort. The neurologist prescribed the Depakote at 500mg at bedtime. This was helpful for a short period of time. They have not noticed any change with the increase to three times " "a day. She also tried Seroquel which did not have any effect. They both noticed improved on her anxiety with the Zyprexa.  They did not comment on the Remeron nor the Lexapro. They would like her on the medication that helps her anxiety and weepiness but are worried about her being on so many agents.   Nursing also reports today that patient has been having hallucinations of little children and \"strings in her fingers\" that she plays with.  They do not seem to bother Soraya.  She also sundowns fairly significantly and is leaning towards the end of the day after her constant wandering and c/o back pain.    Nursing reports patient sleeps well.      Past Medical and Surgical History reviewed in Epic today.    MEDICATIONS:  Current Outpatient Medications   Medication Sig Dispense Refill     acetaminophen (TYLENOL) 325 MG tablet Take 2 tablets (650 mg) by mouth 2 times daily. May also take 2 tablets (650 mg) 2 times daily as needed for mild pain.       SENNA-docusate sodium (SENNA S) 8.6-50 MG tablet Take 1 tablet by mouth 2 times daily as needed (constipation)       cyanocobalamin (VITAMIN B-12) 100 MCG tablet Take 100 mcg by mouth daily       divalproex sodium delayed-release (DEPAKOTE) 250 MG DR tablet Take 1 tablet (250 mg) by mouth 3 times daily       escitalopram (LEXAPRO) 20 MG tablet TAKE ONE TABLET BY MOUTH ONCE DAILY 90 tablet 1     metoprolol succinate ER (TOPROL-XL) 50 MG 24 hr tablet Take 1 tablet (50 mg) by mouth daily 90 tablet 3     mirtazapine (REMERON) 15 MG tablet Take 1 tablet (15 mg) by mouth At Bedtime       OLANZapine (ZYPREXA) 2.5 MG tablet Take 1 tablet (2.5 mg) by mouth 3 times daily. May also take 1 tablet (2.5 mg) nightly as needed (delusions, hallucinations, agitation).       simvastatin (ZOCOR) 10 MG tablet TAKE ONE TABLET BY MOUTH AT BEDTIME 90 tablet 3     REVIEW OF SYSTEMS:  Limited secondary to cognitive impairment but today pt reports 10 point ROS of systems including Constitutional, " Eyes, Respiratory, Cardiovascular, Gastroenterology, Genitourinary, Integumentary, Musculoskeletal, Psychiatric were all negative except for pertinent positives noted in my HPI.    Objective:  /84   Pulse 72   Temp 98.1  F (36.7  C)   Resp 17   Wt 69.1 kg (152 lb 6.4 oz)   SpO2 97%   BMI 27.43 kg/m    Exam:  GENERAL APPEARANCE:  Alert, in no distress, pleasantly confused, tangential in speech  RESP:  respiratory effort and palpation of chest normal, auscultation of lungs clear, no respiratory distress  CV:  Palpation and auscultation of heart done , rate and rhythm regular, no murmur, mild LE peripheral edema, PVD changes present   ABDOMEN:  normal bowel sounds, soft, nontender, no hepatosplenomegaly or other masses  M/S:   Gait and station ambulatory without device, Digits and nails with arthritic changes, reduced muscle mass  SKIN:  Inspection and Palpation of skin and subcutaneous tissue pale, PVD changes to LEs  PSYCH:  insight and judgement, memory with impairment, affect and mood pleasant but anxious, does not follow commands readily     Labs:   reviewed    ASSESSMENT/PLAN:  Late onset Alzheimer's disease with behavioral disturbance (H)  Delusions (H)   Anxiety  Prev living at home with her  but has had increase in confusion and agitation, difficult to redirect  On VItamin B12 100 mcg daily (8/23/20 - level was 843)  She is also on Depakote 250 mg TID, escitalopram 20 mg daily, mirtazapine 15 mg q HS, olanzapine 2.5 mg TID, Olanzapine 2.5 mg q HS PRN - utilized x 1     Nursing reports patient has settled in a little more but does have hallucinations of little children and seemingly has strings between her fingers.  She does not seem bothered by these hallucinations.  Nursing reports patient's constant wandering (now resides in memory care unit) and possible back pain by the end of the day; significant sundowning by the end of the day.    Nursing reported that family's goal is comfort, saw  initial positive results when Depakote 500 mg daily started, not a lot more positive results after increasing frequency to TID. Family did not see significant results from seroquel but has seem good results with Olanzapine.      Visit today with patient pleasantly confused but anxious.      PLAN:   - Continue Vitamin B12  - continue Depakote 250 mg TID and monitor for titration needs  - continue escitalopram 20 mg daily (known recommendation not >10 mg in elderly)  - continue mirtazapine but monitor for need   - continue olanzapine 2.5 mg TID as hallucinations present  - continue olanzapine 2.5 mg q HS PRN x 14 days, montitor for usage and effectiveness  - nursing for supportive cares   - overall goal to allow patient to settle in and then GDR meds.  Family goal is comfort     Essential hypertension  Hyperlipidemia LDL goal <130  On Toprol XL 50 mg daily, simvastatin 10 mg q HS  BPs mostly 130s/70-80s  HRs 70-80s mostly  Patient denies CP, HA, lightheadedness (may be poor historian)      PLAN:   - Toprol XL 50 mg daily  - simvastatin 10 mg q HS  - BP goals are ~130 -165/60 -90 mmHg.This is higher than ACC and AHA recommendations due to risk for hypotension, risk of dizziness and falls, risk of tissue/cerebral hypoperfusion and frailty. Patient is stable with current plan of care and routine assessment.     Pain  On Tylenol 650 mg q 4 hours PRN  Patient reports no pain (may be poor historian)  Nursing reports patient leaning towards the end of the day, c/o back pain possibly 2/2 constant wandering      PLAN:   - Tylenol 650 mg po BID  - Tylenol 650 mg BID PRN  - monitor for pain, behavior changes     Orders written by provider at facility  1. Tylenol 650 mg BID  2. Tylenol 650 mg BID PRN  3. Senna S 1 tab BID PRN Dx: constipation       Electronically signed by:  AARON Aggarwal CNP

## 2020-08-31 ENCOUNTER — NURSING HOME VISIT (OUTPATIENT)
Dept: GERIATRICS | Facility: CLINIC | Age: 70
End: 2020-08-31
Payer: COMMERCIAL

## 2020-08-31 VITALS
TEMPERATURE: 98.1 F | OXYGEN SATURATION: 97 % | DIASTOLIC BLOOD PRESSURE: 84 MMHG | BODY MASS INDEX: 27.43 KG/M2 | WEIGHT: 152.4 LBS | RESPIRATION RATE: 17 BRPM | SYSTOLIC BLOOD PRESSURE: 136 MMHG | HEART RATE: 72 BPM

## 2020-08-31 DIAGNOSIS — G30.1 LATE ONSET ALZHEIMER'S DISEASE WITH BEHAVIORAL DISTURBANCE (H): Primary | ICD-10-CM

## 2020-08-31 DIAGNOSIS — F22 DELUSIONS (H): ICD-10-CM

## 2020-08-31 DIAGNOSIS — F41.9 ANXIETY: ICD-10-CM

## 2020-08-31 DIAGNOSIS — R52 PAIN: ICD-10-CM

## 2020-08-31 DIAGNOSIS — E78.5 HYPERLIPIDEMIA LDL GOAL <130: ICD-10-CM

## 2020-08-31 DIAGNOSIS — I10 ESSENTIAL HYPERTENSION: ICD-10-CM

## 2020-08-31 DIAGNOSIS — K59.00 CONSTIPATION, UNSPECIFIED CONSTIPATION TYPE: ICD-10-CM

## 2020-08-31 DIAGNOSIS — F02.818 LATE ONSET ALZHEIMER'S DISEASE WITH BEHAVIORAL DISTURBANCE (H): Primary | ICD-10-CM

## 2020-08-31 PROCEDURE — 99309 SBSQ NF CARE MODERATE MDM 30: CPT | Performed by: NURSE PRACTITIONER

## 2020-08-31 RX ORDER — ACETAMINOPHEN 325 MG/1
TABLET ORAL
Start: 2020-08-31 | End: 2021-03-07

## 2020-08-31 RX ORDER — SENNA AND DOCUSATE SODIUM 50; 8.6 MG/1; MG/1
1 TABLET, FILM COATED ORAL 2 TIMES DAILY PRN
Start: 2020-08-31

## 2020-08-31 NOTE — LETTER
"    8/31/2020        RE: Shari Graham  [facility]  [address] [address]        [facility]  [place] Medical Record Number:  [id number]  Place of Service where encounter took place:  [facility] ([facility]) [[id number]]  Chief Complaint   Patient presents with     RECHECK       HPI:    Shari Graham  is a 69 year old (1950), who is being seen today for an episodic care visit.  HPI information obtained from: facility chart records, facility staff, patient report and [facility] chart review. Today's concern is:     Late onset Alzheimer's disease with behavioral disturbance (H)  Delusions (H)  Anxiety  Essential hypertension  Hyperlipidemia LDL goal <130  Pain  Constipation, unspecified constipation type     Patient is a 69 year old woman with PMH including progressive Alzheimer's Dementia, HTN, HLD who has been seen at [facility] ED on 8/20/20 and [facility] ED/[facility] 8/22-24 for increased agitation, confusion.  Head CT was neg, no evidence of pyuria, CHEST XRAY clear, no metabolic etiology found.  It was felt her altered mental status was 2/2 progressing dementia and she was moved to [facility] memory care unit for increased care needs.   Family's goals (per nursing) is comfort.      Recent Medication Changes:  - 8/27/20: Olanzapine 2.5 mg TID, Olanzapine 2.5 mg q HS PRN x 14 days, discontinue Seroquel , Vitamin D 1000 units daily.    Patient is met today in the memory care SNF where she is pleasantly confused.  She is tangential in speech but answers my questions, however; it is clear she is not understanding my questions.  She reports \"no\" to all my questions but is likely a very poor historian.    Nursing reports:  (Patient's ) had been taking care of her at home. In March, he decided to take her to a neurologist which he resisted. His goal which is shared by the daughter is for comfort. The neurologist prescribed the Depakote at 500mg at bedtime. " "This was helpful for a short period of time. They have not noticed any change with the increase to three times a day. She also tried Seroquel which did not have any effect. They both noticed improved on her anxiety with the Zyprexa.  They did not comment on the Remeron nor the Lexapro. They would like her on the medication that helps her anxiety and weepiness but are worried about her being on so many agents.   Nursing also reports today that patient has been having hallucinations of little children and \"strings in her fingers\" that she plays with.  They do not seem to bother Soraya.  She also sundowns fairly significantly and is leaning towards the end of the day after her constant wandering and c/o back pain.    Nursing reports patient sleeps well.      Past Medical and Surgical History reviewed in Epic today.    MEDICATIONS:  Current Outpatient Medications   Medication Sig Dispense Refill     acetaminophen (TYLENOL) 325 MG tablet Take 2 tablets (650 mg) by mouth 2 times daily. May also take 2 tablets (650 mg) 2 times daily as needed for mild pain.       SENNA-docusate sodium (SENNA S) 8.6-50 MG tablet Take 1 tablet by mouth 2 times daily as needed (constipation)       cyanocobalamin (VITAMIN B-12) 100 MCG tablet Take 100 mcg by mouth daily       divalproex sodium delayed-release (DEPAKOTE) 250 MG DR tablet Take 1 tablet (250 mg) by mouth 3 times daily       escitalopram (LEXAPRO) 20 MG tablet TAKE ONE TABLET BY MOUTH ONCE DAILY 90 tablet 1     metoprolol succinate ER (TOPROL-XL) 50 MG 24 hr tablet Take 1 tablet (50 mg) by mouth daily 90 tablet 3     mirtazapine (REMERON) 15 MG tablet Take 1 tablet (15 mg) by mouth At Bedtime       OLANZapine (ZYPREXA) 2.5 MG tablet Take 1 tablet (2.5 mg) by mouth 3 times daily. May also take 1 tablet (2.5 mg) nightly as needed (delusions, hallucinations, agitation).       simvastatin (ZOCOR) 10 MG tablet TAKE ONE TABLET BY MOUTH AT BEDTIME 90 tablet 3     REVIEW OF " SYSTEMS:  Limited secondary to cognitive impairment but today pt reports 10 point ROS of systems including Constitutional, Eyes, Respiratory, Cardiovascular, Gastroenterology, Genitourinary, Integumentary, Musculoskeletal, Psychiatric were all negative except for pertinent positives noted in my HPI.    Objective:  /84   Pulse 72   Temp 98.1  F (36.7  C)   Resp 17   Wt 69.1 kg (152 lb 6.4 oz)   SpO2 97%   BMI 27.43 kg/m    Exam:  GENERAL APPEARANCE:  Alert, in no distress, pleasantly confused, tangential in speech  RESP:  respiratory effort and palpation of chest normal, auscultation of lungs clear, no respiratory distress  CV:  Palpation and auscultation of heart done , rate and rhythm regular, no murmur, mild LE peripheral edema, PVD changes present   ABDOMEN:  normal bowel sounds, soft, nontender, no hepatosplenomegaly or other masses  M/S:   Gait and station ambulatory without device, Digits and nails with arthritic changes, reduced muscle mass  SKIN:  Inspection and Palpation of skin and subcutaneous tissue pale, PVD changes to LEs  PSYCH:  insight and judgement, memory with impairment, affect and mood pleasant but anxious, does not follow commands readily     Labs:   reviewed    ASSESSMENT/PLAN:  Late onset Alzheimer's disease with behavioral disturbance (H)  Delusions (H)   Anxiety  Prev living at home with her  but has had increase in confusion and agitation, difficult to redirect  On VItamin B12 100 mcg daily (8/23/20 - level was 843)  She is also on Depakote 250 mg TID, escitalopram 20 mg daily, mirtazapine 15 mg q HS, olanzapine 2.5 mg TID, Olanzapine 2.5 mg q HS PRN - utilized x 1     Nursing reports patient has settled in a little more but does have hallucinations of little children and seemingly has strings between her fingers.  She does not seem bothered by these hallucinations.  Nursing reports patient's constant wandering (now resides in memory care unit) and possible back pain by  the end of the day; significant sundowning by the end of the day.    Nursing reported that family's goal is comfort, saw initial positive results when Depakote 500 mg daily started, not a lot more positive results after increasing frequency to TID. Family did not see significant results from seroquel but has seem good results with Olanzapine.      Visit today with patient pleasantly confused but anxious.      PLAN:   - Continue Vitamin B12  - continue Depakote 250 mg TID and monitor for titration needs  - continue escitalopram 20 mg daily (known recommendation not >10 mg in elderly)  - continue mirtazapine but monitor for need   - continue olanzapine 2.5 mg TID as hallucinations present  - continue olanzapine 2.5 mg q HS PRN x 14 days, montitor for usage and effectiveness  - nursing for supportive cares   - overall goal to allow patient to settle in and then GDR meds.  Family goal is comfort     Essential hypertension  Hyperlipidemia LDL goal <130  On Toprol XL 50 mg daily, simvastatin 10 mg q HS  BPs mostly 130s/70-80s  HRs 70-80s mostly  Patient denies CP, HA, lightheadedness (may be poor historian)      PLAN:   - Toprol XL 50 mg daily  - simvastatin 10 mg q HS  - BP goals are ~130 -165/60 -90 mmHg.This is higher than ACC and AHA recommendations due to risk for hypotension, risk of dizziness and falls, risk of tissue/cerebral hypoperfusion and frailty. Patient is stable with current plan of care and routine assessment.     Pain  On Tylenol 650 mg q 4 hours PRN  Patient reports no pain (may be poor historian)  Nursing reports patient leaning towards the end of the day, c/o back pain possibly 2/2 constant wandering      PLAN:   - Tylenol 650 mg po BID  - Tylenol 650 mg BID PRN  - monitor for pain, behavior changes     Orders written by provider at facility  1. Tylenol 650 mg BID  2. Tylenol 650 mg BID PRN  3. Senna S 1 tab BID PRN Dx: constipation       Electronically signed by:  AARON Aggarwal CNP                Sincerely,        AARON Aggarwal CNP

## 2020-09-01 ENCOUNTER — TELEPHONE (OUTPATIENT)
Dept: FAMILY MEDICINE | Facility: CLINIC | Age: 70
End: 2020-09-01

## 2020-09-01 DIAGNOSIS — Z53.9 DIAGNOSIS NOT YET DEFINED: Primary | ICD-10-CM

## 2020-09-01 PROCEDURE — G0180 MD CERTIFICATION HHA PATIENT: HCPCS | Performed by: FAMILY MEDICINE

## 2020-09-01 NOTE — TELEPHONE ENCOUNTER
Forms received from [facility]: C and POC, 8/16-10/14 for Rosita Calhoun MD.  Forms placed in provider 'sign me' folder.  Please fax forms to [phone] after completion.    Santiago Boykin

## 2020-09-02 ENCOUNTER — NURSING HOME VISIT (OUTPATIENT)
Dept: GERIATRICS | Facility: CLINIC | Age: 70
End: 2020-09-02
Payer: COMMERCIAL

## 2020-09-02 VITALS
HEIGHT: 62 IN | WEIGHT: 152.4 LBS | BODY MASS INDEX: 28.05 KG/M2 | TEMPERATURE: 98 F | SYSTOLIC BLOOD PRESSURE: 136 MMHG | DIASTOLIC BLOOD PRESSURE: 84 MMHG | RESPIRATION RATE: 17 BRPM | OXYGEN SATURATION: 97 % | HEART RATE: 72 BPM

## 2020-09-02 DIAGNOSIS — R79.89 ELEVATED TSH: ICD-10-CM

## 2020-09-02 DIAGNOSIS — F41.9 ANXIETY: ICD-10-CM

## 2020-09-02 DIAGNOSIS — F02.818 LATE ONSET ALZHEIMER'S DISEASE WITH BEHAVIORAL DISTURBANCE (H): Primary | ICD-10-CM

## 2020-09-02 DIAGNOSIS — G30.1 LATE ONSET ALZHEIMER'S DISEASE WITH BEHAVIORAL DISTURBANCE (H): Primary | ICD-10-CM

## 2020-09-02 PROCEDURE — 99304 1ST NF CARE SF/LOW MDM 25: CPT | Performed by: INTERNAL MEDICINE

## 2020-09-02 RX ORDER — FAMOTIDINE 20 MG
1000 TABLET ORAL DAILY
COMMUNITY

## 2020-09-02 RX ORDER — ESCITALOPRAM OXALATE 20 MG/1
20 TABLET ORAL AT BEDTIME
COMMUNITY

## 2020-09-02 ASSESSMENT — MIFFLIN-ST. JEOR: SCORE: 1159.53

## 2020-09-02 NOTE — PROGRESS NOTES
"[facility]  PHYSICIAN NOTE    Chief Complaint   Patient presents with     [facility] Care       HPI:    Shari Graham is a 70 year old  (1950), who is being seen today for a federally mandated E/M visit at [facility] . Admitted to LTC in August 2020. She has h/o advanced dementia previously living with her  with daughter Gracie very actively involved in her life. Significant sundowning and hallucinations with increased frailty/falls and increased care needs as well as anxiety with skin picking behavior. She had increased tearfulness and was \"inconsolable\" with difficulty redirecting her at home and was admitted to [facility] at the end of August before moving to [facility] term [facility]. While hospitalized, CT head unremarkable for acute findings, B12 within normal limits and medications adjusted significantly including: (Adjustment of Depakote 500 mg at bedtime to 250 mg TID, new Remeron, new Olanzapine, new Seroquel PRN, discontinuation of Trazodone); continued on 20 mg Lexapro.    Since here at [facility], per staff she occasionally hallucinates of little children and is constantly wandering the halls so much so that she c/o back pain. Has significant sundowning later in the day. Per staff, family's goal is comfort. PRN Seroquel has been discontinued and instead she has PRN Olanzapine with scheduled dosing as well. Scheduled Tylenol added for her back pain. RNs think its not time to reduce meds as she is still quite anxious though seems to sleep ok.     I spoke with Soraya as we walked through the hallway as she was pacing though she seemed to enjoy the visit. She did c/o back pain and she grabbed it as we walked. I was able to encourage her to sit and we did and she said it felt better. Occasionally was picking her arm and leg scabs as we spoke; no bleeding. She spoke of various topics: weather, her mom/sister and how many children she has " (with number varying from 3 to 6 to 8). She seemed anxious but not disturbed and was able to pause her conversation when I asked questions though responses were tangential.    ALLERGIES: Patient has no known allergies.    Past Medical History:   Diagnosis Date     Dementia (H)      Hypertension      Mixed hyperlipidemia     Hyperlipidemia      Past Surgical History:   Procedure Laterality Date     APPENDECTOMY       COLONOSCOPY       HYSTERECTOMY, PAP NO LONGER INDICATED      for reasons of fibroids     SURGICAL HISTORY OF -   grade school    neck cyst removed     SURGICAL HISTORY OF -       right cyst removed on wrist      Family History   Problem Relation Age of Onset     Heart Disease Mother      Diabetes Mother              Hypertension Mother      Cancer Father      Respiratory Father      Prostate Cancer Father              Alzheimer Disease Paternal Grandmother      Prostate Cancer Paternal Grandfather      Prostate Cancer Brother      Breast Cancer Sister      Diabetes Maternal Grandmother              Breast Cancer Sister      Prostate Cancer Brother      Social History     Tobacco Use     Smoking status: Former Smoker     Packs/day: 0.50     Years: 11.00     Pack years: 5.50     Types: Cigarettes     Last attempt to quit: 1981     Years since quittin.1     Smokeless tobacco: Never Used   Substance Use Topics     Alcohol use: Yes     Comment: 2/wk     Drug use: No     CODE STATUS: DNR/I    MEDICATIONS: Reviewed and updated in [facility] according to facility MAR  Current Outpatient Medications   Medication Sig Dispense Refill     acetaminophen (TYLENOL) 325 MG tablet Take 2 tablets (650 mg) by mouth 2 times daily. May also take 2 tablets (650 mg) 2 times daily as needed for mild pain.       cyanocobalamin (VITAMIN B-12) 100 MCG tablet Take 100 mcg by mouth daily       divalproex sodium delayed-release (DEPAKOTE) 250 MG DR tablet Take 1 tablet (250 mg) by mouth 3 times  "daily       escitalopram (LEXAPRO) 20 MG tablet Take 20 mg by mouth At Bedtime       metoprolol succinate ER (TOPROL-XL) 50 MG 24 hr tablet Take 1 tablet (50 mg) by mouth daily 90 tablet 3     mirtazapine (REMERON) 15 MG tablet Take 1 tablet (15 mg) by mouth At Bedtime       OLANZapine (ZYPREXA) 2.5 MG tablet Take 1 tablet (2.5 mg) by mouth 3 times daily. May also take 1 tablet (2.5 mg) nightly as needed (delusions, hallucinations, agitation).       SENNA-docusate sodium (SENNA S) 8.6-50 MG tablet Take 1 tablet by mouth 2 times daily as needed (constipation)       simvastatin (ZOCOR) 10 MG tablet TAKE ONE TABLET BY MOUTH AT BEDTIME 90 tablet 3     Vitamin D, Cholecalciferol, 25 MCG (1000 UT) CAPS Take 1,000 Units by mouth daily         ROS:  Limited secondary to cognitive impairment but today pt reports as above in HPI    Exam:  /84   Pulse 72   Temp 98  F (36.7  C)   Resp 17   Ht 1.575 m (5' 2\")   Wt 69.1 kg (152 lb 6.4 oz)   SpO2 97%   BMI 27.87 kg/m    Alert, pleasant, casually dressed, well groomed without odor  Anxious, talkative, able to be redirected and follows simple direction  Her stories are tangential and don't directly relate to questions asked  Able to walk without assist device though leans forward and rubs her back  Picks at a few scabs on arms/legs without active bleeding    Lab/Diagnostic Data:    Negative COVID-19 screen prior to admission and negative Quantiferon  Normal CMP & CBC 8/22/20  TSH 5.17 with Free T4 within normal limits at 1.1 (likely normal TSH elevation with aging)    ASSESSMENT/PLAN:  Late onset Alzheimer's disease with behavioral disturbance (H)  Anxiety  Work on establishing routine, sense of comfort, redirection and adjusting medications as needed with goal eventual GDR as she settles in  Staff doing well keeping watch over her in community area of memory care unit  No medication changes made today  Would not increase Remeron further than 15 mg as higher doses can " be more stimulating for elderly folks    Elevated TSH  TSH 5.17 with Free T4 within normal limits at 1.1 per Care[facility] last month (likely normal TSH elevation with aging and no f/u needed)      Electronically signed by:  Lurdes Sinclair DO

## 2020-09-02 NOTE — LETTER
"    9/2/2020        RE: Shari Graham  [facility]  [address] [address]        [place] [facility]  PHYSICIAN NOTE    Chief Complaint   Patient presents with     [facility]       HPI:    Shari Graham is a 70 year old  (1950), who is being seen today for a federally mandated E/M visit at [facility] . Admitted to LTC in August 2020. She has h/o advanced dementia previously living with her  with daughter Gracie very actively involved in her life. Significant sundowning and hallucinations with increased frailty/falls and increased care needs as well as anxiety with skin picking behavior. She had increased tearfulness and was \"inconsolable\" with difficulty redirecting her at home and was admitted to [facility] at the end of August before moving to [facility]. While hospitalized, CT head unremarkable for acute findings, B12 within normal limits and medications adjusted significantly including: (Adjustment of Depakote 500 mg at bedtime to 250 mg TID, new Remeron, new Olanzapine, new Seroquel PRN, discontinuation of Trazodone); continued on 20 mg Lexapro.    Since here at [facility], per staff she occasionally hallucinates of little children and is constantly wandering the halls so much so that she c/o back pain. Has significant sundowning later in the day. Per staff, family's goal is comfort. PRN Seroquel has been discontinued and instead she has PRN Olanzapine with scheduled dosing as well. Scheduled Tylenol added for her back pain. RNs think its not time to reduce meds as she is still quite anxious though seems to sleep ok.     I spoke with Soraya as we walked through the hallway as she was pacing though she seemed to enjoy the visit. She did c/o back pain and she grabbed it as we walked. I was able to encourage her to sit and we did and she said it felt better. Occasionally was picking her arm and leg scabs as " we spoke; no bleeding. She spoke of various topics: weather, her mom/sister and how many children she has (with number varying from 3 to 6 to 8). She seemed anxious but not disturbed and was able to pause her conversation when I asked questions though responses were tangential.    ALLERGIES: Patient has no known allergies.    Past Medical History:   Diagnosis Date     Dementia (H)      Hypertension      Mixed hyperlipidemia     Hyperlipidemia      Past Surgical History:   Procedure Laterality Date     APPENDECTOMY       COLONOSCOPY       HYSTERECTOMY, PAP NO LONGER INDICATED      for reasons of fibroids     SURGICAL HISTORY OF -   grade school    neck cyst removed     SURGICAL HISTORY OF -       right cyst removed on wrist      Family History   Problem Relation Age of Onset     Heart Disease Mother      Diabetes Mother              Hypertension Mother      Cancer Father      Respiratory Father      Prostate Cancer Father              Alzheimer Disease Paternal Grandmother      Prostate Cancer Paternal Grandfather      Prostate Cancer Brother      Breast Cancer Sister      Diabetes Maternal Grandmother              Breast Cancer Sister      Prostate Cancer Brother      Social History     Tobacco Use     Smoking status: Former Smoker     Packs/day: 0.50     Years: 11.00     Pack years: 5.50     Types: Cigarettes     Last attempt to quit: 1981     Years since quittin.1     Smokeless tobacco: Never Used   Substance Use Topics     Alcohol use: Yes     Comment: 2/wk     Drug use: No     CODE STATUS: DNR/I    MEDICATIONS: Reviewed and updated in [facility] according to facility MAR  Current Outpatient Medications   Medication Sig Dispense Refill     acetaminophen (TYLENOL) 325 MG tablet Take 2 tablets (650 mg) by mouth 2 times daily. May also take 2 tablets (650 mg) 2 times daily as needed for mild pain.       cyanocobalamin (VITAMIN B-12) 100 MCG tablet Take 100 mcg by mouth daily        "divalproex sodium delayed-release (DEPAKOTE) 250 MG DR tablet Take 1 tablet (250 mg) by mouth 3 times daily       escitalopram (LEXAPRO) 20 MG tablet Take 20 mg by mouth At Bedtime       metoprolol succinate ER (TOPROL-XL) 50 MG 24 hr tablet Take 1 tablet (50 mg) by mouth daily 90 tablet 3     mirtazapine (REMERON) 15 MG tablet Take 1 tablet (15 mg) by mouth At Bedtime       OLANZapine (ZYPREXA) 2.5 MG tablet Take 1 tablet (2.5 mg) by mouth 3 times daily. May also take 1 tablet (2.5 mg) nightly as needed (delusions, hallucinations, agitation).       SENNA-docusate sodium (SENNA S) 8.6-50 MG tablet Take 1 tablet by mouth 2 times daily as needed (constipation)       simvastatin (ZOCOR) 10 MG tablet TAKE ONE TABLET BY MOUTH AT BEDTIME 90 tablet 3     Vitamin D, Cholecalciferol, 25 MCG (1000 UT) CAPS Take 1,000 Units by mouth daily         ROS:  Limited secondary to cognitive impairment but today pt reports as above in HPI    Exam:  /84   Pulse 72   Temp 98  F (36.7  C)   Resp 17   Ht 1.575 m (5' 2\")   Wt 69.1 kg (152 lb 6.4 oz)   SpO2 97%   BMI 27.87 kg/m    Alert, pleasant, casually dressed, well groomed without odor  Anxious, talkative, able to be redirected and follows simple direction  Her stories are tangential and don't directly relate to questions asked  Able to walk without assist device though leans forward and rubs her back  Picks at a few scabs on arms/legs without active bleeding    Lab/Diagnostic Data:    Negative COVID-19 screen prior to admission and negative Quantiferon  Normal CMP & CBC 8/22/20  TSH 5.17 with Free T4 within normal limits at 1.1 (likely normal TSH elevation with aging)    ASSESSMENT/PLAN:  Late onset Alzheimer's disease with behavioral disturbance (H)  Anxiety  Work on establishing routine, sense of comfort, redirection and adjusting medications as needed with goal eventual GDR as she settles in  Staff doing well keeping watch over her in community area of memory care " unit  No medication changes made today  Would not increase Remeron further than 15 mg as higher doses can be more stimulating for elderly folks    Elevated TSH  TSH 5.17 with Free T4 within normal limits at 1.1 per [facility] last month (likely normal TSH elevation with aging and no f/u needed)      Electronically signed by:  Lurdes Sicnlair DO        Sincerely,        Lurdes Sniclair DO

## 2020-09-03 NOTE — TELEPHONE ENCOUNTER
Routed to Dr. Calhoun, daughter states you called her and she wants call back.    Yanna Peña RN  [facility]

## 2020-09-03 NOTE — TELEPHONE ENCOUNTER
Reason for Call:  Other     Detailed comments: Patient daughter stated she received a call on 9/1 from pcp and is returning the call. Patient daughter requesting for ONLY pcp to call back. Please advise.     Phone Number Patient can be reached at: Other phone number:  [phone]    Best Time: Anytime     Can we leave a detailed message on this number? YES    Call taken on 9/3/2020 at 3:55 PM by Rand Uribe

## 2020-10-07 NOTE — PROGRESS NOTES
[place] [facility]  Chief Complaint   Patient presents with     assisted Regulatory     [place] Medical Record Number:  [id number]  Place of Service where encounter took place:  [facility] (SNF) [[id number]]    HPI:    Shari Graham  is 70 year old (1950), who is being seen today for a federally mandated E/M visit.  HPI information obtained from: facility chart records, facility staff, patient report and [facility] chart review. Today's concerns are:     Late onset Alzheimer's disease with behavioral disturbance (H)  Anxiety  Delusions (H)  Essential hypertension  Hyperlipidemia LDL goal <130  Constipation, unspecified constipation type  Elevated TSH     Patient is a 69 year old woman with PMH including progressive Alzheimer's Dementia, HTN, HLD who has been seen at [facility] ED on 8/20/20 and [facility] ED/[facility] 8/22-24 for increased agitation, confusion.  Head CT was neg, no evidence of pyuria, CHEST XRAY clear, no metabolic etiology found.  It was felt her altered mental status was 2/2 progressing dementia and she was moved to [facility] memory care unit for increased care needs.   Family's goals (per nursing) is comfort.       Recent Medication Changes:  - 8/27/20: Olanzapine 2.5 mg TID, Olanzapine 2.5 mg q HS PRN x 14 days, discontinue Seroquel , Vitamin D 1000 units daily.  - 8/31/20: Tylenol 650 mg BID, Tylenol 650 mg BID PRN, Senna S 1 tab BID PRN     Patient is met today today in her SNF where she is walked back to her memory care room for our visit.  She ambulates with me holding her hand and is not leaning one way or another (as she reportedly was doing in the past) but also is not 100% stable on her feet.  She uses no AE and likely would not remember to if given one.  She denies pain while ambulating with me.   Once we are in her room she denies pain, shortness of breath, CP, HA, lightheadedness, heartburn, upset stomach, constipation.  It is noted she is likely a poor  historian d/t dementia. Patient reports her sleeps ig ood but endorses no appetite.    Nursing reports patient has settled into SNF well, is no longer crying anymore and not as anxious.        ALLERGIES:Patient has no known allergies.  PAST MEDICAL HISTORY:   has a past medical history of Dementia (H), Hypertension, and Mixed hyperlipidemia. She also has no past medical history of Arthritis, Cancer (H), Cerebral infarction (H), Congestive heart failure (H), Congestive heart failure, unspecified, COPD (chronic obstructive pulmonary disease) (H), Depressive disorder, Diabetes (H), Heart disease, History of blood transfusion, Thyroid disease, or Uncomplicated asthma.  PAST SURGICAL HISTORY:   has a past surgical history that includes surgical history of -  (grade school); hysterectomy, pap no longer indicated (1995); surgical history of -  (2011); appendectomy; and colonoscopy.  FAMILY HISTORY: family history includes Alzheimer Disease in her paternal grandmother; Breast Cancer in her sister and sister; Cancer in her father; Diabetes in her maternal grandmother and mother; Heart Disease in her mother; Hypertension in her mother; Prostate Cancer in her brother, brother, father, and paternal grandfather; Respiratory in her father.  SOCIAL HISTORY:  reports that she quit smoking about 39 years ago. Her smoking use included cigarettes. She has a 5.50 pack-year smoking history. She has never used smokeless tobacco. She reports current alcohol use. She reports that she does not use drugs.    MEDICATIONS:  Current Outpatient Medications   Medication Sig Dispense Refill     OLANZapine (ZYPREXA) 2.5 MG tablet Take 1 tablet (2.5 mg) by mouth 2 times daily       acetaminophen (TYLENOL) 325 MG tablet Take 2 tablets (650 mg) by mouth 2 times daily. May also take 2 tablets (650 mg) 2 times daily as needed for mild pain.       cyanocobalamin (VITAMIN B-12) 100 MCG tablet Take 100 mcg by mouth daily       divalproex sodium  delayed-release (DEPAKOTE) 250 MG DR tablet Take 1 tablet (250 mg) by mouth 3 times daily       escitalopram (LEXAPRO) 20 MG tablet Take 20 mg by mouth At Bedtime       metoprolol succinate ER (TOPROL-XL) 50 MG 24 hr tablet Take 1 tablet (50 mg) by mouth daily 90 tablet 3     mirtazapine (REMERON) 15 MG tablet Take 1 tablet (15 mg) by mouth At Bedtime       SENNA-docusate sodium (SENNA S) 8.6-50 MG tablet Take 1 tablet by mouth 2 times daily as needed (constipation)       simvastatin (ZOCOR) 10 MG tablet TAKE ONE TABLET BY MOUTH AT BEDTIME 90 tablet 3     Vitamin D, Cholecalciferol, 25 MCG (1000 UT) CAPS Take 1,000 Units by mouth daily       Case Management:  I have reviewed the care plan and MDS and do agree with the plan. Patient's desire to return to the community is not assessible due to cognitive impairment. Information reviewed:  Medications, vital signs, orders, and nursing notes.    ROS:  Limited secondary to cognitive impairment but today pt reports 10 point ROS of systems including Constitutional, Eyes, Respiratory, Cardiovascular, Gastroenterology, Genitourinary, Integumentary, Musculoskeletal, Psychiatric were all negative except for pertinent positives noted in my HPI.    Vitals:  /79   Pulse 72   Temp 97.8  F (36.6  C)   Resp 20   Wt 67.4 kg (148 lb 8 oz)   SpO2 96%   BMI 27.16 kg/m    Body mass index is 27.16 kg/m .  Exam:  GENERAL APPEARANCE:  Alert, in no distress, smiling and pleasantly confused woman   RESP:  respiratory effort and palpation of chest normal, auscultation of lungs clear , no respiratory distress  CV:  Palpation and auscultation of heart done , rate and rhythm regular, no murmur, no LE peripheral edema  ABDOMEN:  normal bowel sounds, soft, nontender, no hepatosplenomegaly or other masses  M/S:   Gait and station ambulatory without AE but not entirely stable on her feet, Digits and nails with arthritic changes, reduced muscle mass  SKIN:  Inspection and Palpation of skin  and subcutaneous tissue intact, no rashes appreciated  PSYCH:  insight and judgement, memory with impairment, affect and mood normal, follows commands readily but forgets quickly       Lab/Diagnostic data:   Labs done while at Skilled Nursing Facility done by [facility]. Pertinent results are: reviewed in SNF EHR    ASSESSMENT/PLAN  Late onset Alzheimer's disease with behavioral disturbance (H)  Delusions (H)   Anxiety  Prev living at home with her  but hbut had increase in confusion and agitation, difficult to redirect so moved to SNF for increased care needs  On VItamin B12 100 mcg daily (8/23/20 - level was 843)  She is also on Depakote 250 mg TID, escitalopram 20 mg daily, mirtazapine 15 mg q HS, olanzapine 2.5 mg TID     Nursing reports patient has settle in better, no longer crying or as anxious.   Recent SNF PHQ9 - 3        PLAN:   - Continue Vitamin B12  - continue Depakote 250 mg TID   - continue escitalopram 20 mg daily (known recommendation not >10 mg in elderly)  - continue mirtazapine but monitor for need   - GDR olanzapine 2.5 mg to BID and monitor for ability to further GDR as daily only covered by insurance   - nursing for supportive cares   - Family goal is comfort     Essential hypertension  Hyperlipidemia LDL goal <130  On Toprol XL 50 mg daily, simvastatin 10 mg q HS  BPs 120-130s/70-80s  HRs 70-80s  Patient denies CP, HA, lightheadedness      PLAN:   - Toprol XL 50 mg daily  - simvastatin 10 mg q HS  - BP goals are  <140/90 mm Hg.This is higher than ACC and AHA recommendations due to risk for hypotension, risk of dizziness and falls and risk of tissue/cerebral hypoperfusion. Patient is stable with current plan of care and routine assessment.       Pain  On Tylenol 650 mg  BID and 650 mg BID PRN  Patient reports no pain ( may be poor historian)      PLAN:   - Tylenol 650 mg po BID  - Tylenol 650 mg BID PRN  - no changes as decreasing olanzapine and best to adjust one med at a time  for memory care patient who may be poor  of reason for agiation   - monitor for pain, behavior changes     Orders written by provider at facility  1. Decrease olanzapine to 2.5 mg BID      Electronically signed by:  AARON Aggarwal CNP

## 2020-10-08 ENCOUNTER — NURSING HOME VISIT (OUTPATIENT)
Dept: GERIATRICS | Facility: CLINIC | Age: 70
End: 2020-10-08
Payer: COMMERCIAL

## 2020-10-08 VITALS
WEIGHT: 148.5 LBS | TEMPERATURE: 97.8 F | RESPIRATION RATE: 20 BRPM | OXYGEN SATURATION: 96 % | BODY MASS INDEX: 27.16 KG/M2 | HEART RATE: 72 BPM | DIASTOLIC BLOOD PRESSURE: 79 MMHG | SYSTOLIC BLOOD PRESSURE: 119 MMHG

## 2020-10-08 DIAGNOSIS — F02.818 LATE ONSET ALZHEIMER'S DISEASE WITH BEHAVIORAL DISTURBANCE (H): Primary | ICD-10-CM

## 2020-10-08 DIAGNOSIS — R79.89 ELEVATED TSH: ICD-10-CM

## 2020-10-08 DIAGNOSIS — F41.9 ANXIETY: ICD-10-CM

## 2020-10-08 DIAGNOSIS — K59.00 CONSTIPATION, UNSPECIFIED CONSTIPATION TYPE: ICD-10-CM

## 2020-10-08 DIAGNOSIS — G30.1 LATE ONSET ALZHEIMER'S DISEASE WITH BEHAVIORAL DISTURBANCE (H): Primary | ICD-10-CM

## 2020-10-08 DIAGNOSIS — I10 ESSENTIAL HYPERTENSION: ICD-10-CM

## 2020-10-08 DIAGNOSIS — E78.5 HYPERLIPIDEMIA LDL GOAL <130: ICD-10-CM

## 2020-10-08 DIAGNOSIS — F22 DELUSIONS (H): ICD-10-CM

## 2020-10-08 PROCEDURE — 99309 SBSQ NF CARE MODERATE MDM 30: CPT | Performed by: NURSE PRACTITIONER

## 2020-10-08 RX ORDER — OLANZAPINE 2.5 MG/1
2.5 TABLET, FILM COATED ORAL 2 TIMES DAILY
Start: 2020-10-08 | End: 2020-10-20

## 2020-10-08 NOTE — LETTER
10/8/2020        RE: Shari Graham  [facility]  [address] [address]        [place] [facility]  Chief Complaint   Patient presents with     half-way Regulatory     [place] Medical Record Number:  [id number]  Place of Service where encounter took place:  [facility] (SNF) [[id number]]    HPI:    Shari Graham  is 70 year old (1950), who is being seen today for a federally mandated E/M visit.  HPI information obtained from: facility chart records, facility staff, patient report and [facility] chart review. Today's concerns are:     Late onset Alzheimer's disease with behavioral disturbance (H)  Anxiety  Delusions (H)  Essential hypertension  Hyperlipidemia LDL goal <130  Constipation, unspecified constipation type  Elevated TSH     Patient is a 69 year old woman with PMH including progressive Alzheimer's Dementia, HTN, HLD who has been seen at [facility] ED on 8/20/20 and [facility] ED/[facility] 8/22-24 for increased agitation, confusion.  Head CT was neg, no evidence of pyuria, CHEST XRAY clear, no metabolic etiology found.  It was felt her altered mental status was 2/2 progressing dementia and she was moved to [facility] memory care unit for increased care needs.   Family's goals (per nursing) is comfort.       Recent Medication Changes:  - 8/27/20: Olanzapine 2.5 mg TID, Olanzapine 2.5 mg q HS PRN x 14 days, discontinue Seroquel , Vitamin D 1000 units daily.  - 8/31/20: Tylenol 650 mg BID, Tylenol 650 mg BID PRN, Senna S 1 tab BID PRN     Patient is met today today in her SNF where she is walked back to her memory care room for our visit.  She ambulates with me holding her hand and is not leaning one way or another (as she reportedly was doing in the past) but also is not 100% stable on her feet.  She uses no AE and likely would not remember to if given one.  She denies pain while ambulating with me.   Once we are in her room she denies pain, shortness  of breath, CP, HA, lightheadedness, heartburn, upset stomach, constipation.  It is noted she is likely a poor historian d/t dementia. Patient reports her sleeps ig ood but endorses no appetite.    Nursing reports patient has settled into SNF well, is no longer crying anymore and not as anxious.        ALLERGIES:Patient has no known allergies.  PAST MEDICAL HISTORY:   has a past medical history of Dementia (H), Hypertension, and Mixed hyperlipidemia. She also has no past medical history of Arthritis, Cancer (H), Cerebral infarction (H), Congestive heart failure (H), Congestive heart failure, unspecified, COPD (chronic obstructive pulmonary disease) (H), Depressive disorder, Diabetes (H), Heart disease, History of blood transfusion, Thyroid disease, or Uncomplicated asthma.  PAST SURGICAL HISTORY:   has a past surgical history that includes surgical history of -  (grade school); hysterectomy, pap no longer indicated (1995); surgical history of -  (2011); appendectomy; and colonoscopy.  FAMILY HISTORY: family history includes Alzheimer Disease in her paternal grandmother; Breast Cancer in her sister and sister; Cancer in her father; Diabetes in her maternal grandmother and mother; Heart Disease in her mother; Hypertension in her mother; Prostate Cancer in her brother, brother, father, and paternal grandfather; Respiratory in her father.  SOCIAL HISTORY:  reports that she quit smoking about 39 years ago. Her smoking use included cigarettes. She has a 5.50 pack-year smoking history. She has never used smokeless tobacco. She reports current alcohol use. She reports that she does not use drugs.    MEDICATIONS:  Current Outpatient Medications   Medication Sig Dispense Refill     OLANZapine (ZYPREXA) 2.5 MG tablet Take 1 tablet (2.5 mg) by mouth 2 times daily       acetaminophen (TYLENOL) 325 MG tablet Take 2 tablets (650 mg) by mouth 2 times daily. May also take 2 tablets (650 mg) 2 times daily as needed for mild pain.        cyanocobalamin (VITAMIN B-12) 100 MCG tablet Take 100 mcg by mouth daily       divalproex sodium delayed-release (DEPAKOTE) 250 MG DR tablet Take 1 tablet (250 mg) by mouth 3 times daily       escitalopram (LEXAPRO) 20 MG tablet Take 20 mg by mouth At Bedtime       metoprolol succinate ER (TOPROL-XL) 50 MG 24 hr tablet Take 1 tablet (50 mg) by mouth daily 90 tablet 3     mirtazapine (REMERON) 15 MG tablet Take 1 tablet (15 mg) by mouth At Bedtime       SENNA-docusate sodium (SENNA S) 8.6-50 MG tablet Take 1 tablet by mouth 2 times daily as needed (constipation)       simvastatin (ZOCOR) 10 MG tablet TAKE ONE TABLET BY MOUTH AT BEDTIME 90 tablet 3     Vitamin D, Cholecalciferol, 25 MCG (1000 UT) CAPS Take 1,000 Units by mouth daily       Case Management:  I have reviewed the care plan and MDS and do agree with the plan. Patient's desire to return to the community is not assessible due to cognitive impairment. Information reviewed:  Medications, vital signs, orders, and nursing notes.    ROS:  Limited secondary to cognitive impairment but today pt reports 10 point ROS of systems including Constitutional, Eyes, Respiratory, Cardiovascular, Gastroenterology, Genitourinary, Integumentary, Musculoskeletal, Psychiatric were all negative except for pertinent positives noted in my HPI.    Vitals:  /79   Pulse 72   Temp 97.8  F (36.6  C)   Resp 20   Wt 67.4 kg (148 lb 8 oz)   SpO2 96%   BMI 27.16 kg/m    Body mass index is 27.16 kg/m .  Exam:  GENERAL APPEARANCE:  Alert, in no distress, smiling and pleasantly confused woman   RESP:  respiratory effort and palpation of chest normal, auscultation of lungs clear , no respiratory distress  CV:  Palpation and auscultation of heart done , rate and rhythm regular, no murmur, no LE peripheral edema  ABDOMEN:  normal bowel sounds, soft, nontender, no hepatosplenomegaly or other masses  M/S:   Gait and station ambulatory without AE but not entirely stable on her feet,  Digits and nails with arthritic changes, reduced muscle mass  SKIN:  Inspection and Palpation of skin and subcutaneous tissue intact, no rashes appreciated  PSYCH:  insight and judgement, memory with impairment, affect and mood normal, follows commands readily but forgets quickly       Lab/Diagnostic data:   Labs done while at [facility]illed Nursing Facility done by [facility]. Pertinent results are: reviewed in SNF EHR    ASSESSMENT/PLAN  Late onset Alzheimer's disease with behavioral disturbance (H)  Delusions (H)   Anxiety  Prev living at home with her  but hbut had increase in confusion and agitation, difficult to redirect so moved to SNF for increased care needs  On VItamin B12 100 mcg daily (8/23/20 - level was 843)  She is also on Depakote 250 mg TID, escitalopram 20 mg daily, mirtazapine 15 mg q HS, olanzapine 2.5 mg TID     Nursing reports patient has settle in better, no longer crying or as anxious.   Recent SNF PHQ9 - 3        PLAN:   - Continue Vitamin B12  - continue Depakote 250 mg TID   - continue escitalopram 20 mg daily (known recommendation not >10 mg in elderly)  - continue mirtazapine but monitor for need   - GDR olanzapine 2.5 mg to BID and monitor for ability to further GDR as daily only covered by insurance   - nursing for supportive cares   - Family goal is comfort     Essential hypertension  Hyperlipidemia LDL goal <130  On Toprol XL 50 mg daily, simvastatin 10 mg q HS  BPs 120-130s/70-80s  HRs 70-80s  Patient denies CP, HA, lightheadedness      PLAN:   - Toprol XL 50 mg daily  - simvastatin 10 mg q HS  - BP goals are  <140/90 mm Hg.This is higher than ACC and AHA recommendations due to risk for hypotension, risk of dizziness and falls and risk of tissue/cerebral hypoperfusion. Patient is stable with current plan of care and routine assessment.       Pain  On Tylenol 650 mg  BID and 650 mg BID PRN  Patient reports no pain ( may be poor historian)      PLAN:   - Tylenol 650 mg po  BID  - Tylenol 650 mg BID PRN  - no changes as decreasing olanzapine and best to adjust one med at a time for memory care patient who may be poor  of reason for agiation   - monitor for pain, behavior changes     Orders written by provider at facility  1. Decrease olanzapine to 2.5 mg BID      Electronically signed by:  AARON Aggarwal CNP                Sincerely,        AARON Aggarwal CNP

## 2020-10-12 ENCOUNTER — RECORDS - HEALTHEAST (OUTPATIENT)
Dept: LAB | Facility: CLINIC | Age: 70
End: 2020-10-12

## 2020-10-13 ENCOUNTER — COMMUNICATION - HEALTHEAST (OUTPATIENT)
Dept: SCHEDULING | Facility: CLINIC | Age: 70
End: 2020-10-13

## 2020-10-13 LAB
ANION GAP SERPL CALCULATED.3IONS-SCNC: 6 MMOL/L (ref 5–18)
BUN SERPL-MCNC: 23 MG/DL (ref 8–28)
CALCIUM SERPL-MCNC: 8.6 MG/DL (ref 8.5–10.5)
CHLORIDE BLD-SCNC: 107 MMOL/L (ref 98–107)
CO2 SERPL-SCNC: 31 MMOL/L (ref 22–31)
CREAT SERPL-MCNC: 0.72 MG/DL (ref 0.6–1.1)
GFR SERPL CREATININE-BSD FRML MDRD: >60 ML/MIN/1.73M2
GLUCOSE BLD-MCNC: 81 MG/DL (ref 70–125)
POTASSIUM BLD-SCNC: 4.4 MMOL/L (ref 3.5–5)
SODIUM SERPL-SCNC: 144 MMOL/L (ref 136–145)

## 2020-10-19 NOTE — PROGRESS NOTES
[facility]  [place] Medical Record Number:  [id number]  Place of Service where encounter took place:  [facility] ([facility]) [[id number]]  Chief Complaint   Patient presents with     RECHECK       HPI:    Shari Graham  is a 70 year old (1950), who is being seen today for an episodic care visit.  HPI information obtained from: facility chart records, facility staff, patient report and [facility] chart review. Today's concern is:     Late onset Alzheimer's disease with behavioral disturbance (H)  Anxiety  Delusions (H)     Patient is a 69 year old woman with PMH including progressive Alzheimer's Dementia, HTN, HLD who has been seen at [facility] ED on 8/20/20 and [facility] ED/[facility] 8/22-24 for increased agitation, confusion.  Head CT was neg, no evidence of pyuria, CHEST XRAY clear, no metabolic etiology found.  It was felt her altered mental status was 2/2 progressing dementia and she was moved to [facility] memory care unit for increased care needs.   Family's goals (per nursing) is comfort.       Recent Medication Changes:  - 8/27/20: Olanzapine 2.5 mg TID, Olanzapine 2.5 mg q HS PRN x 14 days, discontinue Seroquel , Vitamin D 1000 units daily.  - 8/31/20: Tylenol 650 mg BID, Tylenol 650 mg BID PRN, Senna S 1 tab BID PRN   - 10/8/20: Decrease olanzapine to 2.5 mg BID    Nursing reporting that patient is tolerating decrease in olanzapine well with being more alert, knowing where she is more, but is more weepy/sad because of this. Nursing reports patient does look for her parents and sister (she was very close to her sister, per her family's report) and is anxious when she is unable to find them, ronaldo her sister.  Noted patient does sundown with afternoon/evenings worse than daytime.     Patient is met today in the common area of the memory care unit.  She is smiling, calm, pleasant, so happy to see me.  She is patient when another resident comes up to us and does not leave.  She  denies any symptoms although noted she may be a poor historian.  She is A&O x 1 (self only).      Past Medical and Surgical History reviewed in Epic today.    MEDICATIONS:  Current Outpatient Medications   Medication Sig Dispense Refill     OLANZapine (ZYPREXA) 2.5 MG tablet Take 1 tablet (2.5 mg) by mouth daily at 4pm       acetaminophen (TYLENOL) 325 MG tablet Take 2 tablets (650 mg) by mouth 2 times daily. May also take 2 tablets (650 mg) 2 times daily as needed for mild pain.       cyanocobalamin (VITAMIN B-12) 100 MCG tablet Take 100 mcg by mouth daily       divalproex sodium delayed-release (DEPAKOTE) 250 MG DR tablet Take 1 tablet (250 mg) by mouth 3 times daily       escitalopram (LEXAPRO) 20 MG tablet Take 20 mg by mouth At Bedtime       metoprolol succinate ER (TOPROL-XL) 50 MG 24 hr tablet Take 1 tablet (50 mg) by mouth daily 90 tablet 3     mirtazapine (REMERON) 15 MG tablet Take 1 tablet (15 mg) by mouth At Bedtime       SENNA-docusate sodium (SENNA S) 8.6-50 MG tablet Take 1 tablet by mouth 2 times daily as needed (constipation)       simvastatin (ZOCOR) 10 MG tablet TAKE ONE TABLET BY MOUTH AT BEDTIME 90 tablet 3     Vitamin D, Cholecalciferol, 25 MCG (1000 UT) CAPS Take 1,000 Units by mouth daily       REVIEW OF SYSTEMS:  Limited secondary to cognitive impairment but today pt reports 10 point ROS of systems including Constitutional, Eyes, Respiratory, Cardiovascular, Gastroenterology, Genitourinary, Integumentary, Musculoskeletal, Psychiatric were all negative except for pertinent positives noted in my HPI.    Objective:  /79   Pulse 73   Temp 98.4  F (36.9  C)   Resp 20   Wt 67.4 kg (148 lb 8 oz)   SpO2 95%   BMI 27.16 kg/m    Exam:  GENERAL APPEARANCE:  Alert, in no distress, pleasantly confused per baseline  RESP:  respiratory effort and palpation of chest normal, auscultation of lungs clear , no respiratory distress  CV:  Palpation and auscultation of heart done , rate and rhythm  regular, no murmur, no LE peripheral edema  ABDOMEN:  normal bowel sounds, soft, nontender, no hepatosplenomegaly or other masses  M/S:   Gait and station ambulatory with unsteady gait, Digits and nails with arthritic changes, reduced muscle mass  SKIN:  Inspection and Palpation of skin and subcutaneous tissue intact  PSYCH:  insight and judgement, memory with impairment, affect and mood normal, follows commands readily  But forgets very quickly       Labs:   Labs done while at [facility] done by [facility] lab. Pertinent results are: reviewed    ASSESSMENT/PLAN:  Late onset Alzheimer's disease with behavioral disturbance (H)  Delusions (H)   Anxiety  Prev living at home with her  but hbut had increase in confusion and agitation, difficult to redirect so moved to SNF for increased care needs  On VItamin B12 100 mcg daily (8/23/20 - level was 843)  She is also on Depakote 250 mg TID, escitalopram 20 mg daily, mirtazapine 15 mg q HS, olanzapine 2.5 mg BID (insurance only now covering daily)      Recent SNF PHQ9 - 3    Patient reports she is sleeping well and has a good appetite, is in good spirits (likely a poor historian although nursing does endorse good sleep and a good appetite as well).   Nursing reporting that patient is tolerating decrease in olanzapine well with being more alert, knowing where she is more, but is more weepy/sad because of this. Nursing reports patient does look for her parents and sister (she was very close to her sister, per her family's report) and is anxious when she is unable to find them, ronaldo her sister.  Noted patient does sundown with afternoon/evenings worse than daytime.     PLAN:  - will trial decrease of olanzapine down to daily 2.5 mg at 1600 (around when sundowning happening).    - if patient has increase in delusions, can try Seroquel as insurance may cover this better.  - if increase in depression, anxiety, could trial sertraline and taper off  escitalopram.    - goal would be to taper depakote next if patient tolerates GDR of olanzapine well.   - consult ACP       Orders written by provider at facility  1. In house psych consult to eval & treat  2. Decrease olanzapine to 2.5 mg daily at 1600.    The health plan new enrollment has happened. I have reviewed the  MDS, the preventative needs,  and facility care plan. The level of care is appropriate. I have reviewed the code status/advanced directives.       Electronically signed by:  AARON Aggarwal CNP

## 2020-10-20 ENCOUNTER — NURSING HOME VISIT (OUTPATIENT)
Dept: GERIATRICS | Facility: CLINIC | Age: 70
End: 2020-10-20
Payer: COMMERCIAL

## 2020-10-20 VITALS
HEART RATE: 73 BPM | BODY MASS INDEX: 27.16 KG/M2 | OXYGEN SATURATION: 95 % | DIASTOLIC BLOOD PRESSURE: 79 MMHG | WEIGHT: 148.5 LBS | RESPIRATION RATE: 20 BRPM | TEMPERATURE: 98.4 F | SYSTOLIC BLOOD PRESSURE: 119 MMHG

## 2020-10-20 DIAGNOSIS — G30.1 LATE ONSET ALZHEIMER'S DISEASE WITH BEHAVIORAL DISTURBANCE (H): Primary | ICD-10-CM

## 2020-10-20 DIAGNOSIS — F02.818 LATE ONSET ALZHEIMER'S DISEASE WITH BEHAVIORAL DISTURBANCE (H): Primary | ICD-10-CM

## 2020-10-20 DIAGNOSIS — F41.9 ANXIETY: ICD-10-CM

## 2020-10-20 DIAGNOSIS — F22 DELUSIONS (H): ICD-10-CM

## 2020-10-20 PROCEDURE — 99309 SBSQ NF CARE MODERATE MDM 30: CPT | Performed by: NURSE PRACTITIONER

## 2020-10-20 RX ORDER — OLANZAPINE 2.5 MG/1
2.5 TABLET, FILM COATED ORAL
Start: 2020-10-20 | End: 2021-01-19

## 2020-10-20 NOTE — LETTER
10/20/2020        RE: Shari Graham  [facility]  [address] [address]        [facility]  [place] [facility] Record Number:  [id number]  Place of Service where encounter took place:  [facility] ([facility]) [[id number]]  Chief Complaint   Patient presents with     RECHECK       HPI:    Shari Graham  is a 70 year old (1950), who is being seen today for an episodic care visit.  HPI information obtained from: facility chart records, facility staff, patient report and [facility] chart review. Today's concern is:     Late onset Alzheimer's disease with behavioral disturbance (H)  Anxiety  Delusions (H)     Patient is a 69 year old woman with PMH including progressive Alzheimer's Dementia, HTN, HLD who has been seen at [facility] ED on 8/20/20 and [facility] ED/[facility] 8/22-24 for increased agitation, confusion.  Head CT was neg, no evidence of pyuria, CHEST XRAY clear, no metabolic etiology found.  It was felt her altered mental status was 2/2 progressing dementia and she was moved to [facility] memory care unit for increased care needs.   Family's goals (per nursing) is comfort.       Recent Medication Changes:  - 8/27/20: Olanzapine 2.5 mg TID, Olanzapine 2.5 mg q HS PRN x 14 days, discontinue Seroquel , Vitamin D 1000 units daily.  - 8/31/20: Tylenol 650 mg BID, Tylenol 650 mg BID PRN, Senna S 1 tab BID PRN   - 10/8/20: Decrease olanzapine to 2.5 mg BID    Nursing reporting that patient is tolerating decrease in olanzapine well with being more alert, knowing where she is more, but is more weepy/sad because of this. Nursing reports patient does look for her parents and sister (she was very close to her sister, per her family's report) and is anxious when she is unable to find them, ronaldo her sister.  Noted patient does sundown with afternoon/evenings worse than daytime.     Patient is met today in the common area of the memory care unit.  She is smiling, calm,  pleasant, so happy to see me.  She is patient when another resident comes up to us and does not leave.  She denies any symptoms although noted she may be a poor historian.  She is A&O x 1 (self only).      Past Medical and Surgical History reviewed in Epic today.    MEDICATIONS:  Current Outpatient Medications   Medication Sig Dispense Refill     OLANZapine (ZYPREXA) 2.5 MG tablet Take 1 tablet (2.5 mg) by mouth daily at 4pm       acetaminophen (TYLENOL) 325 MG tablet Take 2 tablets (650 mg) by mouth 2 times daily. May also take 2 tablets (650 mg) 2 times daily as needed for mild pain.       cyanocobalamin (VITAMIN B-12) 100 MCG tablet Take 100 mcg by mouth daily       divalproex sodium delayed-release (DEPAKOTE) 250 MG DR tablet Take 1 tablet (250 mg) by mouth 3 times daily       escitalopram (LEXAPRO) 20 MG tablet Take 20 mg by mouth At Bedtime       metoprolol succinate ER (TOPROL-XL) 50 MG 24 hr tablet Take 1 tablet (50 mg) by mouth daily 90 tablet 3     mirtazapine (REMERON) 15 MG tablet Take 1 tablet (15 mg) by mouth At Bedtime       SENNA-docusate sodium (SENNA S) 8.6-50 MG tablet Take 1 tablet by mouth 2 times daily as needed (constipation)       simvastatin (ZOCOR) 10 MG tablet TAKE ONE TABLET BY MOUTH AT BEDTIME 90 tablet 3     Vitamin D, Cholecalciferol, 25 MCG (1000 UT) CAPS Take 1,000 Units by mouth daily       REVIEW OF SYSTEMS:  Limited secondary to cognitive impairment but today pt reports 10 point ROS of systems including Constitutional, Eyes, Respiratory, Cardiovascular, Gastroenterology, Genitourinary, Integumentary, Musculoskeletal, Psychiatric were all negative except for pertinent positives noted in my HPI.    Objective:  /79   Pulse 73   Temp 98.4  F (36.9  C)   Resp 20   Wt 67.4 kg (148 lb 8 oz)   SpO2 95%   BMI 27.16 kg/m    Exam:  GENERAL APPEARANCE:  Alert, in no distress, pleasantly confused per baseline  RESP:  respiratory effort and palpation of chest normal, auscultation of  lungs clear , no respiratory distress  CV:  Palpation and auscultation of heart done , rate and rhythm regular, no murmur, no LE peripheral edema  ABDOMEN:  normal bowel sounds, soft, nontender, no hepatosplenomegaly or other masses  M/S:   Gait and station ambulatory with unsteady gait, Digits and nails with arthritic changes, reduced muscle mass  SKIN:  Inspection and Palpation of skin and subcutaneous tissue intact  PSYCH:  insight and judgement, memory with impairment, affect and mood normal, follows commands readily  But forgets very quickly       Labs:   Labs done while at [facility] done by [employer] lab. Pertinent results are: reviewed    ASSESSMENT/PLAN:  Late onset Alzheimer's disease with behavioral disturbance (H)  Delusions (H)   Anxiety  Prev living at home with her  but hbut had increase in confusion and agitation, difficult to redirect so moved to SNF for increased care needs  On VItamin B12 100 mcg daily (8/23/20 - level was 843)  She is also on Depakote 250 mg TID, escitalopram 20 mg daily, mirtazapine 15 mg q HS, olanzapine 2.5 mg BID (insurance only now covering daily)      Recent SNF PHQ9 - 3    Patient reports she is sleeping well and has a good appetite, is in good spirits (likely a poor historian although nursing does endorse good sleep and a good appetite as well).   Nursing reporting that patient is tolerating decrease in olanzapine well with being more alert, knowing where she is more, but is more weepy/sad because of this. Nursing reports patient does look for her parents and sister (she was very close to her sister, per her family's report) and is anxious when she is unable to find them, ronaldo her sister.  Noted patient does sundown with afternoon/evenings worse than daytime.     PLAN:  - will trial decrease of olanzapine down to daily 2.5 mg at 1600 (around when sundowning happening).    - if patient has increase in delusions, can try Seroquel as insurance may  cover this better.  - if increase in depression, anxiety, could trial sertraline and taper off escitalopram.    - goal would be to taper depakote next if patient tolerates GDR of olanzapine well.   - consult ACP       Orders written by provider at facility  1. In house psych consult to eval & treat  2. Decrease olanzapine to 2.5 mg daily at 1600.      Electronically signed by:  AARON Aggarwal CNP                 Sincerely,        AARON Aggarwal CNP

## 2020-11-24 ENCOUNTER — NURSING HOME VISIT (OUTPATIENT)
Dept: GERIATRICS | Facility: CLINIC | Age: 70
End: 2020-11-24
Payer: COMMERCIAL

## 2020-11-24 VITALS
DIASTOLIC BLOOD PRESSURE: 69 MMHG | WEIGHT: 148.2 LBS | OXYGEN SATURATION: 97 % | SYSTOLIC BLOOD PRESSURE: 98 MMHG | HEIGHT: 62 IN | TEMPERATURE: 97.3 F | RESPIRATION RATE: 16 BRPM | BODY MASS INDEX: 27.27 KG/M2 | HEART RATE: 71 BPM

## 2020-11-24 DIAGNOSIS — G30.9 ALZHEIMER'S DEMENTIA WITHOUT BEHAVIORAL DISTURBANCE, UNSPECIFIED TIMING OF DEMENTIA ONSET: Primary | ICD-10-CM

## 2020-11-24 DIAGNOSIS — I10 HTN, GOAL BELOW 140/90: ICD-10-CM

## 2020-11-24 DIAGNOSIS — F02.80 ALZHEIMER'S DEMENTIA WITHOUT BEHAVIORAL DISTURBANCE, UNSPECIFIED TIMING OF DEMENTIA ONSET: Primary | ICD-10-CM

## 2020-11-24 PROCEDURE — 99308 SBSQ NF CARE LOW MDM 20: CPT | Performed by: INTERNAL MEDICINE

## 2020-11-24 ASSESSMENT — MIFFLIN-ST. JEOR: SCORE: 1140.48

## 2020-11-24 NOTE — PROGRESS NOTES
"[facility]  PHYSICIAN NOTE    Chief Complaint   Patient presents with     prison Regulatory       HPI:    Shari Graham is a 70 year old  (1950), who is being seen today for a federally mandated E/M visit at [facility] . Admitted to LTC in Aug 2020.     She has h/o advanced dementia previously living with her  with daughter Gracie very actively involved in her life. Significant sundowning and hallucinations with increased frailty/falls and increased care needs as well as anxiety with skin picking behavior. She had increased tearfulness and was \"inconsolable\" with difficulty redirecting her at home and was admitted to [facility] at the end of August before moving to [facility]. While hospitalized, CT head unremarkable for acute findings, B12 within normal limits and medications adjusted significantly including: (Adjustment of Depakote 500 mg at bedtime to 250 mg TID, new Remeron, new Olanzapine, new Seroquel PRN, discontinuation of Trazodone); continued on 20 mg Lexapro.    It did take some time, as expected, but she has thankfully settled in quite well. I spoke with Soraya, her RN and her aide today. Staff fell her medication regimen is stable, no behaviors, less anxiety. She has some sundowning behavior and at times has looked for her family, especially her sister. Her Olanzapine has been able to be decreased slowly from TID dosing to just once daily now in late afternoon. Plan in time is to continue GDR but staff feel right now meds should be maintained as is. Day nurse believes Soraya sleeps well as she gets no report otherwise from night shift. Aide mentions that they are going to change her barrier cream to a zinc based one as sometimes despite changing of her incontinence pads, she still has some perirectal irritation. In talking with Soraya today she was in common area by a fellow resident and needed help putting on her sweater (I " "helped her). She was cheerful and happy to see me (didn't know who I was but exclaimed happily, \"I haven't seen you in awhile\"!). She denied concerns or any pain. She cooperatively went with staff for pad change. She is on BID scheduled Tylenol with relief of musculoskeletal pain (when she had a lot of that with initial admission pacing) and hasn't needed to use her PRN additional Tylenol this month.    ALLERGIES: Patient has no known allergies.    Past Medical History:   Diagnosis Date     Dementia (H)      Hypertension      Mixed hyperlipidemia     Hyperlipidemia      CODE STATUS: DNR/I    MEDICATIONS: Reviewed and updated in Epic according to facility MAR  Current Outpatient Medications   Medication Sig Dispense Refill     acetaminophen (TYLENOL) 325 MG tablet Take 2 tablets (650 mg) by mouth 2 times daily. May also take 2 tablets (650 mg) 2 times daily as needed for mild pain.       cyanocobalamin (VITAMIN B-12) 100 MCG tablet Take 100 mcg by mouth daily       divalproex sodium delayed-release (DEPAKOTE) 250 MG DR tablet Take 1 tablet (250 mg) by mouth 3 times daily       escitalopram (LEXAPRO) 20 MG tablet Take 20 mg by mouth At Bedtime       metoprolol succinate ER (TOPROL-XL) 50 MG 24 hr tablet Take 1 tablet (50 mg) by mouth daily 90 tablet 3     mirtazapine (REMERON) 15 MG tablet Take 1 tablet (15 mg) by mouth At Bedtime       OLANZapine (ZYPREXA) 2.5 MG tablet Take 1 tablet (2.5 mg) by mouth daily at 4pm       SENNA-docusate sodium (SENNA S) 8.6-50 MG tablet Take 1 tablet by mouth 2 times daily as needed (constipation)       simvastatin (ZOCOR) 10 MG tablet TAKE ONE TABLET BY MOUTH AT BEDTIME 90 tablet 3     Vitamin D, Cholecalciferol, 25 MCG (1000 UT) CAPS Take 1,000 Units by mouth daily         ROS:  Limited secondary to cognitive impairment but today pt reports as above in HPI    Exam:  BP 98/69   Pulse 71   Temp 97.3  F (36.3  C)   Resp 16   Ht 1.575 m (5' 2\")   Wt 67.2 kg (148 lb 3.2 oz)   SpO2 " 97%   BMI 27.11 kg/m    NOTE, BP and HR on the low side as recorded above but most readings within more appropriate range (-130s/70-80s with HRs 60-90s)  Alert, cheerful, nicely groomed  Breathing non-labored no cough  Able to  on non-verbal cues in the room  Cooperative with staff and able to walk independently  Calm disposition, seems at ease    Lab/Diagnostic Data:    Negative serial COVID screens  BMP within normal limits October 2020    ASSESSMENT/PLAN:  Alzheimer's dementia without behavioral disturbance, unspecified timing of dementia onset (H)  She has settled in with time and dedicated care of nursing staff in supportive memory care unit setting  She is so much more comfortable appearing today than my visit two months ago when she was pacing and very anxious  She has been able to tolerate GDR of Olanzapine as well throughout this time which is great  Continue GDR of Olanzapine and then Depakote as able in time  May likely still need some antipsychotic in late afternoon to head off the sundowning  Weight down just a couple pounds and will continue to follow  Continue Lexapro for comfort in setting of h/o anxiety  Would not increase Remeron further than 15 mg as higher doses can be more stimulating for elderly folks  Tylenol has likely be helpful for comfort in setting of some musculoskeletal aches/pains  Encouraged staff to update us if any changes/concerns    HTN, goal below 140/90  Remains on Toprol which may likely be affecting HR more so than BP  NOTE, BP and HR on the low side as recorded above but most readings within more appropriate range (-130s/70-80s with HRs 60-90s)      Electronically signed by:  Lurdes Sinclair DO

## 2020-11-24 NOTE — LETTER
"    11/24/2020        RE: Shari Graham  [facility]  [address] [address]        [place] [facility]  PHYSICIAN NOTE    Chief Complaint   Patient presents with     long-term Regulatory       HPI:    Shari Graham is a 70 year old  (1950), who is being seen today for a federally mandated E/M visit at [facility] . Admitted to LT in Aug 2020.     She has h/o advanced dementia previously living with her  with daughter Gracie very actively involved in her life. Significant sundowning and hallucinations with increased frailty/falls and increased care needs as well as anxiety with skin picking behavior. She had increased tearfulness and was \"inconsolable\" with difficulty redirecting her at home and was admitted to [facility] at the end of August before moving to [facility]. While hospitalized, CT head unremarkable for acute findings, B12 within normal limits and medications adjusted significantly including: (Adjustment of Depakote 500 mg at bedtime to 250 mg TID, new Remeron, new Olanzapine, new Seroquel PRN, discontinuation of Trazodone); continued on 20 mg Lexapro.    It did take some time, as expected, but she has thankfully settled in quite well. I spoke with Soraya, her RN and her aide today. Staff fell her medication regimen is stable, no behaviors, less anxiety. She has some sundowning behavior and at times has looked for her family, especially her sister. Her Olanzapine has been able to be decreased slowly from TID dosing to just once daily now in late afternoon. Plan in time is to continue GDR but staff feel right now meds should be maintained as is. Day nurse believes Soraya sleeps well as she gets no report otherwise from night shift. Aide mentions that they are going to change her barrier cream to a zinc based one as sometimes despite changing of her incontinence pads, she still has some perirectal irritation. " "In talking with Soraya today she was in common area by a fellow resident and needed help putting on her sweater (I helped her). She was cheerful and happy to see me (didn't know who I was but exclaimed happily, \"I haven't seen you in awhile\"!). She denied concerns or any pain. She cooperatively went with staff for pad change. She is on BID scheduled Tylenol with relief of musculoskeletal pain (when she had a lot of that with initial admission pacing) and hasn't needed to use her PRN additional Tylenol this month.    ALLERGIES: Patient has no known allergies.    Past Medical History:   Diagnosis Date     Dementia (H)      Hypertension      Mixed hyperlipidemia     Hyperlipidemia      CODE STATUS: DNR/I    MEDICATIONS: Reviewed and updated in [facility] according to facility MAR  Current Outpatient Medications   Medication Sig Dispense Refill     acetaminophen (TYLENOL) 325 MG tablet Take 2 tablets (650 mg) by mouth 2 times daily. May also take 2 tablets (650 mg) 2 times daily as needed for mild pain.       cyanocobalamin (VITAMIN B-12) 100 MCG tablet Take 100 mcg by mouth daily       divalproex sodium delayed-release (DEPAKOTE) 250 MG DR tablet Take 1 tablet (250 mg) by mouth 3 times daily       escitalopram (LEXAPRO) 20 MG tablet Take 20 mg by mouth At Bedtime       metoprolol succinate ER (TOPROL-XL) 50 MG 24 hr tablet Take 1 tablet (50 mg) by mouth daily 90 tablet 3     mirtazapine (REMERON) 15 MG tablet Take 1 tablet (15 mg) by mouth At Bedtime       OLANZapine (ZYPREXA) 2.5 MG tablet Take 1 tablet (2.5 mg) by mouth daily at 4pm       SENNA-docusate sodium (SENNA S) 8.6-50 MG tablet Take 1 tablet by mouth 2 times daily as needed (constipation)       simvastatin (ZOCOR) 10 MG tablet TAKE ONE TABLET BY MOUTH AT BEDTIME 90 tablet 3     Vitamin D, Cholecalciferol, 25 MCG (1000 UT) CAPS Take 1,000 Units by mouth daily         ROS:  Limited secondary to cognitive impairment but today pt reports as above in HPI    Exam:  BP " "98/69   Pulse 71   Temp 97.3  F (36.3  C)   Resp 16   Ht 1.575 m (5' 2\")   Wt 67.2 kg (148 lb 3.2 oz)   SpO2 97%   BMI 27.11 kg/m    NOTE, BP and HR on the low side as recorded above but most readings within more appropriate range (-130s/70-80s with HRs 60-90s)  Alert, cheerful, nicely groomed  Breathing non-labored no cough  Able to  on non-verbal cues in the room  Cooperative with staff and able to walk independently  Calm disposition, seems at ease    Lab/Diagnostic Data:    Negative serial COVID screens  BMP within normal limits October 2020    ASSESSMENT/PLAN:  Alzheimer's dementia without behavioral disturbance, unspecified timing of dementia onset (H)  She has settled in with time and dedicated care of nursing staff in supportive memory care unit setting  She is so much more comfortable appearing today than my visit two months ago when she was pacing and very anxious  She has been able to tolerate GDR of Olanzapine as well throughout this time which is great  Continue GDR of Olanzapine and then Depakote as able in time  May likely still need some antipsychotic in late afternoon to head off the sundowning  Weight down just a couple pounds and will continue to follow  Continue Lexapro for comfort in setting of h/o anxiety  Would not increase Remeron further than 15 mg as higher doses can be more stimulating for elderly folks  Tylenol has likely be helpful for comfort in setting of some musculoskeletal aches/pains  Encouraged staff to update us if any changes/concerns    HTN, goal below 140/90  Remains on Toprol which may likely be affecting HR more so than BP  NOTE, BP and HR on the low side as recorded above but most readings within more appropriate range (-130s/70-80s with HRs 60-90s)      Electronically signed by:  Lurdes Sinclair, DO          Sincerely,        Lurdes Sinclair, DO    "

## 2020-12-31 NOTE — PROGRESS NOTES
"[place] [facility]  Chief Complaint   Patient presents with     skilled nursing Regulatory     [place] Medical Record Number:  [id number]  Place of Service where encounter took place:  [facility] (SNF) [[id number]]    HPI:    Shari Graham  is 70 year old (1950), who is being seen today for a federally mandated E/M visit.  HPI information obtained from: facility chart records, facility staff, patient report and [facility] chart review. Today's concerns are:     Alzheimer's dementia without behavioral disturbance, unspecified timing of dementia onset (H)  Anxiety  Delusions (H)  Essential hypertension  Hyperlipidemia LDL goal <130  Late onset Alzheimer's disease with behavioral disturbance (H)  HTN, goal below 140/90     Patient is a 69 year old woman with PMH including progressive Alzheimer's Dementia, HTN, HLD who has been seen at [facility] ED on 8/20/20 and [facility] ED/[facility] 8/22-24 for increased agitation, confusion.  Head CT was neg, no evidence of pyuria, CHEST XRAY clear, no metabolic etiology found.  It was felt her altered mental status was 2/2 progressing dementia and she was moved to [facility] memory care unit for increased care needs.   Family's goals (per nursing) is comfort.       Recent Medication Changes:  - 8/27/20: Olanzapine 2.5 mg TID, Olanzapine 2.5 mg q HS PRN x 14 days, discontinue Seroquel , Vitamin D 1000 units daily.  - 8/31/20: Tylenol 650 mg BID, Tylenol 650 mg BID PRN, Senna S 1 tab BID PRN   - 10/8/20: Decrease olanzapine to 2.5 mg BID  - 10/20/20: In house psych consult to eval & treat, Decrease olanzapine to 2.5 mg daily at 1600.  - 12/1/20: discontinue olanzapine    Patient is met today in the SNF where she is sitting and later up wandering around.  Nursing reports no behaviors and patient being very pleasant for the most part.  She does seem mildly anxious today but this may be not her normal.  She is a poor historian but answers \"no\" to all my " questions of symptoms.  She is tangential in speech and does not comprehend questioning when I ask her what she is worried about.        ALLERGIES:Patient has no known allergies.  PAST MEDICAL HISTORY:   has a past medical history of Dementia (H), Hypertension, and Mixed hyperlipidemia. She also has no past medical history of Arthritis, Cancer (H), Cerebral infarction (H), Congestive heart failure (H), Congestive heart failure, unspecified, COPD (chronic obstructive pulmonary disease) (H), Depressive disorder, Diabetes (H), Heart disease, History of blood transfusion, Thyroid disease, or Uncomplicated asthma.  PAST SURGICAL HISTORY:   has a past surgical history that includes surgical history of -  (grade school); hysterectomy, pap no longer indicated (1995); surgical history of -  (2011); appendectomy; and colonoscopy.  FAMILY HISTORY: family history includes Alzheimer Disease in her paternal grandmother; Breast Cancer in her sister and sister; Cancer in her father; Diabetes in her maternal grandmother and mother; Heart Disease in her mother; Hypertension in her mother; Prostate Cancer in her brother, brother, father, and paternal grandfather; Respiratory in her father.  SOCIAL HISTORY:  reports that she quit smoking about 39 years ago. Her smoking use included cigarettes. She has a 5.50 pack-year smoking history. She has never used smokeless tobacco. She reports current alcohol use. She reports that she does not use drugs.    MEDICATIONS:  Current Outpatient Medications   Medication Sig Dispense Refill     divalproex sodium delayed-release (DEPAKOTE) 125 MG DR tablet Take 1 tablet (125 mg) by mouth every morning AND 2 tablets (250 mg) 2 times daily.       metoprolol succinate ER (TOPROL-XL) 50 MG 24 hr tablet Take 0.5 tablets (25 mg) by mouth daily 90 tablet 3     acetaminophen (TYLENOL) 325 MG tablet Take 2 tablets (650 mg) by mouth 2 times daily. May also take 2 tablets (650 mg) 2 times daily as needed for mild  "pain.       cyanocobalamin (VITAMIN B-12) 100 MCG tablet Take 100 mcg by mouth daily       escitalopram (LEXAPRO) 20 MG tablet Take 20 mg by mouth At Bedtime       mirtazapine (REMERON) 15 MG tablet Take 1 tablet (15 mg) by mouth At Bedtime       OLANZapine (ZYPREXA) 2.5 MG tablet Take 1 tablet (2.5 mg) by mouth daily at 4pm       SENNA-docusate sodium (SENNA S) 8.6-50 MG tablet Take 1 tablet by mouth 2 times daily as needed (constipation)       simvastatin (ZOCOR) 10 MG tablet TAKE ONE TABLET BY MOUTH AT BEDTIME 90 tablet 3     Vitamin D, Cholecalciferol, 25 MCG (1000 UT) CAPS Take 1,000 Units by mouth daily         Case Management:  I have reviewed the care plan and MDS and do agree with the plan. Patient's desire to return to the community is not assessible due to cognitive impairment. Information reviewed:  Medications, vital signs, orders, and nursing notes.    ROS:  Limited secondary to cognitive impairment but today pt reports 10 point ROS of systems including Constitutional, Eyes, Respiratory, Cardiovascular, Gastroenterology, Genitourinary, Integumentary, Musculoskeletal, Psychiatric were all negative except for pertinent positives noted in my HPI.    Vitals:  /67   Pulse 62   Temp 98.3  F (36.8  C)   Resp 18   Ht 1.575 m (5' 2\")   Wt 66.7 kg (147 lb)   SpO2 97%   BMI 26.89 kg/m    Body mass index is 26.89 kg/m .  Exam:  GENERAL APPEARANCE:  Alert, in no distress, pleasant  RESP:  respiratory effort and palpation of chest normal, auscultation of lungs clear, no respiratory distress  CV:  Palpation and auscultation of heart done , rate and rhythm regular, no murmur, no LE peripheral edema  ABDOMEN:  normal bowel sounds, soft, nontender, no hepatosplenomegaly or other masses  M/S:   Gait and station ambulatory without AE, Digits and nails with arthritic changes, reduced muscle mass  SKIN:  Inspection and Palpation of skin and subcutaneous tissue intact  PSYCH:  insight and judgement, memory with " impairment, affect and mood normal, follows commands readily but forgets very quickly       Lab/Diagnostic data:   Labs done while at [facility] Nursing Facility done by [facility] lab. Pertinent results are: reviewed    ASSESSMENT/PLAN  Alzheimer's dementia without behavioral disturbance, unspecified timing of dementia onset (H)  Anxiety  Delusions (H)  Prev living at home with her  but hbut had increase in confusion and agitation, difficult to redirect so moved to SNF for increased care needs  On VItamin B12 100 mcg daily (8/23/20 - level was 843)  She is also on Depakote 250 mg TID, escitalopram 20 mg daily, mirtazapine 15 mg q HS  She has tolerated GDR of olanzapine very well.     Recent BIMS - 1  Recent PHQ9 - 4    Patient mildly anxious today but nursing reports this is not usually the case as she is pleasant.  She is wandering around the unit which is secured and safe for her to do so.      PLAN:  - GDR Depakote while monitoring for behaviors or worsened anxiety.   - continue escitalopram 20 mg daily  - continue Mirtazapine 15 mg q HS at this time  - nursing for supportive cares     Essential hypertension  Hyperlipidemia LDL goal <130  Patient is on simvastatin 10 mg daily for HLD, Toprol XL 50 mg daily for HTN  BPs 100-129/60-70s  HRs 62-91  Patient denies CP, HA< lightheadedness (but noted to likely be a poor historian)     PLAN:  - decrease Toprol XL to 25 mg daily  - BP goals are ~130 -165/60 -90 mmHg.This is higher than ACC and AHA recommendations due to risk for hypotension, risk of dizziness and falls and risk of tissue/cerebral hypoperfusion.     Orders written by provider at facility  1. Decrease Toprol XL to 25 mg po daily  2. Decrease Depakote to 125 mg q AM, 250 mg q afternoon and evening.         Electronically signed by:  AARON Aggarwal CNP

## 2021-01-04 ENCOUNTER — NURSING HOME VISIT (OUTPATIENT)
Dept: GERIATRICS | Facility: CLINIC | Age: 71
End: 2021-01-04
Payer: COMMERCIAL

## 2021-01-04 VITALS
HEIGHT: 62 IN | TEMPERATURE: 98.3 F | SYSTOLIC BLOOD PRESSURE: 100 MMHG | BODY MASS INDEX: 27.05 KG/M2 | WEIGHT: 147 LBS | OXYGEN SATURATION: 97 % | DIASTOLIC BLOOD PRESSURE: 67 MMHG | HEART RATE: 62 BPM | RESPIRATION RATE: 18 BRPM

## 2021-01-04 DIAGNOSIS — F22 DELUSIONS (H): ICD-10-CM

## 2021-01-04 DIAGNOSIS — F41.9 ANXIETY: ICD-10-CM

## 2021-01-04 DIAGNOSIS — G30.9 ALZHEIMER'S DEMENTIA WITHOUT BEHAVIORAL DISTURBANCE, UNSPECIFIED TIMING OF DEMENTIA ONSET: Primary | ICD-10-CM

## 2021-01-04 DIAGNOSIS — F02.80 ALZHEIMER'S DEMENTIA WITHOUT BEHAVIORAL DISTURBANCE, UNSPECIFIED TIMING OF DEMENTIA ONSET: Primary | ICD-10-CM

## 2021-01-04 DIAGNOSIS — F02.818 LATE ONSET ALZHEIMER'S DISEASE WITH BEHAVIORAL DISTURBANCE (H): ICD-10-CM

## 2021-01-04 DIAGNOSIS — E78.5 HYPERLIPIDEMIA LDL GOAL <130: ICD-10-CM

## 2021-01-04 DIAGNOSIS — G30.1 LATE ONSET ALZHEIMER'S DISEASE WITH BEHAVIORAL DISTURBANCE (H): ICD-10-CM

## 2021-01-04 DIAGNOSIS — I10 ESSENTIAL HYPERTENSION: ICD-10-CM

## 2021-01-04 DIAGNOSIS — I10 HTN, GOAL BELOW 140/90: ICD-10-CM

## 2021-01-04 PROCEDURE — 99309 SBSQ NF CARE MODERATE MDM 30: CPT | Performed by: NURSE PRACTITIONER

## 2021-01-04 RX ORDER — METOPROLOL SUCCINATE 50 MG/1
25 TABLET, EXTENDED RELEASE ORAL DAILY
Qty: 90 TABLET | Refills: 3
Start: 2021-01-04 | End: 2021-07-09

## 2021-01-04 RX ORDER — DIVALPROEX SODIUM 125 MG/1
TABLET, DELAYED RELEASE ORAL
Start: 2021-01-04 | End: 2021-01-18

## 2021-01-04 ASSESSMENT — MIFFLIN-ST. JEOR: SCORE: 1135.04

## 2021-01-04 NOTE — LETTER
1/4/2021        RE: Shari Graham  [facility]  [address] [address]        [place] [facility]  Chief Complaint   Patient presents with     shelter Regulatory     [place] Medical Record Number:  [id number]  Place of Service where encounter took place:  [facility] (SNF) [[id number]]    HPI:    Shari Graham  is 70 year old (1950), who is being seen today for a federally mandated E/M visit.  HPI information obtained from: facility chart records, facility staff, patient report and [facility] chart review. Today's concerns are:     Alzheimer's dementia without behavioral disturbance, unspecified timing of dementia onset (H)  Anxiety  Delusions (H)  Essential hypertension  Hyperlipidemia LDL goal <130  Late onset Alzheimer's disease with behavioral disturbance (H)  HTN, goal below 140/90     Patient is a 69 year old woman with PMH including progressive Alzheimer's Dementia, HTN, HLD who has been seen at [facility] ED on 8/20/20 and [facility] ED/Hospital 8/22-24 for increased agitation, confusion.  Head CT was neg, no evidence of pyuria, CHEST XRAY clear, no metabolic etiology found.  It was felt her altered mental status was 2/2 progressing dementia and she was moved to [facility] memory care unit for increased care needs.   Family's goals (per nursing) is comfort.       Recent Medication Changes:  - 8/27/20: Olanzapine 2.5 mg TID, Olanzapine 2.5 mg q HS PRN x 14 days, discontinue Seroquel , Vitamin D 1000 units daily.  - 8/31/20: Tylenol 650 mg BID, Tylenol 650 mg BID PRN, Senna S 1 tab BID PRN   - 10/8/20: Decrease olanzapine to 2.5 mg BID  - 10/20/20: In house psych consult to eval & treat, Decrease olanzapine to 2.5 mg daily at 1600.  - 12/1/20: discontinue olanzapine    Patient is met today in the SNF where she is sitting and later up wandering around.  Nursing reports no behaviors and patient being very pleasant for the most part.  She does seem  "mildly anxious today but this may be not her normal.  She is a poor historian but answers \"no\" to all my questions of symptoms.  She is tangential in speech and does not comprehend questioning when I ask her what she is worried about.        ALLERGIES:Patient has no known allergies.  PAST MEDICAL HISTORY:   has a past medical history of Dementia (H), Hypertension, and Mixed hyperlipidemia. She also has no past medical history of Arthritis, Cancer (H), Cerebral infarction (H), Congestive heart failure (H), Congestive heart failure, unspecified, COPD (chronic obstructive pulmonary disease) (H), Depressive disorder, Diabetes (H), Heart disease, History of blood transfusion, Thyroid disease, or Uncomplicated asthma.  PAST SURGICAL HISTORY:   has a past surgical history that includes surgical history of -  (grade school); hysterectomy, pap no longer indicated (1995); surgical history of -  (2011); appendectomy; and colonoscopy.  FAMILY HISTORY: family history includes Alzheimer Disease in her paternal grandmother; Breast Cancer in her sister and sister; Cancer in her father; Diabetes in her maternal grandmother and mother; Heart Disease in her mother; Hypertension in her mother; Prostate Cancer in her brother, brother, father, and paternal grandfather; Respiratory in her father.  SOCIAL HISTORY:  reports that she quit smoking about 39 years ago. Her smoking use included cigarettes. She has a 5.50 pack-year smoking history. She has never used smokeless tobacco. She reports current alcohol use. She reports that she does not use drugs.    MEDICATIONS:  Current Outpatient Medications   Medication Sig Dispense Refill     divalproex sodium delayed-release (DEPAKOTE) 125 MG DR tablet Take 1 tablet (125 mg) by mouth every morning AND 2 tablets (250 mg) 2 times daily.       metoprolol succinate ER (TOPROL-XL) 50 MG 24 hr tablet Take 0.5 tablets (25 mg) by mouth daily 90 tablet 3     acetaminophen (TYLENOL) 325 MG tablet Take 2 " "tablets (650 mg) by mouth 2 times daily. May also take 2 tablets (650 mg) 2 times daily as needed for mild pain.       cyanocobalamin (VITAMIN B-12) 100 MCG tablet Take 100 mcg by mouth daily       escitalopram (LEXAPRO) 20 MG tablet Take 20 mg by mouth At Bedtime       mirtazapine (REMERON) 15 MG tablet Take 1 tablet (15 mg) by mouth At Bedtime       OLANZapine (ZYPREXA) 2.5 MG tablet Take 1 tablet (2.5 mg) by mouth daily at 4pm       SENNA-docusate sodium (SENNA S) 8.6-50 MG tablet Take 1 tablet by mouth 2 times daily as needed (constipation)       simvastatin (ZOCOR) 10 MG tablet TAKE ONE TABLET BY MOUTH AT BEDTIME 90 tablet 3     Vitamin D, Cholecalciferol, 25 MCG (1000 UT) CAPS Take 1,000 Units by mouth daily         Case Management:  I have reviewed the care plan and MDS and do agree with the plan. Patient's desire to return to the community is not assessible due to cognitive impairment. Information reviewed:  Medications, vital signs, orders, and nursing notes.    ROS:  Limited secondary to cognitive impairment but today pt reports 10 point ROS of systems including Constitutional, Eyes, Respiratory, Cardiovascular, Gastroenterology, Genitourinary, Integumentary, Musculoskeletal, Psychiatric were all negative except for pertinent positives noted in my HPI.    Vitals:  /67   Pulse 62   Temp 98.3  F (36.8  C)   Resp 18   Ht 1.575 m (5' 2\")   Wt 66.7 kg (147 lb)   SpO2 97%   BMI 26.89 kg/m    Body mass index is 26.89 kg/m .  Exam:  GENERAL APPEARANCE:  Alert, in no distress, pleasant  RESP:  respiratory effort and palpation of chest normal, auscultation of lungs clear, no respiratory distress  CV:  Palpation and auscultation of heart done , rate and rhythm regular, no murmur, no LE peripheral edema  ABDOMEN:  normal bowel sounds, soft, nontender, no hepatosplenomegaly or other masses  M/S:   Gait and station ambulatory without AE, Digits and nails with arthritic changes, reduced muscle mass  SKIN:  " Inspection and Palpation of skin and subcutaneous tissue intact  PSYCH:  insight and judgement, memory with impairment, affect and mood normal, follows commands readily but forgets very quickly       Lab/Diagnostic data:   Labs done while at Skilled Nursing Facility done by [facility] [employer] lab. Pertinent results are: reviewed    ASSESSMENT/PLAN  Alzheimer's dementia without behavioral disturbance, unspecified timing of dementia onset (H)  Anxiety  Delusions (H)  Prev living at home with her  but hbut had increase in confusion and agitation, difficult to redirect so moved to SNF for increased care needs  On VItamin B12 100 mcg daily (8/23/20 - level was 843)  She is also on Depakote 250 mg TID, escitalopram 20 mg daily, mirtazapine 15 mg q HS  She has tolerated GDR of olanzapine very well.     Recent BIMS - 1  Recent PHQ9 - 4    Patient mildly anxious today but nursing reports this is not usually the case as she is pleasant.  She is wandering around the unit which is secured and safe for her to do so.      PLAN:  - GDR Depakote while monitoring for behaviors or worsened anxiety.   - continue escitalopram 20 mg daily  - continue Mirtazapine 15 mg q HS at this time  - nursing for supportive cares     Essential hypertension  Hyperlipidemia LDL goal <130  Patient is on simvastatin 10 mg daily for HLD, Toprol XL 50 mg daily for HTN  BPs 100-129/60-70s  HRs 62-91  Patient denies CP, HA< lightheadedness (but noted to likely be a poor historian)     PLAN:  - decrease Toprol XL to 25 mg daily  - BP goals are ~130 -165/60 -90 mmHg.This is higher than ACC and AHA recommendations due to risk for hypotension, risk of dizziness and falls and risk of tissue/cerebral hypoperfusion.     Orders written by provider at facility  1. Decrease Toprol XL to 25 mg po daily  2. Decrease Depakote to 125 mg q AM, 250 mg q afternoon and evening.         Electronically signed by:  AARON Aggarwal  CNP                Sincerely,        Mili Fermin, APRN CNP

## 2021-01-10 ENCOUNTER — HEALTH MAINTENANCE LETTER (OUTPATIENT)
Age: 71
End: 2021-01-10

## 2021-01-18 ENCOUNTER — NURSING HOME VISIT (OUTPATIENT)
Dept: GERIATRICS | Facility: CLINIC | Age: 71
End: 2021-01-18
Payer: COMMERCIAL

## 2021-01-18 VITALS
RESPIRATION RATE: 16 BRPM | HEART RATE: 76 BPM | TEMPERATURE: 97.6 F | WEIGHT: 141.1 LBS | BODY MASS INDEX: 25.96 KG/M2 | SYSTOLIC BLOOD PRESSURE: 131 MMHG | DIASTOLIC BLOOD PRESSURE: 76 MMHG | HEIGHT: 62 IN | OXYGEN SATURATION: 97 %

## 2021-01-18 DIAGNOSIS — R63.4 WEIGHT LOSS: ICD-10-CM

## 2021-01-18 DIAGNOSIS — F41.9 ANXIETY: ICD-10-CM

## 2021-01-18 DIAGNOSIS — G30.1 LATE ONSET ALZHEIMER'S DISEASE WITH BEHAVIORAL DISTURBANCE (H): ICD-10-CM

## 2021-01-18 DIAGNOSIS — G30.9 ALZHEIMER'S DEMENTIA WITHOUT BEHAVIORAL DISTURBANCE, UNSPECIFIED TIMING OF DEMENTIA ONSET: Primary | ICD-10-CM

## 2021-01-18 DIAGNOSIS — F02.80 ALZHEIMER'S DEMENTIA WITHOUT BEHAVIORAL DISTURBANCE, UNSPECIFIED TIMING OF DEMENTIA ONSET: Primary | ICD-10-CM

## 2021-01-18 DIAGNOSIS — F22 DELUSIONS (H): ICD-10-CM

## 2021-01-18 DIAGNOSIS — F02.818 LATE ONSET ALZHEIMER'S DISEASE WITH BEHAVIORAL DISTURBANCE (H): ICD-10-CM

## 2021-01-18 PROCEDURE — 99309 SBSQ NF CARE MODERATE MDM 30: CPT | Performed by: NURSE PRACTITIONER

## 2021-01-18 RX ORDER — DIVALPROEX SODIUM 125 MG/1
250 TABLET, DELAYED RELEASE ORAL 3 TIMES DAILY
Start: 2021-01-18 | End: 2023-01-07

## 2021-01-18 ASSESSMENT — MIFFLIN-ST. JEOR: SCORE: 1108.28

## 2021-01-18 NOTE — LETTER
1/18/2021        RE: Shari Graham  [facility]  [address] [address]        [place] [facility]  [place] Medical Record Number:  [id number]  Place of Service where encounter took place:  [facility] ([facility]) [[id number]]  Chief Complaint   Patient presents with     Nursing Home Acute       HPI:    Shari Graham  is a 70 year old (1950), who is being seen today for an episodic care visit.  HPI information obtained from: facility chart records, facility staff, patient report and [facility] chart review. Today's concern is:     Alzheimer's dementia without behavioral disturbance, unspecified timing of dementia onset (H)  Anxiety  Delusions (H)  Weight loss  Late onset Alzheimer's disease with behavioral disturbance (H)     Patient is a 69 year old woman with PMH including progressive Alzheimer's Dementia, HTN, HLD who has been seen at [facility] ED on 8/20/20 and [facility] ED/[facility] 8/22-24 for increased agitation, confusion.  Head CT was neg, no evidence of pyuria, CHEST XRAY clear, no metabolic etiology found.  It was felt her altered mental status was 2/2 progressing dementia and she was moved to [facility] memory care unit for increased care needs.   Family's goals (per nursing) is comfort.       Recent Medication Changes:  - 8/27/20: Olanzapine 2.5 mg TID, Olanzapine 2.5 mg q HS PRN x 14 days, discontinue Seroquel , Vitamin D 1000 units daily.  - 8/31/20: Tylenol 650 mg BID, Tylenol 650 mg BID PRN, Senna S 1 tab BID PRN   - 10/8/20: Decrease olanzapine to 2.5 mg BID  - 10/20/20: In house psych consult to eval & treat, Decrease olanzapine to 2.5 mg daily at 1600.  - 12/1/20: discontinue olanzapine  - 1/4/21: Decrease Toprol XL to 25 mg po daily, Decrease Depakote to 125 mg q AM, 250 mg q afternoon and evening      Nursing noting today that patient is very anxious and wandering a lot.  He reports she is often worried about her family and looking for them.   "Nursing reporting patient has been losing weight d/t excessive wandering.     Patient is met today in her SNF room where she is asleep.  Nursing reports she has been up for a while and is napping now.     Past Medical and Surgical History reviewed in Epic today.    MEDICATIONS:  Current Outpatient Medications   Medication Sig Dispense Refill     divalproex sodium delayed-release (DEPAKOTE) 125 MG DR tablet Take 2 tablets (250 mg) by mouth 3 times daily       acetaminophen (TYLENOL) 325 MG tablet Take 2 tablets (650 mg) by mouth 2 times daily. May also take 2 tablets (650 mg) 2 times daily as needed for mild pain.       cyanocobalamin (VITAMIN B-12) 100 MCG tablet Take 100 mcg by mouth daily       escitalopram (LEXAPRO) 20 MG tablet Take 20 mg by mouth At Bedtime       metoprolol succinate ER (TOPROL-XL) 50 MG 24 hr tablet Take 0.5 tablets (25 mg) by mouth daily 90 tablet 3     mirtazapine (REMERON) 15 MG tablet Take 1 tablet (15 mg) by mouth At Bedtime       OLANZapine (ZYPREXA) 2.5 MG tablet Take 1 tablet (2.5 mg) by mouth daily at 4pm       SENNA-docusate sodium (SENNA S) 8.6-50 MG tablet Take 1 tablet by mouth 2 times daily as needed (constipation)       simvastatin (ZOCOR) 10 MG tablet TAKE ONE TABLET BY MOUTH AT BEDTIME 90 tablet 3     Vitamin D, Cholecalciferol, 25 MCG (1000 UT) CAPS Take 1,000 Units by mouth daily       REVIEW OF SYSTEMS:  Unobtainable secondary to cognitive impairment and patient asleep.  Patient usually denies all symptoms (is a poor historian)     Objective:  /76   Pulse 76   Temp 97.6  F (36.4  C)   Resp 16   Ht 1.575 m (5' 2\")   Wt 64 kg (141 lb 1.6 oz)   SpO2 97%   BMI 25.81 kg/m    Exam:  GENERAL APPEARANCE:  Asleep, in no distress  RESP:  respiratory effort normal, no respiratory distress, on RA  CV:  No LE peripheral edema  ABDOMEN:  nondistended  M/S:   Gait and station ambulatory, Digits and nails with arthritic changes, reduced muscle mass  SKIN:  Inspection and " "Palpation of skin and subcutaneous tissue intact  PSYCH:  insight and judgement, memory with impairment, affect and mood normal, intermittently follows commands readily but forgets quickly     Labs:   Labs done while at [facility] done by [facility] [place] [employer] lab. Pertinent results are: reviewed    ASSESSMENT/PLAN:     Alzheimer's dementia without behavioral disturbance, unspecified timing of dementia onset (H)  Anxiety  Delusions (H)  Weight loss  Late onset Alzheimer's disease with behavioral disturbance (H)     Noted most recent changes of meds to discontinue olanzapine and GDR depakote.  Last visit on 1/4/21 patient appeared to be more anxious but was thinking this was an \"off\" day for her and decision with nursing to GFR Depakote as patient had tolerated olanzapine taper well.    Nursing now noting weight loss and excessive wandering.  Recent visit to memory care unit where it was noted patient wandering and anxious.    Weights:  9/2/20 - 153  10/7/20 - 148.5  12/2/20 - 147  1/6/21 - 141  1/12/21 - 141.1    Likely failed GDR of depakote.  Can go back to 250 mg TID (give additional 125 mg dose now to equal 250 mg dose for this AM). Will continue to monitor for effectiveness.     Orders written by provider at facility  1. Depakote 125 mg po x 1 now  2. Increase Depakote back to 250 mg TID (failed GDR)      Electronically signed by:  AARON Aggarwal CNP                 Sincerely,        AARON Aggarwal CNP    "

## 2021-01-18 NOTE — PROGRESS NOTES
[place] [facility]  [place] Medical Record Number:  [id number]  Place of Service where encounter took place:  [facility] ([facility]) [[id number]]  Chief Complaint   Patient presents with     Nursing Home Acute       HPI:    Shari Graham  is a 70 year old (1950), who is being seen today for an episodic care visit.  HPI information obtained from: facility chart records, facility staff, patient report and [facility] chart review. Today's concern is:     Alzheimer's dementia without behavioral disturbance, unspecified timing of dementia onset (H)  Anxiety  Delusions (H)  Weight loss  Late onset Alzheimer's disease with behavioral disturbance (H)     Patient is a 69 year old woman with PMH including progressive Alzheimer's Dementia, HTN, HLD who has been seen at [facility] ED on 8/20/20 and [facility] ED/[facility] 8/22-24 for increased agitation, confusion.  Head CT was neg, no evidence of pyuria, CHEST XRAY clear, no metabolic etiology found.  It was felt her altered mental status was 2/2 progressing dementia and she was moved to [facility] memory care unit for increased care needs.   Family's goals (per nursing) is comfort.       Recent Medication Changes:  - 8/27/20: Olanzapine 2.5 mg TID, Olanzapine 2.5 mg q HS PRN x 14 days, discontinue Seroquel , Vitamin D 1000 units daily.  - 8/31/20: Tylenol 650 mg BID, Tylenol 650 mg BID PRN, Senna S 1 tab BID PRN   - 10/8/20: Decrease olanzapine to 2.5 mg BID  - 10/20/20: In house psych consult to eval & treat, Decrease olanzapine to 2.5 mg daily at 1600.  - 12/1/20: discontinue olanzapine  - 1/4/21: Decrease Toprol XL to 25 mg po daily, Decrease Depakote to 125 mg q AM, 250 mg q afternoon and evening      Nursing noting today that patient is very anxious and wandering a lot.  He reports she is often worried about her family and looking for them.  Nursing reporting patient has been losing weight d/t excessive wandering.     Patient is met today in her SNF  "room where she is asleep.  Nursing reports she has been up for a while and is napping now.     Past Medical and Surgical History reviewed in Epic today.    MEDICATIONS:  Current Outpatient Medications   Medication Sig Dispense Refill     divalproex sodium delayed-release (DEPAKOTE) 125 MG DR tablet Take 2 tablets (250 mg) by mouth 3 times daily       acetaminophen (TYLENOL) 325 MG tablet Take 2 tablets (650 mg) by mouth 2 times daily. May also take 2 tablets (650 mg) 2 times daily as needed for mild pain.       cyanocobalamin (VITAMIN B-12) 100 MCG tablet Take 100 mcg by mouth daily       escitalopram (LEXAPRO) 20 MG tablet Take 20 mg by mouth At Bedtime       metoprolol succinate ER (TOPROL-XL) 50 MG 24 hr tablet Take 0.5 tablets (25 mg) by mouth daily 90 tablet 3     mirtazapine (REMERON) 15 MG tablet Take 1 tablet (15 mg) by mouth At Bedtime       SENNA-docusate sodium (SENNA S) 8.6-50 MG tablet Take 1 tablet by mouth 2 times daily as needed (constipation)       simvastatin (ZOCOR) 10 MG tablet TAKE ONE TABLET BY MOUTH AT BEDTIME 90 tablet 3     Vitamin D, Cholecalciferol, 25 MCG (1000 UT) CAPS Take 1,000 Units by mouth daily       REVIEW OF SYSTEMS:  Unobtainable secondary to cognitive impairment and patient asleep.  Patient usually denies all symptoms (is a poor historian)     Objective:  /76   Pulse 76   Temp 97.6  F (36.4  C)   Resp 16   Ht 1.575 m (5' 2\")   Wt 64 kg (141 lb 1.6 oz)   SpO2 97%   BMI 25.81 kg/m    Exam:  GENERAL APPEARANCE:  Asleep, in no distress  RESP:  respiratory effort normal, no respiratory distress, on RA  CV:  No LE peripheral edema  ABDOMEN:  nondistended  M/S:   Gait and station ambulatory, Digits and nails with arthritic changes, reduced muscle mass  SKIN:  Inspection and Palpation of skin and subcutaneous tissue intact  PSYCH:  insight and judgement, memory with impairment, affect and mood normal, intermittently follows commands readily but forgets quickly     Labs: " "  Labs done while at [facility] done by [employer] lab. Pertinent results are: reviewed    ASSESSMENT/PLAN:     Alzheimer's dementia without behavioral disturbance, unspecified timing of dementia onset (H)  Anxiety  Delusions (H)  Weight loss  Late onset Alzheimer's disease with behavioral disturbance (H)     Noted most recent changes of meds to discontinue olanzapine and GDR depakote.  Last visit on 1/4/21 patient appeared to be more anxious but was thinking this was an \"off\" day for her and decision with nursing to GFR Depakote as patient had tolerated olanzapine taper well.    Nursing now noting weight loss and excessive wandering.  Recent visit to memory care unit where it was noted patient wandering and anxious.    Weights:  9/2/20 - 153  10/7/20 - 148.5  12/2/20 - 147  1/6/21 - 141  1/12/21 - 141.1    Likely failed GDR of depakote.  Can go back to 250 mg TID (give additional 125 mg dose now to equal 250 mg dose for this AM). Will continue to monitor for effectiveness.     Orders written by provider at facility  1. Depakote 125 mg po x 1 now  2. Increase Depakote back to 250 mg TID (failed GDR)      Electronically signed by:  AARON Aggarwal CNP           "

## 2021-01-19 NOTE — ADDENDUM NOTE
Addended by: OCHOA DAO on: 1/19/2021 02:11 PM     Modules accepted: Orders     LAST VISIT     5/3/17  NEXT VISIT       Device Cinic  PLAN          Assessment and Plan:   Mr. Meredith is a 61 year old male who has a past medical history significant for HCM diagnosed , VT on Holter 2012 s/p ICD 2012, troponin leak Oct 2013 (angiogram with non-significant CAD, LV gram showed EF 25%, recovered to 60% on TTE Dec 2013), HTN, AFib (CHADSVASC 2) with DCCVs then s/p PVI 2011 and PVI for persistent AF on 16. He presents for follow-up. He reports feeling well. He denies any known recurrences of AF. He states that Xarelto has increasing out of pocket costs and he is wondering if there are alternatives. Current cardiac medications include: Sotalol, Metoprolol, and Xarelto.   We discussed in detail with the patient management/treatment options for Antonino includin. Stroke Prophylaxis:  CHADSVASC= 2, corresponding to a 2.2% annual stroke / systemic emolism event rate. indicating need for long term oral anticoagulation.  He is on Xarelto. No bleeding issues. He is asking for alternatives to Xarelto due to increasing out-of-pocket costs. We discussed alternatives in detail:   Anticoagulants include warfarin (Coumadin) and the noval oral anticoagulant agents: dabigatran (Pradaxa), rivaroxaban (Xarelto), and apixaban (Eliquis). The benefits of warfarin include: low cost, established track record in reduction of stroke and systemic embolism, the ability to reverse the agents, the ability to titrate the agent, and the ability to provide additional protection in subsets of patients who require anticoagulation for an independent process (i.e. prosthetic valve). The disadvantages of warfarin include: frequent phlebotomy for INRs, difficulty in regulation of INR [typically  60-65% of INRs within therapeutic range], and dietary constraints secondary to bioavailability. The benefits of the novel oral anticoagulants, as a class, include:  no phlebotomy, similar or significantly lower risk of  stroke or systemic embolism depending on the agent, and similar or significantly lower risk of bleeding, particularly intracranial bleeding. The disadvantages of the novel agents, as a class, include: higher cost, the inability to reverse the agents (except Pradaxa), and  the need to be cautious in patients with CKD.   He has been on warfarin previously and does not want to go back to warfarin given need for frequent phlebotomy. After screening his insurance coverage, he will switch of Eliquis 5 mg BID. He will use the rest of his Xarelto supply and then make the switch to Eliquis.   2. Rate Control: Continue Metoprolol.   3. Rhythm Control: He has had a PVI ablation in 2011 with several recurrences requiring DCCV and then repeat PVI in July 2016. He had previously failed multaq and is currently on Sotalol. He is doing well after ablation without recurrences. Ablation voltage mapping showed high scar burden in atrium; therefore, he has higher recurrence rate for AF and may be difficult to maintain NSR. Considering this, we will continue Sotalol at current dosing.     4. Risk Factor Management: maintain tight BP control, and GONZALEZ evaluation as indicated.    He will continue to follow with the device clinic per routine. We will have him follow up 1 year.

## 2021-01-27 NOTE — PROGRESS NOTES
[place] [facility]  [place] Medical Record Number:  [id number]  Place of Service where encounter took place:  [facility] (SNF) [[id number]]  Chief Complaint   Patient presents with     Nursing Home Acute       HPI:    Shari Graham  is a 70 year old (1950), who is being seen today for an episodic care visit.  HPI information obtained from: facility chart records, facility staff, patient report, [facility] chart review and family/first contact pt's daughter's report. Today's concern is:     Alzheimer's dementia without behavioral disturbance, unspecified timing of dementia onset (H)  Anxiety  Delusions (H)  Weight loss  Essential hypertension  Hyperlipidemia LDL goal <130     Patient is a 69 year old woman with PMH including progressive Alzheimer's Dementia, HTN, HLD who has been seen at [facility] ED on 8/20/20 and [facility] ED/[facility] 8/22-24 for increased agitation, confusion.  Head CT was neg, no evidence of pyuria, CHEST XRAY clear, no metabolic etiology found.  It was felt her altered mental status was 2/2 progressing dementia and she was moved to [facility] memory care unit for increased care needs.   Family's goals (per nursing) is comfort.       Recent Medication Changes:  - 8/27/20: Olanzapine 2.5 mg TID, Olanzapine 2.5 mg q HS PRN x 14 days, discontinue Seroquel , Vitamin D 1000 units daily.  - 8/31/20: Tylenol 650 mg BID, Tylenol 650 mg BID PRN, Senna S 1 tab BID PRN   - 10/8/20: Decrease olanzapine to 2.5 mg BID  - 10/20/20: In house psych consult to eval & treat, Decrease olanzapine to 2.5 mg daily at 1600.  - 12/1/20: discontinue olanzapine  - 1/4/21: Decrease Toprol XL to 25 mg po daily, Decrease Depakote to 125 mg q AM, 250 mg q afternoon and evening  - 1/18/21: Increase Depakote back to 250 mg TID (failed GDR)    Nursing reporting patient continues to appear anxious, wandering more, resistive to cares (ronaldo in evening).  Nursing reporting that patient is slowly getting  back to her normal self but is not quite there yet.    Nursing reports that patient has a short attention span; communication is often nonsensical and repetitive, but she enjoys spending time with others.  Nursing today reporting patient has found another friend whom she spends a lot of the time with, wandering together.       Weights:  9/2/20 - 153  10/7/20 - 148.5  12/2/20 - 147  1/6/21 - 141  1/12/21 - 141.1      Patient is me today in the memory care unit where she is wandering with her friend.  She seems happy but does not want to stop and chat, rather would prefer to just keep walking.  She denies any pain, shortness of breath, CP, constipation.      Past Medical and Surgical History reviewed in Epic today.    MEDICATIONS:  Current Outpatient Medications   Medication Sig Dispense Refill     QUEtiapine (SEROQUEL) 25 MG tablet Take 0.5 tablets (12.5 mg) by mouth 3 times daily as needed (anxiety, agitation, delusions, crying)       acetaminophen (TYLENOL) 325 MG tablet Take 2 tablets (650 mg) by mouth 2 times daily. May also take 2 tablets (650 mg) 2 times daily as needed for mild pain.       cyanocobalamin (VITAMIN B-12) 100 MCG tablet Take 100 mcg by mouth daily       divalproex sodium delayed-release (DEPAKOTE) 125 MG DR tablet Take 2 tablets (250 mg) by mouth 3 times daily       escitalopram (LEXAPRO) 20 MG tablet Take 20 mg by mouth At Bedtime       metoprolol succinate ER (TOPROL-XL) 50 MG 24 hr tablet Take 0.5 tablets (25 mg) by mouth daily 90 tablet 3     mirtazapine (REMERON) 15 MG tablet Take 1 tablet (15 mg) by mouth At Bedtime       SENNA-docusate sodium (SENNA S) 8.6-50 MG tablet Take 1 tablet by mouth 2 times daily as needed (constipation)       simvastatin (ZOCOR) 10 MG tablet TAKE ONE TABLET BY MOUTH AT BEDTIME 90 tablet 3     Vitamin D, Cholecalciferol, 25 MCG (1000 UT) CAPS Take 1,000 Units by mouth daily       REVIEW OF SYSTEMS:  Limited secondary to cognitive impairment but today pt reports 10  "point ROS of systems including Constitutional, Eyes, Respiratory, Cardiovascular, Gastroenterology, Genitourinary, Integumentary, Musculoskeletal, Psychiatric were all negative except for pertinent positives noted in my HPI.    Objective:  BP (!) 142/81   Pulse 64   Temp 98.1  F (36.7  C)   Resp 19   Ht 1.575 m (5' 2\")   Wt 59.5 kg (131 lb 1.6 oz)   SpO2 95%   BMI 23.98 kg/m    Exam:  GENERAL APPEARANCE:  Alert, in no distress, pleasantly confused  RESP:  respiratory effort normal, no respiratory distress, on RA  CV:  No LE peripheral edema  ABDOMEN:  nondistended  M/S:   Gait and station ambulatory without AE, Digits and nails with arthritic changes, reduced muscle mass  SKIN:  Inspection and Palpation of skin and subcutaneous tissue pale, intact  PSYCH:  insight and judgement, memory with impairment, affect and mood normal, follows commands with repeated request and then forgets quickly           Labs:   [facility] done by [employer] lab. Pertinent results are: reviewed    ASSESSMENT/PLAN:  Alzheimer's dementia without behavioral disturbance, unspecified timing of dementia onset (H)  Anxiety  Delusions (H)  Improved behaviors but still anxious, tearful at times, resistive to cares, excessive wandering.    Nursing reporting patient is close to her baseline but still not quite there from the last GDR.  Family's goals is comfort.     Noted PRN orders for antipsychotic medications are limited to 14 days. Face to Face encounter done today. Evaluation of antipsychotic medication indicates without this medication the patient's safety is in jeopardy due to anxiety, excessive wandering, resistive to cares.  Non-Pharm approaches of redirection, reassurance, distraction, etc have not been effective.     Spoke with Pharmacy as previous olanzapine medication only covered daily (1 dose) and ideally would like it more available as PRN dosing to monitor for trends.  Pharmacist noted " likely Seroquel low dose PRN will be covered.  Can trial this and if not covered, can change.      PLAN:  - continue regimen including Depakote 250 mg TID, escitalopram 20 mg daily, mirtazapine 15 mg q HS  - add (new) seroquel 12.5 mg TID PRN x 14 days and monitor for trends of use.     Weight loss  Plan to increase nutritional supplement to BID  Likely 2/2 excessive wandering  May wish to increase mirtazapine but as escitalopram already higher, this may be problematic.     Essential hypertension  Hyperlipidemia LDL goal <130  BPs mostly 130-140s/60-80s  HRs 64-76  Patient denies CP, HA, lightheadedness    PLAN:  - Stable after decrease in Toprol. Continue regimen  - BP goals are <150/90 mm Hg.This is higher than ACC and AHA recommendations due to goals of care, risk for hypotension, risk of dizziness and falls, risk of tissue/cerebral hypoperfusion and frailty.         Orders written by provider at facility  1. Increase nutritional supplement to 4 oz BID  2. Seroquel 12.5 mg TID PRN x 14 days.       Electronically signed by:  AARON Aggarwal CNP

## 2021-01-28 ENCOUNTER — NURSING HOME VISIT (OUTPATIENT)
Dept: GERIATRICS | Facility: CLINIC | Age: 71
End: 2021-01-28
Payer: COMMERCIAL

## 2021-01-28 VITALS
HEIGHT: 62 IN | BODY MASS INDEX: 24.13 KG/M2 | HEART RATE: 64 BPM | OXYGEN SATURATION: 95 % | TEMPERATURE: 98.1 F | RESPIRATION RATE: 19 BRPM | DIASTOLIC BLOOD PRESSURE: 81 MMHG | WEIGHT: 131.1 LBS | SYSTOLIC BLOOD PRESSURE: 142 MMHG

## 2021-01-28 DIAGNOSIS — E78.5 HYPERLIPIDEMIA LDL GOAL <130: ICD-10-CM

## 2021-01-28 DIAGNOSIS — F22 DELUSIONS (H): ICD-10-CM

## 2021-01-28 DIAGNOSIS — F41.9 ANXIETY: ICD-10-CM

## 2021-01-28 DIAGNOSIS — I10 ESSENTIAL HYPERTENSION: ICD-10-CM

## 2021-01-28 DIAGNOSIS — F02.80 ALZHEIMER'S DEMENTIA WITHOUT BEHAVIORAL DISTURBANCE, UNSPECIFIED TIMING OF DEMENTIA ONSET: Primary | ICD-10-CM

## 2021-01-28 DIAGNOSIS — R63.4 WEIGHT LOSS: ICD-10-CM

## 2021-01-28 DIAGNOSIS — G30.9 ALZHEIMER'S DEMENTIA WITHOUT BEHAVIORAL DISTURBANCE, UNSPECIFIED TIMING OF DEMENTIA ONSET: Primary | ICD-10-CM

## 2021-01-28 PROCEDURE — 99309 SBSQ NF CARE MODERATE MDM 30: CPT | Performed by: NURSE PRACTITIONER

## 2021-01-28 RX ORDER — QUETIAPINE FUMARATE 25 MG/1
12.5 TABLET, FILM COATED ORAL 3 TIMES DAILY PRN
Start: 2021-01-28 | End: 2021-03-06

## 2021-01-28 ASSESSMENT — MIFFLIN-ST. JEOR: SCORE: 1062.92

## 2021-01-28 NOTE — LETTER
1/28/2021        RE: Shari Graham  [facility]  [address] [address]        [place] [facility]  [place] Medical Record Number:  [id number]  Place of Service where encounter took place:  [facility] ([facility]) [[id number]]  Chief Complaint   Patient presents with     Nursing Home Acute       HPI:    Shari Graham  is a 70 year old (1950), who is being seen today for an episodic care visit.  HPI information obtained from: facility chart records, facility staff, patient report, [facility] chart review and family/first contact pt's daughter's report. Today's concern is:     Alzheimer's dementia without behavioral disturbance, unspecified timing of dementia onset (H)  Anxiety  Delusions (H)  Weight loss  Essential hypertension  Hyperlipidemia LDL goal <130     Patient is a 69 year old woman with PMH including progressive Alzheimer's Dementia, HTN, HLD who has been seen at [facility] ED on 8/20/20 and [facility] ED/[facility] 8/22-24 for increased agitation, confusion.  Head CT was neg, no evidence of pyuria, CHEST XRAY clear, no metabolic etiology found.  It was felt her altered mental status was 2/2 progressing dementia and she was moved to [facility] memory care unit for increased care needs.   Family's goals (per nursing) is comfort.       Recent Medication Changes:  - 8/27/20: Olanzapine 2.5 mg TID, Olanzapine 2.5 mg q HS PRN x 14 days, discontinue Seroquel , Vitamin D 1000 units daily.  - 8/31/20: Tylenol 650 mg BID, Tylenol 650 mg BID PRN, Senna S 1 tab BID PRN   - 10/8/20: Decrease olanzapine to 2.5 mg BID  - 10/20/20: In house psych consult to eval & treat, Decrease olanzapine to 2.5 mg daily at 1600.  - 12/1/20: discontinue olanzapine  - 1/4/21: Decrease Toprol XL to 25 mg po daily, Decrease Depakote to 125 mg q AM, 250 mg q afternoon and evening  - 1/18/21: Increase Depakote back to 250 mg TID (failed GDR)    Nursing reporting patient continues to appear  anxious, wandering more, resistive to cares (ronaldo in evening).  Nursing reporting that patient is slowly getting back to her normal self but is not quite there yet.    Nursing reports that patient has a short attention span; communication is often nonsensical and repetitive, but she enjoys spending time with others.  Nursing today reporting patient has found another friend whom she spends a lot of the time with, wandering together.       Weights:  9/2/20 - 153  10/7/20 - 148.5  12/2/20 - 147  1/6/21 - 141  1/12/21 - 141.1      Patient is me today in the memory care unit where she is wandering with her friend.  She seems happy but does not want to stop and chat, rather would prefer to just keep walking.  She denies any pain, shortness of breath, CP, constipation.      Past Medical and Surgical History reviewed in Epic today.    MEDICATIONS:  Current Outpatient Medications   Medication Sig Dispense Refill     QUEtiapine (SEROQUEL) 25 MG tablet Take 0.5 tablets (12.5 mg) by mouth 3 times daily as needed (anxiety, agitation, delusions, crying)       acetaminophen (TYLENOL) 325 MG tablet Take 2 tablets (650 mg) by mouth 2 times daily. May also take 2 tablets (650 mg) 2 times daily as needed for mild pain.       cyanocobalamin (VITAMIN B-12) 100 MCG tablet Take 100 mcg by mouth daily       divalproex sodium delayed-release (DEPAKOTE) 125 MG DR tablet Take 2 tablets (250 mg) by mouth 3 times daily       escitalopram (LEXAPRO) 20 MG tablet Take 20 mg by mouth At Bedtime       metoprolol succinate ER (TOPROL-XL) 50 MG 24 hr tablet Take 0.5 tablets (25 mg) by mouth daily 90 tablet 3     mirtazapine (REMERON) 15 MG tablet Take 1 tablet (15 mg) by mouth At Bedtime       SENNA-docusate sodium (SENNA S) 8.6-50 MG tablet Take 1 tablet by mouth 2 times daily as needed (constipation)       simvastatin (ZOCOR) 10 MG tablet TAKE ONE TABLET BY MOUTH AT BEDTIME 90 tablet 3     Vitamin D, Cholecalciferol, 25 MCG (1000 UT) CAPS Take 1,000  "Units by mouth daily       REVIEW OF SYSTEMS:  Limited secondary to cognitive impairment but today pt reports 10 point ROS of systems including Constitutional, Eyes, Respiratory, Cardiovascular, Gastroenterology, Genitourinary, Integumentary, Musculoskeletal, Psychiatric were all negative except for pertinent positives noted in my HPI.    Objective:  BP (!) 142/81   Pulse 64   Temp 98.1  F (36.7  C)   Resp 19   Ht 1.575 m (5' 2\")   Wt 59.5 kg (131 lb 1.6 oz)   SpO2 95%   BMI 23.98 kg/m    Exam:  GENERAL APPEARANCE:  Alert, in no distress, pleasantly confused  RESP:  respiratory effort normal, no respiratory distress, on RA  CV:  No LE peripheral edema  ABDOMEN:  nondistended  M/S:   Gait and station ambulatory without AE, Digits and nails with arthritic changes, reduced muscle mass  SKIN:  Inspection and Palpation of skin and subcutaneous tissue pale, intact  PSYCH:  insight and judgement, memory with impairment, affect and mood normal, follows commands with repeated request and then forgets quickly           Labs:   Labs done [facility] done by [facility]. Pertinent results are: reviewed    ASSESSMENT/PLAN:  Alzheimer's dementia without behavioral disturbance, unspecified timing of dementia onset (H)  Anxiety  Delusions (H)  Improved behaviors but still anxious, tearful at times, resistive to cares, excessive wandering.    Nursing reporting patient is close to her baseline but still not quite there from the last GDR.  Family's goals is comfort.     Noted PRN orders for antipsychotic medications are limited to 14 days. Face to Face encounter done today. Evaluation of antipsychotic medication indicates without this medication the patient's safety is in jeopardy due to anxiety, excessive wandering, resistive to cares.  Non-Pharm approaches of redirection, reassurance, distraction, etc have not been effective.     Spoke with Pharmacy as previous olanzapine medication only covered " daily (1 dose) and ideally would like it more available as PRN dosing to monitor for trends.  Pharmacist noted likely Seroquel low dose PRN will be covered.  Can trial this and if not covered, can change.      PLAN:  - continue regimen including Depakote 250 mg TID, escitalopram 20 mg daily, mirtazapine 15 mg q HS  - add (new) seroquel 12.5 mg TID PRN x 14 days and monitor for trends of use.     Weight loss  Plan to increase nutritional supplement to BID  Likely 2/2 excessive wandering  May wish to increase mirtazapine but as escitalopram already higher, this may be problematic.     Essential hypertension  Hyperlipidemia LDL goal <130  BPs mostly 130-140s/60-80s  HRs 64-76  Patient denies CP, HA, lightheadedness    PLAN:  - Stable after decrease in Toprol. Continue regimen  - BP goals are <150/90 mm Hg.This is higher than ACC and AHA recommendations due to goals of care, risk for hypotension, risk of dizziness and falls, risk of tissue/cerebral hypoperfusion and frailty.         Orders written by provider at facility  1. Increase nutritional supplement to 4 oz BID  2. Seroquel 12.5 mg TID PRN x 14 days.       Electronically signed by:  AARON Aggarwal CNP                 Sincerely,        AARON Aggarwal CNP

## 2021-03-04 ENCOUNTER — NURSING HOME VISIT (OUTPATIENT)
Dept: GERIATRICS | Facility: CLINIC | Age: 71
End: 2021-03-04
Payer: COMMERCIAL

## 2021-03-04 VITALS
HEART RATE: 62 BPM | OXYGEN SATURATION: 95 % | DIASTOLIC BLOOD PRESSURE: 60 MMHG | WEIGHT: 139.2 LBS | SYSTOLIC BLOOD PRESSURE: 94 MMHG | RESPIRATION RATE: 18 BRPM | BODY MASS INDEX: 25.46 KG/M2 | TEMPERATURE: 98.2 F

## 2021-03-04 DIAGNOSIS — R79.89 ELEVATED TSH: ICD-10-CM

## 2021-03-04 DIAGNOSIS — F41.9 ANXIETY: ICD-10-CM

## 2021-03-04 DIAGNOSIS — I10 ESSENTIAL HYPERTENSION: ICD-10-CM

## 2021-03-04 DIAGNOSIS — K59.00 CONSTIPATION, UNSPECIFIED CONSTIPATION TYPE: ICD-10-CM

## 2021-03-04 DIAGNOSIS — G30.1 LATE ONSET ALZHEIMER'S DISEASE WITH BEHAVIORAL DISTURBANCE (H): Primary | ICD-10-CM

## 2021-03-04 DIAGNOSIS — R52 PAIN: ICD-10-CM

## 2021-03-04 DIAGNOSIS — R63.4 WEIGHT LOSS: ICD-10-CM

## 2021-03-04 DIAGNOSIS — E78.5 HYPERLIPIDEMIA LDL GOAL <130: ICD-10-CM

## 2021-03-04 DIAGNOSIS — F02.818 LATE ONSET ALZHEIMER'S DISEASE WITH BEHAVIORAL DISTURBANCE (H): Primary | ICD-10-CM

## 2021-03-04 PROCEDURE — 99309 SBSQ NF CARE MODERATE MDM 30: CPT | Performed by: NURSE PRACTITIONER

## 2021-03-04 NOTE — LETTER
"    3/4/2021        RE: Shari Graham  [facility]  [address] [address]        [place] [facility]  [place] Medical Record Number:  [id number]  Place of Service where encounter took place:  [facility] () [[id number]]  Chief Complaint   Patient presents with     RECHECK     HPI:    Shari Graham  is a 70 year old (1950), who is being seen today for an episodic care visit.  HPI information obtained from: facility chart records, facility staff, patient report, [facility] chart review and family/first contact daughter report.     This is a 70-yea-old female, with a past medical history significant for Alzheimer's dementia, hypertension and hyperlipidemia, who was last hospitalized at [facility] 8/22/20 through 8/24/20 for increased agitation and confusion. Felt to be secondary to progressing dementia and transferred to [facility] long term care.    Today's concern is:    Alzheimer's Dementia, Anxiety and Delusions. Per staff report, having increased behaviors and several outbursts particularly in the afternoon-to-evening. More \"on edge\". Resistant to staff. Possessive of roommate and will become combative if seperated. Has thrown milk at staff at meal time. Seen by in-house psych and was noted to be more anxious. Unfortunately, note is not complete to review. Today, patient's daughter is visiting. Patient has been walking around in the halls with her daughter. Is unable to provide any meaningful answers to questions. Is pleasant during visit. Per daughter's report, Zyprexa made a significant difference while patient was in the hospital.     Weight Loss. Upon review of weights, 153 lbs on 8/25/20 -> 147 lbs on 12/2/20 -> 141 lbs on 1/6/21 -> 139.2 lbs on 2/3/21.     Hypertension and Hyperlipidemia. Upon review of blood pressures over the past month, single reading of 94/60 on 2/18/21.     Pain. No reports of pain during today's visit. Upon " review of documentation, has not utilized Acetaminophen PRN since September 2020.     Elevated TSH. Noted upon admission in August 2020. No recent TSH on file.     Past Medical and Surgical History reviewed in Epic today.    MEDICATIONS:  Current Outpatient Medications   Medication Sig Dispense Refill     acetaminophen (TYLENOL) 325 MG tablet Take 2 tablets (650 mg) by mouth 2 times daily. May also take 2 tablets (650 mg) 2 times daily as needed for mild pain.       cyanocobalamin (VITAMIN B-12) 100 MCG tablet Take 100 mcg by mouth daily       divalproex sodium delayed-release (DEPAKOTE) 125 MG DR tablet Take 2 tablets (250 mg) by mouth 3 times daily       escitalopram (LEXAPRO) 20 MG tablet Take 20 mg by mouth At Bedtime       metoprolol succinate ER (TOPROL-XL) 50 MG 24 hr tablet Take 0.5 tablets (25 mg) by mouth daily 90 tablet 3     mirtazapine (REMERON) 15 MG tablet Take 1 tablet (15 mg) by mouth At Bedtime       QUEtiapine (SEROQUEL) 25 MG tablet Take 0.5 tablets (12.5 mg) by mouth 3 times daily as needed (anxiety, agitation, delusions, crying)       SENNA-docusate sodium (SENNA S) 8.6-50 MG tablet Take 1 tablet by mouth 2 times daily as needed (constipation)       simvastatin (ZOCOR) 10 MG tablet TAKE ONE TABLET BY MOUTH AT BEDTIME 90 tablet 3     Vitamin D, Cholecalciferol, 25 MCG (1000 UT) CAPS Take 1,000 Units by mouth daily       REVIEW OF SYSTEMS:  4 point ROS including Respiratory, CV, GI and , other than that noted in the HPI,  is negative    Objective:  BP 94/60   Pulse 62   Temp 98.2  F (36.8  C)   Resp 18   Wt 63.1 kg (139 lb 3.2 oz)   SpO2 95%   BMI 25.46 kg/m    Exam:  GENERAL APPEARANCE:  Alert, in no distress  ENT:  Mouth and posterior oropharynx normal, moist mucous membranes  EYES:  EOM, conjunctivae, lids, pupils and irises normal  RESP:  respiratory effort and palpation of chest normal, lungs clear to auscultation , no respiratory distress  CV:  Palpation and auscultation of heart  done , regular rate and rhythm, no murmur, rub, or gallop  ABDOMEN:  normal bowel sounds, soft, nontender, no hepatosplenomegaly or other masses  M/S:   Active movement of bilateral upper and lower extremities.   SKIN:  Inspection of skin and subcutaneous tissue baseline, Palpation of skin and subcutaneous tissue baseline  NEURO:   Cranial nerves 2-12 are normal tested and grossly at patient's baseline  PSYCH:  affect and mood normal    Labs:   Labs done in SNF are in [place] EPIC. Please refer to them using [employer] Everywhere.    ASSESSMENT/PLAN:  Alzheimer's Dementia, Anxiety and Delusions. Requiring hospitalization in August for increased agitation and confusion. Quetiapine discontinued in August with initiation of PRN in January 2021 x 14 days. Failed GDR of Divalproex in January 2021. Tapered off Olanzapine in December 2020. Given increase in behaviors, discussed with collaborative MD and daughter re-initiation of Olanzpaine. Will schedule at lunch time to help prevent escalation in the afternoon. Continue Escitaloprm as ordered. Further plans pending results.     Weight Loss. Weights trending down since admission to facility in August 2020.  Noted to have excessive wandering. Takes Mirtazapine and would not adjust dose given less sedation in an already excessive wanderer. Supplement increased on 1/28/21. Given re-initiation of Olanzapine, may gain weight due to side effect. Continue to monitor weights monthly. Goal of care is comfort.      Hypertension and Hyperlipidemia. Limited blood pressure readings available to review. Will ask staff to repeat blood pressure to ensure stability. Takes Metoprolol and Simvastatin.     Pain. Given non-use of Acetaminophen PRN, will discontinue. Consider GDR of scheduled Acetaminophen in the future.     Elevated TSH.  TSH 5.17 on 8/22/20. Will repeat TSH and Free T4 on next lab day.     Constipation. Continue Senna-S PRN as ordered.     Orders written by provider at  facility  Olanzapine 2.5 mg PO every day     Electronically signed by:  AARON Funez CNP             Sincerely,        AARON Funez CNP

## 2021-03-04 NOTE — PROGRESS NOTES
"[place] [facility]  [place] Medical Record Number:  [id number]  Place of Service where encounter took place:  [facility] () [[id number]]  Chief Complaint   Patient presents with     RECHECK     HPI:    Shari Graham  is a 70 year old (1950), who is being seen today for an episodic care visit.  HPI information obtained from: facility chart records, facility staff, patient report, [facility] chart review and family/first contact daughter report.     This is a 70-yea-old female, with a past medical history significant for Alzheimer's dementia, hypertension and hyperlipidemia, who was last hospitalized at [facility] 8/22/20 through 8/24/20 for increased agitation and confusion. Felt to be secondary to progressing dementia and transferred to [facility] long term [facility].    Today's concern is:    Alzheimer's Dementia, Anxiety and Delusions. Per staff report, having increased behaviors and several outbursts particularly in the afternoon-to-evening. More \"on edge\". Resistant to staff. Possessive of roommate and will become combative if seperated. Has thrown milk at staff at meal time. Seen by in-house psych and was noted to be more anxious. Unfortunately, note is not complete to review. Today, patient's daughter is visiting. Patient has been walking around in the halls with her daughter. Is unable to provide any meaningful answers to questions. Is pleasant during visit. Per daughter's report, Zyprexa made a significant difference while patient was in the hospital.     Weight Loss. Upon review of weights, 153 lbs on 8/25/20 -> 147 lbs on 12/2/20 -> 141 lbs on 1/6/21 -> 139.2 lbs on 2/3/21.     Hypertension and Hyperlipidemia. Upon review of blood pressures over the past month, single reading of 94/60 on 2/18/21.     Pain. No reports of pain during today's visit. Upon review of documentation, has not utilized Acetaminophen PRN since September 2020.     Elevated TSH. Noted upon " admission in August 2020. No recent TSH on file.     Past Medical and Surgical History reviewed in Epic today.    MEDICATIONS:  Current Outpatient Medications   Medication Sig Dispense Refill     acetaminophen (TYLENOL) 325 MG tablet Take 2 tablets (650 mg) by mouth 2 times daily. May also take 2 tablets (650 mg) 2 times daily as needed for mild pain.       cyanocobalamin (VITAMIN B-12) 100 MCG tablet Take 100 mcg by mouth daily       divalproex sodium delayed-release (DEPAKOTE) 125 MG DR tablet Take 2 tablets (250 mg) by mouth 3 times daily       escitalopram (LEXAPRO) 20 MG tablet Take 20 mg by mouth At Bedtime       metoprolol succinate ER (TOPROL-XL) 50 MG 24 hr tablet Take 0.5 tablets (25 mg) by mouth daily 90 tablet 3     mirtazapine (REMERON) 15 MG tablet Take 1 tablet (15 mg) by mouth At Bedtime       QUEtiapine (SEROQUEL) 25 MG tablet Take 0.5 tablets (12.5 mg) by mouth 3 times daily as needed (anxiety, agitation, delusions, crying)       SENNA-docusate sodium (SENNA S) 8.6-50 MG tablet Take 1 tablet by mouth 2 times daily as needed (constipation)       simvastatin (ZOCOR) 10 MG tablet TAKE ONE TABLET BY MOUTH AT BEDTIME 90 tablet 3     Vitamin D, Cholecalciferol, 25 MCG (1000 UT) CAPS Take 1,000 Units by mouth daily       REVIEW OF SYSTEMS:  4 point ROS including Respiratory, CV, GI and , other than that noted in the HPI,  is negative    Objective:  BP 94/60   Pulse 62   Temp 98.2  F (36.8  C)   Resp 18   Wt 63.1 kg (139 lb 3.2 oz)   SpO2 95%   BMI 25.46 kg/m    Exam:  GENERAL APPEARANCE:  Alert, in no distress  ENT:  Mouth and posterior oropharynx normal, moist mucous membranes  EYES:  EOM, conjunctivae, lids, pupils and irises normal  RESP:  respiratory effort and palpation of chest normal, lungs clear to auscultation , no respiratory distress  CV:  Palpation and auscultation of heart done , regular rate and rhythm, no murmur, rub, or gallop  ABDOMEN:  normal bowel sounds, soft, nontender, no  hepatosplenomegaly or other masses  M/S:   Active movement of bilateral upper and lower extremities.   SKIN:  Inspection of skin and subcutaneous tissue baseline, Palpation of skin and subcutaneous tissue baseline  NEURO:   Cranial nerves 2-12 are normal tested and grossly at patient's baseline  PSYCH:  affect and mood normal    Labs:   Labs done in SNF are in [place] EPIC. Please refer to them using EPIC/Care Everywhere.    ASSESSMENT/PLAN:  Alzheimer's Dementia, Anxiety and Delusions. Requiring hospitalization in August for increased agitation and confusion. Quetiapine discontinued in August with initiation of PRN in January 2021 x 14 days. Failed GDR of Divalproex in January 2021. Tapered off Olanzapine in December 2020. Given increase in behaviors, discussed with collaborative MD and daughter re-initiation of Olanzpaine. Will schedule at lunch time to help prevent escalation in the afternoon. Continue Escitaloprm as ordered. Further plans pending results.     Weight Loss. Weights trending down since admission to facility in August 2020.  Noted to have excessive wandering. Takes Mirtazapine and would not adjust dose given less sedation in an already excessive wanderer. Supplement increased on 1/28/21. Given re-initiation of Olanzapine, may gain weight due to side effect. Continue to monitor weights monthly. Goal of care is comfort.      Hypertension and Hyperlipidemia. Limited blood pressure readings available to review. Will ask staff to repeat blood pressure to ensure stability. Takes Metoprolol and Simvastatin.     Pain. Given non-use of Acetaminophen PRN, will discontinue. Consider GDR of scheduled Acetaminophen in the future.     Elevated TSH.  TSH 5.17 on 8/22/20. Will repeat TSH and Free T4 on next lab day.     Constipation. Continue Senna-S PRN as ordered.     Orders written by provider at facility  Olanzapine 2.5 mg PO every day     Electronically signed by:  AARON Funez CNP

## 2021-03-06 RX ORDER — OLANZAPINE 2.5 MG/1
2.5 TABLET, FILM COATED ORAL DAILY
COMMUNITY
End: 2021-11-24

## 2021-03-07 RX ORDER — ACETAMINOPHEN 325 MG/1
TABLET ORAL
Start: 2021-03-07 | End: 2022-05-18

## 2021-03-12 ENCOUNTER — TRANSFERRED RECORDS (OUTPATIENT)
Dept: HEALTH INFORMATION MANAGEMENT | Facility: CLINIC | Age: 71
End: 2021-03-12

## 2021-03-12 ENCOUNTER — RECORDS - HEALTHEAST (OUTPATIENT)
Dept: LAB | Facility: CLINIC | Age: 71
End: 2021-03-12

## 2021-03-12 VITALS
TEMPERATURE: 97.6 F | WEIGHT: 139.4 LBS | RESPIRATION RATE: 18 BRPM | HEART RATE: 67 BPM | OXYGEN SATURATION: 96 % | SYSTOLIC BLOOD PRESSURE: 108 MMHG | HEIGHT: 62 IN | DIASTOLIC BLOOD PRESSURE: 71 MMHG | BODY MASS INDEX: 25.65 KG/M2

## 2021-03-12 LAB
ANION GAP SERPL CALCULATED.3IONS-SCNC: 10 MMOL/L (ref 5–18)
ANION GAP SERPL CALCULATED.3IONS-SCNC: 10 MMOL/L (ref 5–18)
BUN SERPL-MCNC: 25 MG/DL (ref 8–28)
BUN SERPL-MCNC: 25 MG/DL (ref 8–28)
CALCIUM SERPL-MCNC: 8.8 MG/DL (ref 8.5–10.5)
CALCIUM SERPL-MCNC: 8.8 MG/DL (ref 8.5–10.5)
CHLORIDE BLD-SCNC: 107 MMOL/L (ref 98–107)
CHLORIDE SERPLBLD-SCNC: 107 MMOL/L (ref 98–107)
CO2 SERPL-SCNC: 26 MMOL/L (ref 22–31)
CO2 SERPL-SCNC: 26 MMOL/L (ref 22–31)
CREAT SERPL-MCNC: 0.66 MG/DL (ref 0.6–1.1)
CREAT SERPL-MCNC: 0.66 MG/DL (ref 0.6–1.1)
ERYTHROCYTE [DISTWIDTH] IN BLOOD BY AUTOMATED COUNT: 13.2 % (ref 11–14.5)
ERYTHROCYTE [DISTWIDTH] IN BLOOD BY AUTOMATED COUNT: 13.2 % (ref 11–14.5)
GFR SERPL CREATININE-BSD FRML MDRD: >60 ML/MIN/1.73M2
GFR SERPL CREATININE-BSD FRML MDRD: >60 ML/MIN/1.73M2
GLUCOSE BLD-MCNC: 78 MG/DL (ref 70–125)
GLUCOSE SERPL-MCNC: 78 MG/DL (ref 70–125)
HCT VFR BLD AUTO: 41 % (ref 35–47)
HCT VFR BLD AUTO: 41 % (ref 35–47)
HEMOGLOBIN: 13.1 G/DL (ref 12–16)
HGB BLD-MCNC: 13.1 G/DL (ref 12–16)
MCH RBC QN AUTO: 30.6 PG (ref 27–34)
MCH RBC QN AUTO: 30.6 PG (ref 27–34)
MCHC RBC AUTO-ENTMCNC: 32 G/DL (ref 32–36)
MCHC RBC AUTO-ENTMCNC: 32 G/DL (ref 32–36)
MCV RBC AUTO: 96 FL (ref 80–100)
MCV RBC AUTO: 96 FL (ref 80–100)
PLATELET # BLD AUTO: 207 THOU/UL (ref 140–440)
PLATELET # BLD AUTO: 207 THOU/UL (ref 140–440)
PMV BLD AUTO: 11.1 FL (ref 8.5–12.5)
POTASSIUM BLD-SCNC: 4.2 MMOL/L (ref 3.5–5)
POTASSIUM SERPL-SCNC: 4.2 MMOL/L (ref 3.5–5)
RBC # BLD AUTO: 4.28 MILL/UL (ref 3.8–5.4)
RBC # BLD AUTO: 4.28 MILL/UL (ref 3.8–5.4)
SODIUM SERPL-SCNC: 143 MMOL/L (ref 136–145)
SODIUM SERPL-SCNC: 143 MMOL/L (ref 136–145)
T4 FREE SERPL-MCNC: 0.8 NG/DL (ref 0.7–1.8)
TSH SERPL DL<=0.005 MIU/L-ACNC: 6.59 UIU/ML (ref 0.3–5)
TSH SERPL-ACNC: 6.59 UIU/ML (ref 0.3–5)
WBC # BLD AUTO: 8.3 THOU/UL (ref 4–11)
WBC: 8.3 THOU/UL (ref 4–11)

## 2021-03-12 ASSESSMENT — MIFFLIN-ST. JEOR: SCORE: 1100.56

## 2021-03-13 ENCOUNTER — NURSING HOME VISIT (OUTPATIENT)
Dept: GERIATRICS | Facility: CLINIC | Age: 71
End: 2021-03-13
Payer: COMMERCIAL

## 2021-03-13 DIAGNOSIS — R63.4 WEIGHT LOSS: ICD-10-CM

## 2021-03-13 DIAGNOSIS — G30.1 LATE ONSET ALZHEIMER'S DISEASE WITH BEHAVIORAL DISTURBANCE (H): Primary | ICD-10-CM

## 2021-03-13 DIAGNOSIS — F02.818 LATE ONSET ALZHEIMER'S DISEASE WITH BEHAVIORAL DISTURBANCE (H): Primary | ICD-10-CM

## 2021-03-13 DIAGNOSIS — R79.89 ELEVATED TSH: ICD-10-CM

## 2021-03-13 DIAGNOSIS — I10 HTN, GOAL BELOW 140/90: ICD-10-CM

## 2021-03-13 PROCEDURE — 99309 SBSQ NF CARE MODERATE MDM 30: CPT | Performed by: INTERNAL MEDICINE

## 2021-03-13 NOTE — PROGRESS NOTES
"[facility]  PHYSICIAN NOTE    Chief Complaint   Patient presents with     [facility]ulatory       HPI:    Shari Graham is a 70 year old  (1950), who is being seen today for a federally mandated E/M visit at [facility] . Admitted to LTC in Aug 2020.    She has h/o advanced dementia previously living with her  with daughter Gracie very actively involved in her life. Significant sundowning and hallucinations with increased frailty/falls and increased care needs as well as anxiety with skin picking behavior. She had increased tearfulness and was \"inconsolable\" with difficulty redirecting her at home and was admitted to [facility] at the end of August before moving to [facility]. While hospitalized, CT head unremarkable for acute findings, B12 within normal limits and medications adjusted significantly including: (Adjustment of Depakote 500 mg at bedtime to 250 mg TID, new Remeron, new Olanzapine, new Seroquel PRN, discontinuation of Trazodone); continued on 20 mg Lexapro.    She did start to settle in and had GDR of meds but then started to have escalated sundowning, wandering, agitation, and anxiety. Olanzapine 2.5 mg at noon re-initiated a week ago by PCP after discussion with Soraya's daughter who remembered that being a helpful medication for her mom when hospitalized. Labs also ordered to be thorough (TSH, T4, CBC, BMP). Weight also down about 10# since last fall (148# --> 139#) and she is now on a BID nutritional  supplement. Her Toprol was reduced in January with average vitals as follows: BP /60-80s with HR 60-90s).    In speaking with Soraya's nurse today the Olanzapine has been a VERY good addition with calming her agitation without sedation. Soraya is seen up and about in the unit this morning and is cheerful and admits to feeling \"better\" and reflects she \"wouldn't want anyone else to get it\". She has no aches/pains or " "concerns.    ALLERGIES: Patient has no known allergies.    Past Medical History:   Diagnosis Date     Dementia (H)      Hypertension      Mixed hyperlipidemia     Hyperlipidemia      CODE STATUS: DNR/I    MEDICATIONS: Reviewed and updated in [facility] according to facility MAR  Current Outpatient Medications   Medication Sig Dispense Refill     acetaminophen (TYLENOL) 325 MG tablet Take 2 tablets (650 mg) by mouth 2 times daily.       cyanocobalamin (VITAMIN B-12) 100 MCG tablet Take 100 mcg by mouth daily       divalproex sodium delayed-release (DEPAKOTE) 125 MG DR tablet Take 2 tablets (250 mg) by mouth 3 times daily       escitalopram (LEXAPRO) 20 MG tablet Take 20 mg by mouth At Bedtime       metoprolol succinate ER (TOPROL-XL) 50 MG 24 hr tablet Take 0.5 tablets (25 mg) by mouth daily 90 tablet 3     mirtazapine (REMERON) 15 MG tablet Take 1 tablet (15 mg) by mouth At Bedtime       OLANZapine (ZYPREXA) 2.5 MG tablet Take 2.5 mg by mouth daily At 1200       SENNA-docusate sodium (SENNA S) 8.6-50 MG tablet Take 1 tablet by mouth 2 times daily as needed (constipation)       simvastatin (ZOCOR) 10 MG tablet TAKE ONE TABLET BY MOUTH AT BEDTIME 90 tablet 3     Vitamin D, Cholecalciferol, 25 MCG (1000 UT) CAPS Take 1,000 Units by mouth daily         ROS:  Limited secondary to cognitive impairment but today pt reports as above in HPI    Exam:  /71   Pulse 67   Temp 97.6  F (36.4  C)   Resp 18   Ht 1.575 m (5' 2\")   Wt 63.2 kg (139 lb 6.4 oz)   SpO2 96%   BMI 25.50 kg/m    Alert, casually dressed, appears stated age  Breathing non-labored, no cough  Walking about the unit with ease  Cheerful disposition  Doesn't have much interest in chatting though and is looking forward to breakfast being served    Lab/Diagnostic Data:     Labs yesterday unremarkable other than slightly elevated TSH with normal T4             ASSESSMENT/PLAN:  Late onset Alzheimer's disease with behavioral disturbance (H)  Improved control of " "non redirectable agitation with restarting the Olanzapine one week ago at 2.5 mg at noon  PCP had discussed this with nursing staff and her daughter  Not sedated either and tolerating well with increased signs of being at ease and more comfortable  Follow over time, hold off on GDR for awhile as she failed GDR of Depakote as well as the Olanzapine (however she may have \"failed\" the Depakote GDR as she'd previously been taken off of the Olanzapine and maybe that effect was wearing off and that made it seem the Depakote GDR didn't go well)    HTN, goal below 140/90  BP well controlled after lowered dose Metoprolol XL; monitor for hypotension    Weight loss  Weight also down about 10# since last fall (148# --> 139#)  However, this could be d/t the increasing agitation she was experiencing and being off of the Olanzapine  Monitor trend on supplements and as she feels more at ease she may eat better    Elevated TSH  Minor elevation to 6.59, often associated with age, normal T4  No treatment necessary      Electronically signed by:  Lurdes Sinclair,     "

## 2021-03-13 NOTE — LETTER
"    3/13/2021        RE: Shari Graham  [facility]  [address] [facility] [address]        [place] [facility]  PHYSICIAN NOTE    Chief Complaint   Patient presents with     [facility] Regulatory       HPI:    Shari Graham is a 70 year old  (1950), who is being seen today for a federally mandated E/M visit at [facility] . Admitted to LTC in Aug 2020.    She has h/o advanced dementia previously living with her  with daughter Gracie very actively involved in her life. Significant sundowning and hallucinations with increased frailty/falls and increased care needs as well as anxiety with skin picking behavior. She had increased tearfulness and was \"inconsolable\" with difficulty redirecting her at home and was admitted to [facility] at the end of August before moving to [facility]. While hospitalized, CT head unremarkable for acute findings, B12 within normal limits and medications adjusted significantly including: (Adjustment of Depakote 500 mg at bedtime to 250 mg TID, new Remeron, new Olanzapine, new Seroquel PRN, discontinuation of Trazodone); continued on 20 mg Lexapro.    She did start to settle in and had GDR of meds but then started to have escalated sundowning, wandering, agitation, and anxiety. Olanzapine 2.5 mg at noon re-initiated a week ago by PCP after discussion with Soraya's daughter who remembered that being a helpful medication for her mom when hospitalized. Labs also ordered to be thorough (TSH, T4, CBC, BMP). Weight also down about 10# since last fall (148# --> 139#) and she is now on a BID nutritional  supplement. Her Toprol was reduced in January with average vitals as follows: BP /60-80s with HR 60-90s).    In speaking with Soraya's nurse today the Olanzapine has been a VERY good addition with calming her agitation without sedation. Soraya is seen up and about in the unit this morning and is cheerful and " "admits to feeling \"better\" and reflects she \"wouldn't want anyone else to get it\". She has no aches/pains or concerns.    ALLERGIES: Patient has no known allergies.    Past Medical History:   Diagnosis Date     Dementia (H)      Hypertension      Mixed hyperlipidemia     Hyperlipidemia      CODE STATUS: DNR/I    MEDICATIONS: Reviewed and updated in [facility] according to facility MAR  Current Outpatient Medications   Medication Sig Dispense Refill     acetaminophen (TYLENOL) 325 MG tablet Take 2 tablets (650 mg) by mouth 2 times daily.       cyanocobalamin (VITAMIN B-12) 100 MCG tablet Take 100 mcg by mouth daily       divalproex sodium delayed-release (DEPAKOTE) 125 MG DR tablet Take 2 tablets (250 mg) by mouth 3 times daily       escitalopram (LEXAPRO) 20 MG tablet Take 20 mg by mouth At Bedtime       metoprolol succinate ER (TOPROL-XL) 50 MG 24 hr tablet Take 0.5 tablets (25 mg) by mouth daily 90 tablet 3     mirtazapine (REMERON) 15 MG tablet Take 1 tablet (15 mg) by mouth At Bedtime       OLANZapine (ZYPREXA) 2.5 MG tablet Take 2.5 mg by mouth daily At 1200       SENNA-docusate sodium (SENNA S) 8.6-50 MG tablet Take 1 tablet by mouth 2 times daily as needed (constipation)       simvastatin (ZOCOR) 10 MG tablet TAKE ONE TABLET BY MOUTH AT BEDTIME 90 tablet 3     Vitamin D, Cholecalciferol, 25 MCG (1000 UT) CAPS Take 1,000 Units by mouth daily         ROS:  Limited secondary to cognitive impairment but today pt reports as above in HPI    Exam:  /71   Pulse 67   Temp 97.6  F (36.4  C)   Resp 18   Ht 1.575 m (5' 2\")   Wt 63.2 kg (139 lb 6.4 oz)   SpO2 96%   BMI 25.50 kg/m    Alert, casually dressed, appears stated age  Breathing non-labored, no cough  Walking about the unit with ease  Cheerful disposition  Doesn't have much interest in chatting though and is looking forward to breakfast being served    Lab/Diagnostic Data:     Labs yesterday unremarkable other than slightly elevated TSH with normal T4 " "            ASSESSMENT/PLAN:  Late onset Alzheimer's disease with behavioral disturbance (H)  Improved control of non redirectable agitation with restarting the Olanzapine one week ago at 2.5 mg at noon  PCP had discussed this with nursing staff and her daughter  Not sedated either and tolerating well with increased signs of being at ease and more comfortable  Follow over time, hold off on GDR for awhile as she failed GDR of Depakote as well as the Olanzapine (however she may have \"failed\" the Depakote GDR as she'd previously been taken off of the Olanzapine and maybe that effect was wearing off and that made it seem the Depakote GDR didn't go well)    HTN, goal below 140/90  BP well controlled after lowered dose Metoprolol XL; monitor for hypotension    Weight loss  Weight also down about 10# since last fall (148# --> 139#)  However, this could be d/t the increasing agitation she was experiencing and being off of the Olanzapine  Monitor trend on supplements and as she feels more at ease she may eat better    Elevated TSH  Minor elevation to 6.59, often associated with age, normal T4  No treatment necessary      Electronically signed by:  Lurdes Sinclair DO          Sincerely,        Lurdes Sinclair, DO    "

## 2021-05-06 ENCOUNTER — NURSING HOME VISIT (OUTPATIENT)
Dept: GERIATRICS | Facility: CLINIC | Age: 71
End: 2021-05-06
Payer: COMMERCIAL

## 2021-05-06 VITALS
RESPIRATION RATE: 18 BRPM | OXYGEN SATURATION: 97 % | HEART RATE: 68 BPM | WEIGHT: 141 LBS | BODY MASS INDEX: 25.79 KG/M2 | DIASTOLIC BLOOD PRESSURE: 58 MMHG | TEMPERATURE: 97.8 F | SYSTOLIC BLOOD PRESSURE: 101 MMHG

## 2021-05-06 DIAGNOSIS — G30.1 LATE ONSET ALZHEIMER'S DISEASE WITH BEHAVIORAL DISTURBANCE (H): Primary | ICD-10-CM

## 2021-05-06 DIAGNOSIS — R79.89 ELEVATED TSH: ICD-10-CM

## 2021-05-06 DIAGNOSIS — I10 ESSENTIAL HYPERTENSION: ICD-10-CM

## 2021-05-06 DIAGNOSIS — F02.818 LATE ONSET ALZHEIMER'S DISEASE WITH BEHAVIORAL DISTURBANCE (H): Primary | ICD-10-CM

## 2021-05-06 PROCEDURE — 99309 SBSQ NF CARE MODERATE MDM 30: CPT | Performed by: NURSE PRACTITIONER

## 2021-05-06 NOTE — LETTER
5/6/2021        RE: Shari Graham  [facility]  [address] [facility] [address]         [facility]  Chief Complaint   Patient presents with     [facility] Medical Record Number:  [id number]  Place of Service where encounter took place:  [facility] () [[id number]]    HPI:    Shari Graham  is 70 year old (1950), who is being seen today for a federally mandated E/M visit.     This is a 70-yea-old female, with a past medical history significant for Alzheimer's dementia, hypertension and hyperlipidemia, who was last hospitalized at [facility] 8/22/20 through 8/24/20 for increased agitation and confusion. Felt to be secondary to progressing dementia and transferred to [facility] long term care.    Today's concerns are:    Alzheimer's Dementia, Anxiety and Delusions. Today, patient is sitting in the dining room. Nonsensical answers when asked questions. References a piece of paper like map. Upon review of documentation, noted to be talkative and engaged. No behaviors documented.    Hypertension and Hyperlipidemia. Upon review of blood pressures over the past month, systolic range from 101-113. Diastolic range from 58-79.    Elevated TSH. Repeat TSH 6.59 on 3/12/21. Previous TSH 5.17 on 8/22/20.     History of Weight Loss.  Upon review of weights, 153 lbs on 8/25/20 -> 147 lbs on 12/2/20 -> 141 lbs on 1/6/21 -> 139.2 lbs on 2/3/21 -> 141 lbs on 5/6/21.    ALLERGIES:Patient has no known allergies.  PAST MEDICAL HISTORY:   Past Medical History:   Diagnosis Date     Dementia (H)      Hypertension      Mixed hyperlipidemia     Hyperlipidemia     PAST SURGICAL HISTORY:   has a past surgical history that includes surgical history of -  (grade school); hysterectomy, pap no longer indicated (1995); surgical history of -  (2011); appendectomy; and colonoscopy.  FAMILY HISTORY: family history includes Alzheimer Disease in her paternal  grandmother; Breast Cancer in her sister and sister; Cancer in her father; Diabetes in her maternal grandmother and mother; Heart Disease in her mother; Hypertension in her mother; Prostate Cancer in her brother, brother, father, and paternal grandfather; Respiratory in her father.  SOCIAL HISTORY:  reports that she quit smoking about 39 years ago. Her smoking use included cigarettes. She has a 5.50 pack-year smoking history. She has never used smokeless tobacco. She reports current alcohol use. She reports that she does not use drugs.    MEDICATIONS:     Review of your medicines          Accurate as of May 6, 2021 11:59 PM. If you have any questions, ask your nurse or doctor.            CONTINUE these medicines which have NOT CHANGED      Dose / Directions   acetaminophen 325 MG tablet  Commonly known as: TYLENOL  Used for: Pain      Take 2 tablets (650 mg) by mouth 2 times daily.  Refills: 0     cyanocobalamin 100 MCG tablet  Commonly known as: VITAMIN B-12      Dose: 100 mcg  Take 100 mcg by mouth daily  Refills: 0     divalproex sodium delayed-release 125 MG DR tablet  Commonly known as: DEPAKOTE  Used for: Late onset Alzheimer's disease with behavioral disturbance (H)      Dose: 250 mg  Take 2 tablets (250 mg) by mouth 3 times daily  Refills: 0     escitalopram 20 MG tablet  Commonly known as: LEXAPRO      Dose: 20 mg  Take 20 mg by mouth At Bedtime  Refills: 0     metoprolol succinate ER 50 MG 24 hr tablet  Commonly known as: TOPROL-XL  Used for: HTN, goal below 140/90      Dose: 25 mg  Take 0.5 tablets (25 mg) by mouth daily  Quantity: 90 tablet  Refills: 3     mirtazapine 15 MG tablet  Commonly known as: REMERON  Used for: Late onset Alzheimer's disease with behavioral disturbance (H), Anxiety      Dose: 15 mg  Take 1 tablet (15 mg) by mouth At Bedtime  Quantity:    Refills: 0     OLANZapine 2.5 MG tablet  Commonly known as: zyPREXA      Dose: 2.5 mg  Take 2.5 mg by mouth daily At 1200  Refills: 0      SENNA-docusate sodium 8.6-50 MG tablet  Commonly known as: SENNA S  Used for: Constipation, unspecified constipation type      Dose: 1 tablet  Take 1 tablet by mouth 2 times daily as needed (constipation)  Refills: 0     simvastatin 10 MG tablet  Commonly known as: ZOCOR  Used for: Hyperlipidemia LDL goal <130      TAKE ONE TABLET BY MOUTH AT BEDTIME  Quantity: 90 tablet  Refills: 3     Vitamin D (Cholecalciferol) 25 MCG (1000 UT) Caps      Dose: 1,000 Units  Take 1,000 Units by mouth daily  Refills: 0           Case Management:  I have reviewed the care plan and MDS and do agree with the plan. Patient's desire to return to the community is not present. Information reviewed:  Medications, vital signs, orders, and nursing notes.    ROS:  Unobtainable secondary to cognitive impairment.     Vitals:  /58   Pulse 68   Temp 97.8  F (36.6  C)   Resp 18   Wt 64 kg (141 lb)   SpO2 97%   BMI 25.79 kg/m    Body mass index is 25.79 kg/m .  Exam:  GENERAL APPEARANCE:  Alert, in no distress  ENT:  Mouth and posterior oropharynx normal, moist mucous membranes  EYES:  EOM, conjunctivae, lids, pupils and irises normal  RESP:  respiratory effort and palpation of chest normal, lungs clear to auscultation, no respiratory distress  CV:  Palpation and auscultation of heart done , regular rate and rhythm, no murmur, rub, or gallop  ABDOMEN:  normal bowel sounds, soft, nontender, no hepatosplenomegaly or other masses  M/S:   Active movement of bilateral upper and lower extremities.   SKIN:  Inspection of skin and subcutaneous tissue baseline, Palpation of skin and subcutaneous tissue baseline  NEURO:   Cranial nerves 2-12 are normal tested and grossly at patient's baseline  PSYCH:  Memory impaired    Lab/Diagnostic data:   Labs done in SNF are in [place] EPIC. Please refer to them using [facility] Everywhere.    ASSESSMENT/PLAN  Alzheimer's Dementia, Anxiety and Delusions. Requiring hospitalization in August for increased  agitation and confusion. Quetiapine discontinued in August with initiation of PRN in January 2021 x 14 days. Failed GDR of Divalproex in January 2021. Tapered off Olanzapine in December 2020. Re-initiated on Olanzapine on 3/4/21 due to increased behaviors. No behaviors documented in the past month. Continue Escitaloprm as ordered.      Weight Loss. Weights trending down upon admission to facility in August 2020.  Noted to have excessive wandering. Takes Mirtazapine. Supplement increased on 1/28/21. Weights stable since January 2021. Continue to monitor weights monthly. Goal of care is comfort.       Hypertension and Hyperlipidemia. Blood pressures < 140/90. Limited blood pressures available to review, but if more frequent blood pressures are obtained, consider adjusting medications if they continue to be soft. Monitor blood pressure weekly. Takes Metoprolol and Simvastatin.      Pain. Consider GDR of scheduled Acetaminophen in the future.      Elevated TSH. Likely secondary to normal aging. Will not treat. Follow-up PRN is symptomatic.      Constipation. Continue Senna-S PRN as ordered.     Orders  None    Electronically signed by:  AARON Funez CNP              Sincerely,        AARON Funez CNP

## 2021-05-06 NOTE — PROGRESS NOTES
[facility] GERIATRIC SERVICES  Chief Complaint   Patient presents with     custodial Regulatory     [place] Medical Record Number:  [id number]  Place of Service where encounter took place:  [facility] () [[id number]]    HPI:    Shari Graham  is 70 year old (1950), who is being seen today for a federally mandated E/M visit.     This is a 70-yea-old female, with a past medical history significant for Alzheimer's dementia, hypertension and hyperlipidemia, who was last hospitalized at [facility] 8/22/20 through 8/24/20 for increased agitation and confusion. Felt to be secondary to progressing dementia and transferred to [facility] long term [facility].    Today's concerns are:    Alzheimer's Dementia, Anxiety and Delusions. Today, patient is sitting in the dining room. Nonsensical answers when asked questions. References a piece of paper like map. Upon review of documentation, noted to be talkative and engaged. No behaviors documented.    Hypertension and Hyperlipidemia. Upon review of blood pressures over the past month, systolic range from 101-113. Diastolic range from 58-79.    Elevated TSH. Repeat TSH 6.59 on 3/12/21. Previous TSH 5.17 on 8/22/20.     History of Weight Loss.  Upon review of weights, 153 lbs on 8/25/20 -> 147 lbs on 12/2/20 -> 141 lbs on 1/6/21 -> 139.2 lbs on 2/3/21 -> 141 lbs on 5/6/21.    ALLERGIES:Patient has no known allergies.  PAST MEDICAL HISTORY:   Past Medical History:   Diagnosis Date     Dementia (H)      Hypertension      Mixed hyperlipidemia     Hyperlipidemia     PAST SURGICAL HISTORY:   has a past surgical history that includes surgical history of -  (grade school); hysterectomy, pap no longer indicated (1995); surgical history of -  (2011); appendectomy; and colonoscopy.  FAMILY HISTORY: family history includes Alzheimer Disease in her paternal grandmother; Breast Cancer in her sister and sister; Cancer in her father; Diabetes in her maternal  grandmother and mother; Heart Disease in her mother; Hypertension in her mother; Prostate Cancer in her brother, brother, father, and paternal grandfather; Respiratory in her father.  SOCIAL HISTORY:  reports that she quit smoking about 39 years ago. Her smoking use included cigarettes. She has a 5.50 pack-year smoking history. She has never used smokeless tobacco. She reports current alcohol use. She reports that she does not use drugs.    MEDICATIONS:     Review of your medicines          Accurate as of May 6, 2021 11:59 PM. If you have any questions, ask your nurse or doctor.            CONTINUE these medicines which have NOT CHANGED      Dose / Directions   acetaminophen 325 MG tablet  Commonly known as: TYLENOL  Used for: Pain      Take 2 tablets (650 mg) by mouth 2 times daily.  Refills: 0     cyanocobalamin 100 MCG tablet  Commonly known as: VITAMIN B-12      Dose: 100 mcg  Take 100 mcg by mouth daily  Refills: 0     divalproex sodium delayed-release 125 MG DR tablet  Commonly known as: DEPAKOTE  Used for: Late onset Alzheimer's disease with behavioral disturbance (H)      Dose: 250 mg  Take 2 tablets (250 mg) by mouth 3 times daily  Refills: 0     escitalopram 20 MG tablet  Commonly known as: LEXAPRO      Dose: 20 mg  Take 20 mg by mouth At Bedtime  Refills: 0     metoprolol succinate ER 50 MG 24 hr tablet  Commonly known as: TOPROL-XL  Used for: HTN, goal below 140/90      Dose: 25 mg  Take 0.5 tablets (25 mg) by mouth daily  Quantity: 90 tablet  Refills: 3     mirtazapine 15 MG tablet  Commonly known as: REMERON  Used for: Late onset Alzheimer's disease with behavioral disturbance (H), Anxiety      Dose: 15 mg  Take 1 tablet (15 mg) by mouth At Bedtime  Quantity:    Refills: 0     OLANZapine 2.5 MG tablet  Commonly known as: zyPREXA      Dose: 2.5 mg  Take 2.5 mg by mouth daily At 1200  Refills: 0     SENNA-docusate sodium 8.6-50 MG tablet  Commonly known as: SENNA S  Used for: Constipation, unspecified  constipation type      Dose: 1 tablet  Take 1 tablet by mouth 2 times daily as needed (constipation)  Refills: 0     simvastatin 10 MG tablet  Commonly known as: ZOCOR  Used for: Hyperlipidemia LDL goal <130      TAKE ONE TABLET BY MOUTH AT BEDTIME  Quantity: 90 tablet  Refills: 3     Vitamin D (Cholecalciferol) 25 MCG (1000 UT) Caps      Dose: 1,000 Units  Take 1,000 Units by mouth daily  Refills: 0           Case Management:  I have reviewed the care plan and MDS and do agree with the plan. Patient's desire to return to the community is not present. Information reviewed:  Medications, vital signs, orders, and nursing notes.    ROS:  Unobtainable secondary to cognitive impairment.     Vitals:  /58   Pulse 68   Temp 97.8  F (36.6  C)   Resp 18   Wt 64 kg (141 lb)   SpO2 97%   BMI 25.79 kg/m    Body mass index is 25.79 kg/m .  Exam:  GENERAL APPEARANCE:  Alert, in no distress  ENT:  Mouth and posterior oropharynx normal, moist mucous membranes  EYES:  EOM, conjunctivae, lids, pupils and irises normal  RESP:  respiratory effort and palpation of chest normal, lungs clear to auscultation, no respiratory distress  CV:  Palpation and auscultation of heart done , regular rate and rhythm, no murmur, rub, or gallop  ABDOMEN:  normal bowel sounds, soft, nontender, no hepatosplenomegaly or other masses  M/S:   Active movement of bilateral upper and lower extremities.   SKIN:  Inspection of skin and subcutaneous tissue baseline, Palpation of skin and subcutaneous tissue baseline  NEURO:   Cranial nerves 2-12 are normal tested and grossly at patient's baseline  PSYCH:  Memory impaired    Lab/Diagnostic data:   Labs done in SNF are in [facility]. Please refer to them using [facility] Everywhere.    ASSESSMENT/PLAN  Alzheimer's Dementia, Anxiety and Delusions. Requiring hospitalization in August for increased agitation and confusion. Quetiapine discontinued in August with initiation of PRN in January 2021 x 14 days.  Failed GDR of Divalproex in January 2021. Tapered off Olanzapine in December 2020. Re-initiated on Olanzapine on 3/4/21 due to increased behaviors. No behaviors documented in the past month. Continue Escitaloprm as ordered.      Weight Loss. Weights trending down upon admission to facility in August 2020.  Noted to have excessive wandering. Takes Mirtazapine. Supplement increased on 1/28/21. Weights stable since January 2021. Continue to monitor weights monthly. Goal of care is comfort.       Hypertension and Hyperlipidemia. Blood pressures < 140/90. Limited blood pressures available to review, but if more frequent blood pressures are obtained, consider adjusting medications if they continue to be soft. Monitor blood pressure weekly. Takes Metoprolol and Simvastatin.      Pain. Consider GDR of scheduled Acetaminophen in the future.      Elevated TSH. Likely secondary to normal aging. Will not treat. Follow-up PRN is symptomatic.      Constipation. Continue Senna-S PRN as ordered.     Orders  None    Electronically signed by:  AARON Funez CNP

## 2021-07-08 VITALS
HEIGHT: 62 IN | SYSTOLIC BLOOD PRESSURE: 122 MMHG | RESPIRATION RATE: 18 BRPM | BODY MASS INDEX: 26.79 KG/M2 | WEIGHT: 145.6 LBS | DIASTOLIC BLOOD PRESSURE: 75 MMHG | TEMPERATURE: 97.8 F | OXYGEN SATURATION: 94 % | HEART RATE: 59 BPM

## 2021-07-08 ASSESSMENT — MIFFLIN-ST. JEOR: SCORE: 1128.69

## 2021-07-09 ENCOUNTER — NURSING HOME VISIT (OUTPATIENT)
Dept: GERIATRICS | Facility: CLINIC | Age: 71
End: 2021-07-09
Payer: COMMERCIAL

## 2021-07-09 DIAGNOSIS — I10 HTN, GOAL BELOW 140/90: ICD-10-CM

## 2021-07-09 DIAGNOSIS — F02.818 LATE ONSET ALZHEIMER'S DISEASE WITH BEHAVIORAL DISTURBANCE (H): ICD-10-CM

## 2021-07-09 DIAGNOSIS — R00.1 BRADYCARDIA: Primary | ICD-10-CM

## 2021-07-09 DIAGNOSIS — G30.1 LATE ONSET ALZHEIMER'S DISEASE WITH BEHAVIORAL DISTURBANCE (H): ICD-10-CM

## 2021-07-09 PROCEDURE — 99309 SBSQ NF CARE MODERATE MDM 30: CPT | Performed by: INTERNAL MEDICINE

## 2021-07-09 NOTE — LETTER
7/9/2021        RE: Shari Graham  [address] [address]        [place] GERIATRIC SERVICES  PHYSICIAN NOTE    Chief Complaint   Patient presents with     [facility]PC Regulatory       HPI:    Shari Graham is a 70 year old  (1950), who is being seen today for a federally mandated E/M visit at [facility] . Admitted to LT in Aug 2020 after progressive dementia and increased care needs leading to hospitalization at [facility]. Family involved in her care. Had several medication adjustments during that hospitalization including: Adjustment of Depakote 500 mg at bedtime to 250 mg TID, new Remeron, new Olanzapine, new Seroquel PRN, discontinuation of Trazodone; continued on 20 mg Lexapro. As she settled into [facility] had GDR of some medications but then started to have escalated sundowning, wandering, agitation and anxiety. Olanzapine 2.5 mg was then reinitiated at noon starting March 2021 and was proven to be a beneficial re-addition without causing sedation.     Soraya is seen on the unit today walking around by herself but lets me join her in her walk. She talks in some word salad but seems happy to have someone talk with her. There is a group sing along on the unit but she shows no interest in it and walks around it. Allows me to listen to her heart and thanks me for the visit. She had been losing some weight but back up again now from 141 to 145 over the past few months. Nursing staff have no concerns today for me to address. She had a care conference at the end of June and staff noting stability in discussion with her family.    ALLERGIES: Patient has no known allergies.    Past Medical History:   Diagnosis Date     Dementia (H)      HTN, goal below 140/90 8/13/2012     Hyperlipidemia LDL goal <130 8/13/2012     Hypertension      Late onset Alzheimer's disease with behavioral disturbance (H) 8/14/2020     Mixed hyperlipidemia     Hyperlipidemia      CODE  "STATUS: DNR/I    MEDICATIONS: Reviewed and updated in [facility] according to facility MAR  Current Outpatient Medications   Medication Sig Dispense Refill     acetaminophen (TYLENOL) 325 MG tablet Take 2 tablets (650 mg) by mouth 2 times daily.       cyanocobalamin (VITAMIN B-12) 100 MCG tablet Take 100 mcg by mouth daily       divalproex sodium delayed-release (DEPAKOTE) 125 MG DR tablet Take 2 tablets (250 mg) by mouth 3 times daily       escitalopram (LEXAPRO) 20 MG tablet Take 20 mg by mouth At Bedtime       metoprolol succinate ER (TOPROL-XL) 50 MG 24 hr tablet Take 0.5 tablets (25 mg) by mouth daily 90 tablet 3     mirtazapine (REMERON) 15 MG tablet Take 1 tablet (15 mg) by mouth At Bedtime       OLANZapine (ZYPREXA) 2.5 MG tablet Take 2.5 mg by mouth daily At 1200       SENNA-docusate sodium (SENNA S) 8.6-50 MG tablet Take 1 tablet by mouth 2 times daily as needed (constipation)       simvastatin (ZOCOR) 10 MG tablet TAKE ONE TABLET BY MOUTH AT BEDTIME 90 tablet 3     Vitamin D, Cholecalciferol, 25 MCG (1000 UT) CAPS Take 1,000 Units by mouth daily         ROS:  Limited secondary to cognitive impairment but today pt reports as above in HPI    Exam:  /75   Pulse 59   Temp 97.8  F (36.6  C)   Resp 18   Ht 1.575 m (5' 2\")   Wt 66 kg (145 lb 9.6 oz)   SpO2 94%   BMI 26.63 kg/m    Alert, pleasant, casually dressed  Walking about the unit independently at reasonable pace without assist device despite large group sing along going on on unit  Willing to chat with me during her walk though history limited d/t word salad  Seems a bit anxious but not in overt distress  Heart regular mild juan manuel  Breathing non-labored despite fast paced gait    Lab/Diagnostic Data:    No recent labs; see my note from March with most up to date labs     ASSESSMENT/PLAN:  Bradycardia  HTN, goal below 140/90  Discontinued Toprol XL 25 mg daily d/t intermittent bradycardia and soft BPs; no high BP or HR and no need for tight rate " control nor no known CAD  Review of vitals over the past 4 months average the following:  -120/50-70s and HR 51-81    Late onset Alzheimer's disease with behavioral disturbance (H)  Continue current medications at this time; has benefited from re-addition of Olanzapine at noon starting in March and her weight is back up again  Still seems prone to anxiety/pacing so no GDR at this time though always continue to readdress           Electronically signed by:  Lurdes Sinclair, DO        Sincerely,        Lurdes Sinclair DO

## 2021-07-09 NOTE — PROGRESS NOTES
[place] GERIATRIC SERVICES  PHYSICIAN NOTE    Chief Complaint   Patient presents with     alf Regulatory       HPI:    Shari Graham is a 70 year old  (1950), who is being seen today for a federally mandated E/M visit at [facility] . Admitted to LTC in Aug 2020 after progressive dementia and increased care needs leading to hospitalization at [facility]. Family involved in her care. Had several medication adjustments during that hospitalization including: Adjustment of Depakote 500 mg at bedtime to 250 mg TID, new Remeron, new Olanzapine, new Seroquel PRN, discontinuation of Trazodone; continued on 20 mg Lexapro. As she settled into [facility] had GDR of some medications but then started to have escalated sundowning, wandering, agitation and anxiety. Olanzapine 2.5 mg was then reinitiated at noon starting March 2021 and was proven to be a beneficial re-addition without causing sedation.     Soraya is seen on the unit today walking around by herself but lets me join her in her walk. She talks in some word salad but seems happy to have someone talk with her. There is a group sing along on the unit but she shows no interest in it and walks around it. Allows me to listen to her heart and thanks me for the visit. She had been losing some weight but back up again now from 141 to 145 over the past few months. Nursing staff have no concerns today for me to address. She had a care conference at the end of June and staff noting stability in discussion with her family.    ALLERGIES: Patient has no known allergies.    Past Medical History:   Diagnosis Date     Dementia (H)      HTN, goal below 140/90 8/13/2012     Hyperlipidemia LDL goal <130 8/13/2012     Hypertension      Late onset Alzheimer's disease with behavioral disturbance (H) 8/14/2020     Mixed hyperlipidemia     Hyperlipidemia      CODE STATUS: DNR/I    MEDICATIONS: Reviewed and updated in Epic according to facility MAR  Current  "Outpatient Medications   Medication Sig Dispense Refill     acetaminophen (TYLENOL) 325 MG tablet Take 2 tablets (650 mg) by mouth 2 times daily.       cyanocobalamin (VITAMIN B-12) 100 MCG tablet Take 100 mcg by mouth daily       divalproex sodium delayed-release (DEPAKOTE) 125 MG DR tablet Take 2 tablets (250 mg) by mouth 3 times daily       escitalopram (LEXAPRO) 20 MG tablet Take 20 mg by mouth At Bedtime       metoprolol succinate ER (TOPROL-XL) 50 MG 24 hr tablet Take 0.5 tablets (25 mg) by mouth daily 90 tablet 3     mirtazapine (REMERON) 15 MG tablet Take 1 tablet (15 mg) by mouth At Bedtime       OLANZapine (ZYPREXA) 2.5 MG tablet Take 2.5 mg by mouth daily At 1200       SENNA-docusate sodium (SENNA S) 8.6-50 MG tablet Take 1 tablet by mouth 2 times daily as needed (constipation)       simvastatin (ZOCOR) 10 MG tablet TAKE ONE TABLET BY MOUTH AT BEDTIME 90 tablet 3     Vitamin D, Cholecalciferol, 25 MCG (1000 UT) CAPS Take 1,000 Units by mouth daily         ROS:  Limited secondary to cognitive impairment but today pt reports as above in HPI    Exam:  /75   Pulse 59   Temp 97.8  F (36.6  C)   Resp 18   Ht 1.575 m (5' 2\")   Wt 66 kg (145 lb 9.6 oz)   SpO2 94%   BMI 26.63 kg/m    Alert, pleasant, casually dressed  Walking about the unit independently at reasonable pace without assist device despite large group sing along going on on unit  Willing to chat with me during her walk though history limited d/t word salad  Seems a bit anxious but not in overt distress  Heart regular mild juan manuel  Breathing non-labored despite fast paced gait    Lab/Diagnostic Data:    No recent labs; see my note from March with most up to date labs     ASSESSMENT/PLAN:  Bradycardia  HTN, goal below 140/90  Discontinued Toprol XL 25 mg daily d/t intermittent bradycardia and soft BPs; no high BP or HR and no need for tight rate control nor no known CAD  Review of vitals over the past 4 months average the following:  BP " 100-120/50-70s and HR 51-81    Late onset Alzheimer's disease with behavioral disturbance (H)  Continue current medications at this time; has benefited from re-addition of Olanzapine at noon starting in March and her weight is back up again  Still seems prone to anxiety/pacing so no GDR at this time though always continue to readdress           Electronically signed by:  Lurdes Sinclair,

## 2021-07-30 ENCOUNTER — CLINICAL UPDATE (OUTPATIENT)
Dept: PHARMACY | Facility: CLINIC | Age: 71
End: 2021-07-30
Payer: COMMERCIAL

## 2021-07-30 DIAGNOSIS — F02.818 LATE ONSET ALZHEIMER'S DISEASE WITH BEHAVIORAL DISTURBANCE (H): Primary | ICD-10-CM

## 2021-07-30 DIAGNOSIS — F32.A DEPRESSION, UNSPECIFIED DEPRESSION TYPE: ICD-10-CM

## 2021-07-30 DIAGNOSIS — F41.9 ANXIETY: ICD-10-CM

## 2021-07-30 DIAGNOSIS — G30.1 LATE ONSET ALZHEIMER'S DISEASE WITH BEHAVIORAL DISTURBANCE (H): Primary | ICD-10-CM

## 2021-07-30 PROCEDURE — 99207 PR NO CHARGE LOS: CPT | Performed by: PHARMACIST

## 2021-07-30 NOTE — PROGRESS NOTES
This patient's medication list and chart were reviewed as part of the service provided by [facility] and Geriatric Services.    Assessment/Recommendations:  1. (Dementia w/ behavioral disturbance, anxiety, depression):  May be of benefit to consider reduction in escitalopram and monitor mood, anxiety.  Pt currently exceeding recommended max of 10mg daily in geriatrics due to increased risk of QTc prolongation.  Risk factors present in this patient for drug-induced QTc prolongation in addition to current escitalopram dose: age, female, bradycardia (although bradycardia may have been associated with metoprolol use, and this was recently dc'd, use of multiple meds that increase concentration of QTc prolonging meds (pt also on olanzapine, mirtazapine which may increase QTc prolongation).  Of note, however, QTc in August 2020 was 452 while on current dose of escitalopram  (although was not on mirtazapine or olanzapine at the time).  If continues on current dose of escitalopram, may be of benefit to monitor EKG/QTc periodically, and with addition of any other QTc prolonging meds.    Geri Murguia, Pharm.D.,[facility]  Board Certified Geriatric Pharmacist  Medication Therapy Management Pharmacist  [phone]

## 2021-08-03 PROBLEM — F03.90 DEMENTIA (H): Status: RESOLVED | Noted: 2020-08-22 | Resolved: 2020-08-22

## 2021-08-03 PROBLEM — F02.818 LATE ONSET ALZHEIMER'S DISEASE WITH BEHAVIORAL DISTURBANCE (H): Status: RESOLVED | Noted: 2020-08-14 | Resolved: 2020-08-22

## 2021-08-03 PROBLEM — G30.1 LATE ONSET ALZHEIMER'S DISEASE WITH BEHAVIORAL DISTURBANCE (H): Status: RESOLVED | Noted: 2020-08-14 | Resolved: 2020-08-22

## 2021-09-27 ENCOUNTER — NURSING HOME VISIT (OUTPATIENT)
Dept: GERIATRICS | Facility: CLINIC | Age: 71
End: 2021-09-27
Payer: COMMERCIAL

## 2021-09-27 VITALS
WEIGHT: 141.2 LBS | DIASTOLIC BLOOD PRESSURE: 63 MMHG | RESPIRATION RATE: 18 BRPM | OXYGEN SATURATION: 92 % | TEMPERATURE: 97.2 F | SYSTOLIC BLOOD PRESSURE: 102 MMHG | BODY MASS INDEX: 25.83 KG/M2 | HEART RATE: 73 BPM

## 2021-09-27 DIAGNOSIS — G30.1 LATE ONSET ALZHEIMER'S DISEASE WITH BEHAVIORAL DISTURBANCE (H): ICD-10-CM

## 2021-09-27 DIAGNOSIS — K59.00 CONSTIPATION, UNSPECIFIED CONSTIPATION TYPE: ICD-10-CM

## 2021-09-27 DIAGNOSIS — I10 HTN, GOAL BELOW 140/90: ICD-10-CM

## 2021-09-27 DIAGNOSIS — Z87.898 HISTORY OF WEIGHT LOSS: ICD-10-CM

## 2021-09-27 DIAGNOSIS — E78.5 HYPERLIPIDEMIA LDL GOAL <130: ICD-10-CM

## 2021-09-27 DIAGNOSIS — I10 ESSENTIAL HYPERTENSION: ICD-10-CM

## 2021-09-27 DIAGNOSIS — R79.89 ELEVATED TSH: ICD-10-CM

## 2021-09-27 DIAGNOSIS — R00.1 BRADYCARDIA: Primary | ICD-10-CM

## 2021-09-27 DIAGNOSIS — F02.818 LATE ONSET ALZHEIMER'S DISEASE WITH BEHAVIORAL DISTURBANCE (H): ICD-10-CM

## 2021-09-27 PROCEDURE — 99318 PR ANNUAL NURSING FAC ASSESSMNT, STABLE: CPT | Performed by: NURSE PRACTITIONER

## 2021-09-27 NOTE — LETTER
9/27/2021        RE: Shari Graham  [address] [facility] [address]        [facility]  Chief Complaint   Patient presents with     Annual Comprehensive Nursing Home     [facility] Medical Record Number:  [id number]  Place of Service where encounter took place:  [facility] () [[id number]]    HPI:    Shrai Graham  is a 71 year old  (1950), who is being seen today for an annual comprehensive visit. HPI information obtained from: facility chart records, facility staff, patient report and [facility] chart review and daughter, Gracie.    Background:    This is a 71-year-old female, with a past medical history significant for Alzheimer's dementia, hypertension and hyperlipidemia, who was last hospitalized at [facility] 8/22/20 through 8/24/20 for increased agitation and confusion. Felt to be secondary to progressing dementia and transferred to [facility] term [facility]    Today's concerns are:    Bradycardia. Metoprolol discontinued on 7/9/21 due to bradycardia. Upon review of heart rates over the past month, range 73-94.    Hypertension and Hyperlipidemia. Upon review of blood pressures over the past month, systolic range from 102-161, most < 130. Diastolic range from , most < 190.    Alzheimer's Dementia, Anxiety and Delusions. Per staff report, walks the halls throughout the day and evening. When she starts to get tired, droops to one side, but continues to walk. Will take short naps in the chair throughout the day. Not eating as much. Per daughter's report, patient is more anxious. Seems to be searching for something at times. Overall goal is comfort and Olanzapine seemed to make the biggest difference. Today, patient's speech is nonsensical.     Fall. On 9/26/21, attempted to sit in a chair and sat on the ground.     History of Weight Loss. Upon review of weights, 139.2 lbs on 2/3/21 -> 141.2 lbs on 9/2/21.     Pain. No reports of pain during  today's visit.      Constipation. Upon review of bowel movements over the past 5 days, having bowel movements at least daily.    ALLERGIES: Patient has no known allergies.  PAST MEDICAL HISTORY:   Past Medical History:   Diagnosis Date     Dementia (H)      HTN, goal below 140/90 8/13/2012     Hyperlipidemia LDL goal <130 8/13/2012     Hypertension      Late onset Alzheimer's disease with behavioral disturbance (H) 8/14/2020     Mixed hyperlipidemia     Hyperlipidemia      PAST SURGICAL HISTORY:  has a past surgical history that includes surgical history of -  (grade school); hysterectomy, pap no longer indicated (1995); surgical history of -  (2011); colonoscopy; and appendectomy.  IMMUNIZATIONS:  Immunization History   Administered Date(s) Administered     COVID-19,PF,Moderna 01/11/2021, 02/08/2021     Influenza (High Dose) 3 valent vaccine 10/08/2015, 09/20/2016, 10/06/2017, 10/08/2018, 10/08/2019, 10/08/2019     Influenza (IIV3) PF 10/12/2011, 09/22/2012, 10/05/2013, 09/19/2014     Influenza Vaccine IM > 6 months Valent IIV4 (Alfuria,Fluzone) 09/22/2014     Pneumo Conj 13-V (2010&after) 09/14/2015     Pneumococcal 23 valent 09/08/2014     TDAP Vaccine (Adacel) 11/14/2012     Zoster vaccine recombinant adjuvanted (SHINGRIX) 10/08/2019, 04/08/2020     Above immunizations pulled from [facility]. MIIC and facility records also reconciled. Outstanding information sent to  to update [facility].  Future immunizations are not needed at this point as all recommended immunizations are up to date.     Current Outpatient Medications:      acetaminophen (TYLENOL) 325 MG tablet, Take 2 tablets (650 mg) by mouth 2 times daily., Disp: , Rfl:      cyanocobalamin (VITAMIN B-12) 100 MCG tablet, Take 100 mcg by mouth daily, Disp: , Rfl:      divalproex sodium delayed-release (DEPAKOTE) 125 MG DR tablet, Take 2 tablets (250 mg) by mouth 3 times daily, Disp: , Rfl:      escitalopram (LEXAPRO) 20 MG tablet, Take  20 mg by mouth At Bedtime, Disp: , Rfl:      mirtazapine (REMERON) 15 MG tablet, Take 1 tablet (15 mg) by mouth At Bedtime, Disp:  , Rfl:      OLANZapine (ZYPREXA) 2.5 MG tablet, Take 2.5 mg by mouth daily At 1200 and every day PRN x 14 days, Disp: , Rfl:      SENNA-docusate sodium (SENNA S) 8.6-50 MG tablet, Take 1 tablet by mouth 2 times daily as needed (constipation), Disp: , Rfl:      simvastatin (ZOCOR) 10 MG tablet, TAKE ONE TABLET BY MOUTH AT BEDTIME, Disp: 90 tablet, Rfl: 3     Vitamin D, Cholecalciferol, 25 MCG (1000 UT) CAPS, Take 1,000 Units by mouth daily, Disp: , Rfl:      Case Management:  I have reviewed the facility/SNF care plan/MDS, including the falls risk, nutrition and pain screening. I also reviewed the current immunizations, and preventive care.. Future cancer screening is not clinically indicated secondary to age/goals of care Patient's desire to return to the community is not assessible due to cognitive impairment. Current Level of Care is appropriate.    Advance Directive Discussion:    I reviewed the current advanced directives as reflected in EPIC, the POLST and the facility chart, and verified the congruency of orders. I contacted the first party daughter, Gracie, and discussed the plan of Care.  I did not due to cognitive impairment review the advance directives with the resident. Patient's goal is pain control and comfort.    Team Discussion:  I communicated with the appropriate disciplines involved with the Plan of Care:   Nursing    Information reviewed:  Medications, vital signs, orders, and nursing notes.    ROS:  Unobtainable secondary to cognitive impairment.     Vitals:  /63   Pulse 73   Temp 97.2  F (36.2  C)   Resp 18   Wt 64 kg (141 lb 3.2 oz)   SpO2 92%   BMI 25.83 kg/m   Body mass index is 25.83 kg/m .  Exam:  GENERAL APPEARANCE:  Alert, in no distress  ENT:  Mouth and posterior oropharynx normal, moist mucous membranes  EYES:  EOM, conjunctivae, lids, pupils  and irises normal  RESP:  respiratory effort and palpation of chest normal, lungs clear to auscultation , no respiratory distress  CV:  Palpation and auscultation of heart done , regular rate and rhythm, no murmur, rub, or gallop  ABDOMEN:  normal bowel sounds, soft, nontender, no hepatosplenomegaly or other masses  M/S:   Active movement of bilateral upper and lower extremities.  SKIN:  Inspection of skin and subcutaneous tissue baseline, Palpation of skin and subcutaneous tissue baseline  NEURO:   Cranial nerves 2-12 are normal tested and grossly at patient's baseline  PSYCH:  memory impaired      Lab/Diagnostic data:   Labs done in SNF are in [facility]. Please refer to them using [employer]/Care Everywhere.    ASSESSMENT/PLAN  Alzheimer's Dementia, Anxiety and Delusions. Requiring hospitalization in August 2020 for increased agitation and confusion. Quetiapine discontinued in August 2020 with initiation of PRN in January 2021 x 14 days. Failed GDR of Divalproex in January 2021. Tapered off Olanzapine in December 2020. Re-initiated on Olanzapine on 3/4/21 due to increased behaviors. Also takes Escitalopram and Mirtazapine. Spoke to daughter, Gracie, about having Olanzapine available PRN when patient is anxious. Initially, daughter preferred not to have PRN as she questioned if it would be utilized, but was later agreeable. If consistently using medication, can increase scheduled dose. Also reviewed recommendation for EKG to check QTc given higher dose of Escitalopram, but daughter does not want this performed as overall goal is comfort. Would not change treatment plan if abnormal finding.     History of Weight Loss. Weights overall stable since March. Takes Mirtazapine. On house supplement. Goal of care is comfort.       Hypertension and Hyperlipidemia. Metoprolol discontinued on 7/9/21 due to intermittent bradycardia and soft blood pressures. Based on JNC-8 goals,  patients age of 71 year old, no presence of  diabetes or CKD, and goals of care goal BP is  <140/90 mm Hg. Patient is stable and continue without pharmacological invention with routine assessment..Takes Simvastatin, but could consider discontinuation given goals of care. Will need to re-address with daughter at a future visit.     Pain. Will not adjust Acetaminophen as goal of care is comfort.    Fall. Sat on floor. No injury.      Elevated TSH. Last TSH 6.59 on 3/12/21 which would be at goal given patient's age and co-morbidities. Will not repeat labs as goal is comfort.  Constipation. Continue Senna-S PRN as ordered.     Orders:  Olanzapine 2.5 mg PO every day PRN x 14 days    Electronically signed by:  AARON Funez CNP           Sincerely,        AARON Funez CNP

## 2021-09-27 NOTE — PROGRESS NOTES
[facility] GERIATRIC SERVICES  Chief Complaint   Patient presents with     Annual Comprehensive Nursing Home     [facility] Medical Record Number:  [id number]  Place of Service where encounter took place:  [facility] () [[id number]]    HPI:    Shari Graham  is a 71 year old  (1950), who is being seen today for an annual comprehensive visit. HPI information obtained from: facility chart records, facility staff, patient report and [facility] chart review and daughter, Gracie.    Background:    This is a 71-year-old female, with a past medical history significant for Alzheimer's dementia, hypertension and hyperlipidemia, who was last hospitalized at [facility] 8/22/20 through 8/24/20 for increased agitation and confusion. Felt to be secondary to progressing dementia and transferred to [facility] term [facility].    Today's concerns are:    Bradycardia. Metoprolol discontinued on 7/9/21 due to bradycardia. Upon review of heart rates over the past month, range 73-94.    Hypertension and Hyperlipidemia. Upon review of blood pressures over the past month, systolic range from 102-161, most < 130. Diastolic range from , most < 190.    Alzheimer's Dementia, Anxiety and Delusions. Per staff report, walks the halls throughout the day and evening. When she starts to get tired, droops to one side, but continues to walk. Will take short naps in the chair throughout the day. Not eating as much. Per daughter's report, patient is more anxious. Seems to be searching for something at times. Overall goal is comfort and Olanzapine seemed to make the biggest difference. Today, patient's speech is nonsensical.     Fall. On 9/26/21, attempted to sit in a chair and sat on the ground.     History of Weight Loss. Upon review of weights, 139.2 lbs on 2/3/21 -> 141.2 lbs on 9/2/21.     Pain. No reports of pain during today's visit.      Constipation. Upon review of bowel movements over the past 5 days,  having bowel movements at least daily.    ALLERGIES: Patient has no known allergies.  PAST MEDICAL HISTORY:   Past Medical History:   Diagnosis Date     Dementia (H)      HTN, goal below 140/90 8/13/2012     Hyperlipidemia LDL goal <130 8/13/2012     Hypertension      Late onset Alzheimer's disease with behavioral disturbance (H) 8/14/2020     Mixed hyperlipidemia     Hyperlipidemia      PAST SURGICAL HISTORY:  has a past surgical history that includes surgical history of -  (grade school); hysterectomy, pap no longer indicated (1995); surgical history of -  (2011); colonoscopy; and appendectomy.  IMMUNIZATIONS:  Immunization History   Administered Date(s) Administered     COVID-19,PF,Moderna 01/11/2021, 02/08/2021     Influenza (High Dose) 3 valent vaccine 10/08/2015, 09/20/2016, 10/06/2017, 10/08/2018, 10/08/2019, 10/08/2019     Influenza (IIV3) PF 10/12/2011, 09/22/2012, 10/05/2013, 09/19/2014     Influenza Vaccine IM > 6 months Valent IIV4 (Alfuria,Fluzone) 09/22/2014     Pneumo Conj 13-V (2010&after) 09/14/2015     Pneumococcal 23 valent 09/08/2014     TDAP Vaccine (Adacel) 11/14/2012     Zoster vaccine recombinant adjuvanted (SHINGRIX) 10/08/2019, 04/08/2020     Above immunizations pulled from [facility]. MIIC and facility records also reconciled. Outstanding information sent to  to update [facility].  Future immunizations are not needed at this point as all recommended immunizations are up to date.     Current Outpatient Medications:      acetaminophen (TYLENOL) 325 MG tablet, Take 2 tablets (650 mg) by mouth 2 times daily., Disp: , Rfl:      cyanocobalamin (VITAMIN B-12) 100 MCG tablet, Take 100 mcg by mouth daily, Disp: , Rfl:      divalproex sodium delayed-release (DEPAKOTE) 125 MG DR tablet, Take 2 tablets (250 mg) by mouth 3 times daily, Disp: , Rfl:      escitalopram (LEXAPRO) 20 MG tablet, Take 20 mg by mouth At Bedtime, Disp: , Rfl:      mirtazapine (REMERON) 15 MG tablet, Take 1  tablet (15 mg) by mouth At Bedtime, Disp:  , Rfl:      OLANZapine (ZYPREXA) 2.5 MG tablet, Take 2.5 mg by mouth daily At 1200 and every day PRN x 14 days, Disp: , Rfl:      SENNA-docusate sodium (SENNA S) 8.6-50 MG tablet, Take 1 tablet by mouth 2 times daily as needed (constipation), Disp: , Rfl:      simvastatin (ZOCOR) 10 MG tablet, TAKE ONE TABLET BY MOUTH AT BEDTIME, Disp: 90 tablet, Rfl: 3     Vitamin D, Cholecalciferol, 25 MCG (1000 UT) CAPS, Take 1,000 Units by mouth daily, Disp: , Rfl:      Case Management:  I have reviewed the facility/SNF care plan/MDS, including the falls risk, nutrition and pain screening. I also reviewed the current immunizations, and preventive care.. Future cancer screening is not clinically indicated secondary to age/goals of care Patient's desire to return to the community is not assessible due to cognitive impairment. Current Level of Care is appropriate.    Advance Directive Discussion:    I reviewed the current advanced directives as reflected in EPIC, the POLST and the facility chart, and verified the congruency of orders. I contacted the first party daughter, Gracie, and discussed the plan of Care.  I did not due to cognitive impairment review the advance directives with the resident. Patient's goal is pain control and comfort.    Team Discussion:  I communicated with the appropriate disciplines involved with the Plan of Care:   Nursing    Information reviewed:  Medications, vital signs, orders, and nursing notes.    ROS:  Unobtainable secondary to cognitive impairment.     Vitals:  /63   Pulse 73   Temp 97.2  F (36.2  C)   Resp 18   Wt 64 kg (141 lb 3.2 oz)   SpO2 92%   BMI 25.83 kg/m   Body mass index is 25.83 kg/m .  Exam:  GENERAL APPEARANCE:  Alert, in no distress  ENT:  Mouth and posterior oropharynx normal, moist mucous membranes  EYES:  EOM, conjunctivae, lids, pupils and irises normal  RESP:  respiratory effort and palpation of chest normal, lungs clear to  auscultation , no respiratory distress  CV:  Palpation and auscultation of heart done , regular rate and rhythm, no murmur, rub, or gallop  ABDOMEN:  normal bowel sounds, soft, nontender, no hepatosplenomegaly or other masses  M/S:   Active movement of bilateral upper and lower extremities.  SKIN:  Inspection of skin and subcutaneous tissue baseline, Palpation of skin and subcutaneous tissue baseline  NEURO:   Cranial nerves 2-12 are normal tested and grossly at patient's baseline  PSYCH:  memory impaired      Lab/Diagnostic data:   Labs done in SNF are in [facility]. Please refer to them using [employer]/Care Everywhere.    ASSESSMENT/PLAN  Alzheimer's Dementia, Anxiety and Delusions. Requiring hospitalization in August 2020 for increased agitation and confusion. Quetiapine discontinued in August 2020 with initiation of PRN in January 2021 x 14 days. Failed GDR of Divalproex in January 2021. Tapered off Olanzapine in December 2020. Re-initiated on Olanzapine on 3/4/21 due to increased behaviors. Also takes Escitalopram and Mirtazapine. Spoke to daughter, Gracie, about having Olanzapine available PRN when patient is anxious. Initially, daughter preferred not to have PRN as she questioned if it would be utilized, but was later agreeable. If consistently using medication, can increase scheduled dose. Also reviewed recommendation for EKG to check QTc given higher dose of Escitalopram, but daughter does not want this performed as overall goal is comfort. Would not change treatment plan if abnormal finding.     History of Weight Loss. Weights overall stable since March. Takes Mirtazapine. On house supplement. Goal of care is comfort.       Hypertension and Hyperlipidemia. Metoprolol discontinued on 7/9/21 due to intermittent bradycardia and soft blood pressures. Based on JNC-8 goals,  patients age of 71 year old, no presence of diabetes or CKD, and goals of care goal BP is  <140/90 mm Hg. Patient is stable and continue  without pharmacological invention with routine assessment..Takes Simvastatin, but could consider discontinuation given goals of care. Will need to re-address with daughter at a future visit.     Pain. Will not adjust Acetaminophen as goal of care is comfort.    Fall. Sat on floor. No injury.      Elevated TSH. Last TSH 6.59 on 3/12/21 which would be at goal given patient's age and co-morbidities. Will not repeat labs as goal is comfort.  Constipation. Continue Senna-S PRN as ordered.     Orders:  Olanzapine 2.5 mg PO every day PRN x 14 days    Electronically signed by:  AARON Funez CNP

## 2021-10-18 NOTE — TELEPHONE ENCOUNTER
Quality 110: Preventive Care And Screening: Influenza Immunization: Influenza Immunization Administered during Influenza season Reason for Call:  Medication or medication refill:    Do you use a [facility]?  Name of the pharmacy and phone number for the current request:  [facility]    Name of the medication requested: escitalopram (LEXAPRO) 10 MG tablet    Other request: Patient stated she only has 2 pills left. She needs a refill as soon as possible. Please call patient when this has been sent to the pharmacy.    Can we leave a detailed message on this number? YES    Phone number patient can be reached at: Home number on file [phone] (home)    Best Time: Anytime    Call taken on 1/23/2017 at 8:11 AM by Heike Meyers       Detail Level: Generalized Quality 111:Pneumonia Vaccination Status For Older Adults: Pneumococcal Vaccination Previously Received

## 2021-10-23 ENCOUNTER — HEALTH MAINTENANCE LETTER (OUTPATIENT)
Age: 71
End: 2021-10-23

## 2021-10-27 ENCOUNTER — LAB REQUISITION (OUTPATIENT)
Dept: LAB | Facility: CLINIC | Age: 71
End: 2021-10-27
Payer: COMMERCIAL

## 2021-10-27 DIAGNOSIS — I10 ESSENTIAL (PRIMARY) HYPERTENSION: ICD-10-CM

## 2021-10-28 PROCEDURE — 80048 BASIC METABOLIC PNL TOTAL CA: CPT | Mod: ORL | Performed by: NURSE PRACTITIONER

## 2021-10-28 PROCEDURE — P9603 ONE-WAY ALLOW PRORATED MILES: HCPCS | Mod: ORL | Performed by: NURSE PRACTITIONER

## 2021-10-28 PROCEDURE — 36415 COLL VENOUS BLD VENIPUNCTURE: CPT | Mod: ORL | Performed by: NURSE PRACTITIONER

## 2021-11-01 LAB
ANION GAP SERPL CALCULATED.3IONS-SCNC: 11 MMOL/L (ref 5–18)
BUN SERPL-MCNC: 19 MG/DL (ref 8–28)
CALCIUM SERPL-MCNC: 9.6 MG/DL (ref 8.5–10.5)
CHLORIDE BLD-SCNC: 106 MMOL/L (ref 98–107)
CO2 SERPL-SCNC: 27 MMOL/L (ref 22–31)
CREAT SERPL-MCNC: 0.7 MG/DL (ref 0.6–1.1)
GFR SERPL CREATININE-BSD FRML MDRD: 87 ML/MIN/1.73M2
GLUCOSE BLD-MCNC: 93 MG/DL (ref 70–125)
POTASSIUM BLD-SCNC: 4.3 MMOL/L (ref 3.5–5)
SODIUM SERPL-SCNC: 144 MMOL/L (ref 136–145)

## 2021-11-10 ENCOUNTER — LAB REQUISITION (OUTPATIENT)
Dept: LAB | Facility: CLINIC | Age: 71
End: 2021-11-10
Payer: COMMERCIAL

## 2021-11-10 DIAGNOSIS — I50.9 HEART FAILURE, UNSPECIFIED (H): ICD-10-CM

## 2021-11-11 PROCEDURE — 80048 BASIC METABOLIC PNL TOTAL CA: CPT | Mod: ORL | Performed by: INTERNAL MEDICINE

## 2021-11-11 PROCEDURE — 36415 COLL VENOUS BLD VENIPUNCTURE: CPT | Mod: ORL | Performed by: INTERNAL MEDICINE

## 2021-11-12 LAB
ANION GAP SERPL CALCULATED.3IONS-SCNC: 10 MMOL/L (ref 5–18)
BUN SERPL-MCNC: 21 MG/DL (ref 8–28)
CALCIUM SERPL-MCNC: 9.5 MG/DL (ref 8.5–10.5)
CHLORIDE BLD-SCNC: 107 MMOL/L (ref 98–107)
CO2 SERPL-SCNC: 26 MMOL/L (ref 22–31)
CREAT SERPL-MCNC: 0.67 MG/DL (ref 0.6–1.1)
GFR SERPL CREATININE-BSD FRML MDRD: 88 ML/MIN/1.73M2
GLUCOSE BLD-MCNC: 94 MG/DL (ref 70–125)
POTASSIUM BLD-SCNC: 4.1 MMOL/L (ref 3.5–5)
SODIUM SERPL-SCNC: 143 MMOL/L (ref 136–145)

## 2021-11-22 ENCOUNTER — NURSING HOME VISIT (OUTPATIENT)
Dept: GERIATRICS | Facility: CLINIC | Age: 71
End: 2021-11-22
Payer: COMMERCIAL

## 2021-11-22 VITALS
BODY MASS INDEX: 22.26 KG/M2 | DIASTOLIC BLOOD PRESSURE: 70 MMHG | OXYGEN SATURATION: 95 % | RESPIRATION RATE: 17 BRPM | HEART RATE: 79 BPM | WEIGHT: 133.6 LBS | SYSTOLIC BLOOD PRESSURE: 128 MMHG | TEMPERATURE: 97.8 F | HEIGHT: 65 IN

## 2021-11-22 DIAGNOSIS — F41.9 ANXIETY: Primary | ICD-10-CM

## 2021-11-22 DIAGNOSIS — F03.90 DEMENTIA WITHOUT BEHAVIORAL DISTURBANCE, UNSPECIFIED DEMENTIA TYPE: ICD-10-CM

## 2021-11-22 DIAGNOSIS — R63.4 WEIGHT LOSS: ICD-10-CM

## 2021-11-22 PROCEDURE — 99309 SBSQ NF CARE MODERATE MDM 30: CPT | Performed by: INTERNAL MEDICINE

## 2021-11-22 ASSESSMENT — MIFFLIN-ST. JEOR: SCORE: 1116.89

## 2021-11-22 NOTE — LETTER
11/22/2021        RE: Shari Graham  [address] [facility] [address]        [facility] GERIATRIC SERVICES  PHYSICIAN NOTE    Chief Complaint   Patient presents with     [facility] Regulatory       HPI:    Shari Graham is a 71 year old  (1950), who is being seen today for a federally mandated E/M visit at [facility] .  Admitted to LT in Aug 2020 after progressive dementia and increased care needs leading to hospitalization at [facility]. Family involved in her care. Had several medication adjustments during that hospitalization including: Adjustment of Depakote 500 mg at bedtime to 250 mg TID, new Remeron, new Olanzapine, new Seroquel PRN, discontinuation of Trazodone; continued on 20 mg Lexapro. As she settled into [facility] had GDR of some medications but then started to have escalated sundowning, wandering, agitation and anxiety. Olanzapine 2.5 mg was then reinitiated at noon starting March 2021 and was proven to be a beneficial re-addition without causing sedation.     Early October PCP had discussion with Soraya's daughter Gracie re: trying PRN Olanzapine d/t signs of increased anxiety and pacing and less oral intake. Weight down now from 140s to 133. The PRN was only utilized once on my review of MAR and then fell off d/t 14 day limit. In talking with nurse manager, she acknowledged the signs of anxiety in that Soraya tends to pace throughout the day and really get tired out. Has had a couple non-injurious falls (11/13 and 9/28). Does tend to sleep well at night. Today, Soraya was sitting down in common area when I visited and a male resident was talking with her who resides on the memory care unit as well. He believed that he was Soraya's boyfriend. He kept trying to talk with her though she was sleepy and answered questions in word salad. Eventually she got up and started walking and he followed her. Staff were able to redirect him away from her.    ALLERGIES: Patient  "has no known allergies.    Past Medical History:   Diagnosis Date     Dementia (H)      HTN, goal below 140/90 8/13/2012     Hyperlipidemia LDL goal <130 8/13/2012     Hypertension      Late onset Alzheimer's disease with behavioral disturbance (H) 8/14/2020     Mixed hyperlipidemia     Hyperlipidemia      CODE STATUS: DNR/I    MEDICATIONS: Reviewed and updated in [facility] according to facility MAR  Current Outpatient Medications   Medication Sig Dispense Refill     acetaminophen (TYLENOL) 325 MG tablet Take 2 tablets (650 mg) by mouth 2 times daily.       cyanocobalamin (VITAMIN B-12) 100 MCG tablet Take 100 mcg by mouth daily       divalproex sodium delayed-release (DEPAKOTE) 125 MG DR tablet Take 2 tablets (250 mg) by mouth 3 times daily       escitalopram (LEXAPRO) 20 MG tablet Take 20 mg by mouth At Bedtime       mirtazapine (REMERON) 15 MG tablet Take 1 tablet (15 mg) by mouth At Bedtime       OLANZapine (ZYPREXA) 2.5 MG tablet Take 2.5 mg by mouth daily at 1200       SENNA-docusate sodium (SENNA S) 8.6-50 MG tablet Take 1 tablet by mouth 2 times daily as needed (constipation)       simvastatin (ZOCOR) 10 MG tablet TAKE ONE TABLET BY MOUTH AT BEDTIME 90 tablet 3     Vitamin D, Cholecalciferol, 25 MCG (1000 UT) CAPS Take 1,000 Units by mouth daily         ROS:  Limited secondary to cognitive impairment but today pt reports as above in HPI    Exam:  /70   Pulse 79   Temp 97.8  F (36.6  C)   Resp 17   Ht 1.651 m (5' 5\")   Wt 60.6 kg (133 lb 9.6 oz)   SpO2 95%   BMI 22.23 kg/m    Sleepy today, sitting up in chair, casually dressed  Breathing non-labored  Able to get up independently and walk without assist device  Word salad noted; does make eye contact with me and doesn't seem to want to interact with the male resident    Lab/Diagnostic Data:    BMP within normal limits 11/11/21    ASSESSMENT/PLAN:  (F41.9) Anxiety  (primary encounter diagnosis)  (F03.90) Dementia without behavioral disturbance, " "unspecified dementia type (H)  (R63.4) Weight loss  Reports of increased anxiety again and pacing (though always has done this to some degree since admit), now starting to lose some weight again  Having more difficulty with communication as well d/t her advancing dementia  Goals of care are comfort based  Earlier this year she had failed GDR of Depakote as well as the Olanzapine (however she may have \"failed\" the Depakote GDR as she'd previously been taken off of the Olanzapine first and maybe that effect was wearing off and that made it seem the Depakote GDR didn't go well)  Resumption of Olanzapine 2.5 mg daily at noon in March 2021 seemed to help for awhile without being overly sedated but today (I'm seeing her about 2 pm) she is quite sleepy though still able to get up and walk about the unit  A trial of PRN Olanzapine last month doesn't really give us any information as the PRN was only utilized once before it fell off of MAR  Sleeps well at night, have to carefully balance meds and avoid over sedation as she does try to be quite active but hopefully can calm some of her anxieties/pacing  Follow weight trend, if continues down, consider discussion with family re: hospice  Call to daughter Gracie and I left VM welcoming her to call me back to get her input on the level of anxiety she sees in her mom when she visits  I'm wondering about potentially adding in an Olanzapine 2.5 mg dose around 4 pm with attempt to reduce her Depakote which may not be helping as much as historically she responds better to the Olanzapine  Continue great nursing staff support and cares    Electronically signed by:  Lurdes Sinclair DO        Sincerely,        Lurdes Sinclair, DO    "

## 2021-11-23 NOTE — PROGRESS NOTES
[facility]  PHYSICIAN NOTE    Chief Complaint   Patient presents with     alf Regulatory       HPI:    Shari Graham is a 71 year old  (1950), who is being seen today for a federally mandated E/M visit at [facility] .  Admitted to LTC in Aug 2020 after progressive dementia and increased care needs leading to hospitalization at [facility]. Family involved in her care. Had several medication adjustments during that hospitalization including: Adjustment of Depakote 500 mg at bedtime to 250 mg TID, new Remeron, new Olanzapine, new Seroquel PRN, discontinuation of Trazodone; continued on 20 mg Lexapro. As she settled into [facility] had GDR of some medications but then started to have escalated sundowning, wandering, agitation and anxiety. Olanzapine 2.5 mg was then reinitiated at noon starting March 2021 and was proven to be a beneficial re-addition without causing sedation.     Early October PCP had discussion with Soraya's daughter Gracie re: trying PRN Olanzapine d/t signs of increased anxiety and pacing and less oral intake. Weight down now from 140s to 133. The PRN was only utilized once on my review of MAR and then fell off d/t 14 day limit. In talking with nurse manager, she acknowledged the signs of anxiety in that Soraya tends to pace throughout the day and really get tired out. Has had a couple non-injurious falls (11/13 and 9/28). Does tend to sleep well at night. Today, Soraya was sitting down in common area when I visited and a male resident was talking with her who resides on the memory care unit as well. He believed that he was Soraya's boyfriend. He kept trying to talk with her though she was sleepy and answered questions in word salad. Eventually she got up and started walking and he followed her. Staff were able to redirect him away from her.    ALLERGIES: Patient has no known allergies.    Past Medical History:   Diagnosis Date     Dementia (H)       "HTN, goal below 140/90 8/13/2012     Hyperlipidemia LDL goal <130 8/13/2012     Hypertension      Late onset Alzheimer's disease with behavioral disturbance (H) 8/14/2020     Mixed hyperlipidemia     Hyperlipidemia      CODE STATUS: DNR/I    MEDICATIONS: Reviewed and updated in [facility] according to facility MAR  Current Outpatient Medications   Medication Sig Dispense Refill     acetaminophen (TYLENOL) 325 MG tablet Take 2 tablets (650 mg) by mouth 2 times daily.       cyanocobalamin (VITAMIN B-12) 100 MCG tablet Take 100 mcg by mouth daily       divalproex sodium delayed-release (DEPAKOTE) 125 MG DR tablet Take 2 tablets (250 mg) by mouth 3 times daily       escitalopram (LEXAPRO) 20 MG tablet Take 20 mg by mouth At Bedtime       mirtazapine (REMERON) 15 MG tablet Take 1 tablet (15 mg) by mouth At Bedtime       OLANZapine (ZYPREXA) 2.5 MG tablet Take 2.5 mg by mouth daily at 1200       SENNA-docusate sodium (SENNA S) 8.6-50 MG tablet Take 1 tablet by mouth 2 times daily as needed (constipation)       simvastatin (ZOCOR) 10 MG tablet TAKE ONE TABLET BY MOUTH AT BEDTIME 90 tablet 3     Vitamin D, Cholecalciferol, 25 MCG (1000 UT) CAPS Take 1,000 Units by mouth daily         ROS:  Limited secondary to cognitive impairment but today pt reports as above in HPI    Exam:  /70   Pulse 79   Temp 97.8  F (36.6  C)   Resp 17   Ht 1.651 m (5' 5\")   Wt 60.6 kg (133 lb 9.6 oz)   SpO2 95%   BMI 22.23 kg/m    Sleepy today, sitting up in chair, casually dressed  Breathing non-labored  Able to get up independently and walk without assist device  Word salad noted; does make eye contact with me and doesn't seem to want to interact with the male resident    Lab/Diagnostic Data:    BMP within normal limits 11/11/21    ASSESSMENT/PLAN:  (F41.9) Anxiety  (primary encounter diagnosis)  (F03.90) Dementia without behavioral disturbance, unspecified dementia type (H)  (R63.4) Weight loss  Reports of increased anxiety again and " "pacing (though always has done this to some degree since admit), now starting to lose some weight again  Having more difficulty with communication as well d/t her advancing dementia  Goals of care are comfort based  Earlier this year she had failed GDR of Depakote as well as the Olanzapine (however she may have \"failed\" the Depakote GDR as she'd previously been taken off of the Olanzapine first and maybe that effect was wearing off and that made it seem the Depakote GDR didn't go well)  Resumption of Olanzapine 2.5 mg daily at noon in March 2021 seemed to help for awhile without being overly sedated but today (I'm seeing her about 2 pm) she is quite sleepy though still able to get up and walk about the unit  A trial of PRN Olanzapine last month doesn't really give us any information as the PRN was only utilized once before it fell off of MAR  Sleeps well at night, have to carefully balance meds and avoid over sedation as she does try to be quite active but hopefully can calm some of her anxieties/pacing  Follow weight trend, if continues down, consider discussion with family re: hospice  Call to daughter Gracie and I left VM welcoming her to call me back to get her input on the level of anxiety she sees in her mom when she visits  I'm wondering about potentially adding in an Olanzapine 2.5 mg dose around 4 pm with attempt to reduce her Depakote which may not be helping as much as historically she responds better to the Olanzapine  Continue great nursing staff support and cares    Electronically signed by:  Lurdes Sinclair, DO  "

## 2021-11-24 ENCOUNTER — TELEPHONE (OUTPATIENT)
Dept: GERIATRICS | Facility: CLINIC | Age: 71
End: 2021-11-24
Payer: COMMERCIAL

## 2021-11-24 RX ORDER — OLANZAPINE 2.5 MG/1
2.5 TABLET, FILM COATED ORAL 2 TIMES DAILY
COMMUNITY

## 2021-11-24 NOTE — TELEPHONE ENCOUNTER
Call back from Soraya's daughter Gracie re: anxiety and increased pacing especially later in the day. Goals of care are comfort based; has known h/o depression and anxiety on top of her dementia. Gracie tends to visit earlier in the day and her mom is more calm then, maybe slightly sad at times. But staff see the sundowning/pacing escalate later in the day. Historically Gracie remembered her mom responding well to Zyprexa even when she was still living at home.     PLAN: Continue Zyprexa 2.5 mg daily at noon with an added dose of Zyprexa 2.5 mg at 4 pm. Collaborated with RN on this. Eventually, Gracie in agreement to taper Depakote (unsure this one is as helpful as the Zyprexa historically has been) but plan to wait until after the holidays likely into the new year. She'd want to be updated when Depakote taper starts. Of course continue great nursing support and cares with reassurance.    Lurdes Sinclair, DO

## 2021-12-20 ENCOUNTER — NURSING HOME VISIT (OUTPATIENT)
Dept: GERIATRICS | Facility: CLINIC | Age: 71
End: 2021-12-20
Payer: COMMERCIAL

## 2021-12-20 VITALS
DIASTOLIC BLOOD PRESSURE: 62 MMHG | WEIGHT: 130.4 LBS | OXYGEN SATURATION: 96 % | TEMPERATURE: 97.6 F | BODY MASS INDEX: 21.73 KG/M2 | HEIGHT: 65 IN | HEART RATE: 78 BPM | RESPIRATION RATE: 20 BRPM | SYSTOLIC BLOOD PRESSURE: 124 MMHG

## 2021-12-20 DIAGNOSIS — R63.4 WEIGHT LOSS: ICD-10-CM

## 2021-12-20 DIAGNOSIS — R29.6 RECURRENT FALLS: ICD-10-CM

## 2021-12-20 DIAGNOSIS — F03.90 DEMENTIA WITHOUT BEHAVIORAL DISTURBANCE, UNSPECIFIED DEMENTIA TYPE: ICD-10-CM

## 2021-12-20 DIAGNOSIS — F41.9 ANXIETY: Primary | ICD-10-CM

## 2021-12-20 PROCEDURE — 99309 SBSQ NF CARE MODERATE MDM 30: CPT | Performed by: INTERNAL MEDICINE

## 2021-12-20 ASSESSMENT — MIFFLIN-ST. JEOR: SCORE: 1102.37

## 2021-12-20 NOTE — LETTER
"    12/20/2021        RE: Shari Graham  [address] [id number]        [facility]  PHYSICIAN NOTE    Chief Complaint   Patient presents with     Nursing Home Acute; medication recheck on anxiety       HPI:    Shari Graham is a 71 year old  (1950), who is being seen today for an episodic visit at [facility] . Admitted to LT in Aug 2020 with progressive dementia. A month ago, after discussing with staff and Soraya's daughter Gracie, decision was made to add a 4pm dose of Zyprexa to currently scheduled noon dose; each 2.5 mg in attempt to calm some worsening perceived anxiety with pacing. I'm following up on this today.  Actually, per staff here today, they feel Soraya is relatively stable since admission and see no difference in her level of anxiety/pacing nor any excess sedation. They do see that its harder for her to focus to eat and is losing a little bit of weight. She has also had a couple of falls seemingly out of bed found on the floor without injuries.  I was able to talk with Soraya and walked with her about the unit as she paced. I tried to ask questions in different ways to see for responses. She does have word salad and never could answer questions re: if she was experiencing any pain. Did seem to be muttering some anxious thoughts re: someone but no specifics could be elicited. I was able to direct her on our walk to look outside and see the jaja day and snow on the ground and she paused for only a few moments and said \"its beautiful!\" and then continued to walk onwards. Later after our visit I was able to have her sit in a chair in common area as I spoke with nursing staff and she seemed content for several minutes sitting.  Unfortunately after I had left the facility, I received a call about 3:15 pm from nursing staff noting that she'd had another fall. She tried to step around a wheelchair when she lost her balance and fell backwards and her " "back of the head hit the low door on nurses station. No immediate injuries, goose egg or bleeding. VSS, neuros intact. No signs of obvious distress per nursing staff. They'll continue their post-fall protocol.  I did then call her daughter Grcaie re: fall and her perception of her mom's anxiety since the dose adjustment of Zyprexa. She tends to visit in AMs and will be there again tomorrow to check in. AMs tend to be least anxious time of day for Soraya and Gracie hasn't perceived any better or worse in terms of anxiety; no changes overall either and no sedation. She mentioned understanding about her mom's progressive disease, how hard it is to see, goals of care being comfort based and asked questions re: progression and we discussed the FAST scale. She also mentioned that her mom had been having falls even before she moved to LTC so this was not entirely new to her.   Also, per dietary: \"Wts- grad decrease of 7.4# in 6 months= not significant. Appetite= varies from poor to fair. Accepts some of the house supplement. Feeding ability varies from being able to feed self with set up, enc to keep eating to needing assist. Does not lose liquids or solids from mouth when eating or drinking. Does not hold food in mouth or cheeks or residual food in mouth after meals. Does not cough or choke at meals or when swallowing meds. Does not complain of pain or difficulty with swallowing.\"    ALLERGIES: Patient has no known allergies.    Past Medical History:   Diagnosis Date     Dementia (H)      HTN, goal below 140/90 8/13/2012     Hyperlipidemia LDL goal <130 8/13/2012     Hypertension      Late onset Alzheimer's disease with behavioral disturbance (H) 8/14/2020     Mixed hyperlipidemia     Hyperlipidemia      CODE STATUS: DNR/I    MEDICATIONS: Reviewed and updated in [facility] according to facility MAR  Current Outpatient Medications   Medication Sig Dispense Refill     acetaminophen (TYLENOL) 325 MG tablet Take 2 tablets (650 mg) by " "mouth 2 times daily.       cyanocobalamin (VITAMIN B-12) 100 MCG tablet Take 100 mcg by mouth daily       divalproex sodium delayed-release (DEPAKOTE) 125 MG DR tablet Take 2 tablets (250 mg) by mouth 3 times daily       escitalopram (LEXAPRO) 20 MG tablet Take 20 mg by mouth At Bedtime       mirtazapine (REMERON) 15 MG tablet Take 1 tablet (15 mg) by mouth At Bedtime       OLANZapine (ZYPREXA) 2.5 MG tablet Take 1 tablet (2.5 mg) by mouth 2 times daily At 12 PM and 4 PM       SENNA-docusate sodium (SENNA S) 8.6-50 MG tablet Take 1 tablet by mouth 2 times daily as needed (constipation)       simvastatin (ZOCOR) 10 MG tablet TAKE ONE TABLET BY MOUTH AT BEDTIME 90 tablet 3     Vitamin D, Cholecalciferol, 25 MCG (1000 UT) CAPS Take 1,000 Units by mouth daily         ROS:  Limited secondary to cognitive impairment but today pt reports as above in HPI    Exam:  /62   Pulse 78   Temp 97.6  F (36.4  C)   Resp 20   Ht 1.651 m (5' 5\")   Wt 59.1 kg (130 lb 6.4 oz)   SpO2 96%   BMI 21.70 kg/m    Alert, casually dressed, walking about the unit at decent pace without assist device  Has tenancy to lean to one side, does not appear to limp or be in pain  Decent heel-toe motion but decreased bilateral arm swing  Word salad noted  Limited emotion/inflection in her voice  Is able to be easily re-directed  Allowed me to listen to her heart and it was regular rate and rhythm    Lab/Diagnostic Data:    BMP unremarkable Nov 2021 and Hgb normal at 13.1 in March 2021    ASSESSMENT/PLAN:  (F41.9) Anxiety  (primary encounter diagnosis)  (F03.90) Dementia without behavioral disturbance, unspecified dementia type (H)  (R63.4) Weight loss  (R29.6) Recurrent falls  See HPI re: discussion with Soraya, nursing staff and her daughter Gracie re: progressive dementia with anxiety and recurrent falls  Today's overall impression is she is unchanged since 4 pm dose of Zyprexa was added a month ago  She remains on several agents as noted above " in medication list  Goals of care are comfort based per family; does tend to historically respond better to Zyprexa and have considered tapering her off of Depakote  Its true she is less anxious in AM but afternoons are harder with more anxiety and pacing  No current signs of excess sedation but is possibly having more falls than before though has had previous history of falls  Unsure she'd reliably use a walker as would likely forget  Impaired gait and balance are part of the dementia disease  Mild slow weight loss as noted above by dietician  For now, planned to continue current medications, see if any ramifications from current fall and continue to monitor  Continue fall risk reduction strategies but its hard with her dementia; falls have occurred at various times of day  Orthostatics not able to be found in charting but not on anti-hypertensives and other VSS  Future plans could be to increase Zyprexa further ex: 5 mg at noon and taper Depakote but keep daughter Gracie involved in all decisions as that is very appreciated       Electronically signed by:  Lurdes Sinclair, DO        Sincerely,        Lurdes Sinclair, DO

## 2021-12-20 NOTE — PROGRESS NOTES
"[facility]  PHYSICIAN NOTE    Chief Complaint   Patient presents with     Nursing Home Acute; medication recheck on anxiety       HPI:    Shari Graham is a 71 year old  (1950), who is being seen today for an episodic visit at [facility] . Admitted to LT in Aug 2020 with progressive dementia. A month ago, after discussing with staff and Soraya's daughter Gracie, decision was made to add a 4pm dose of Zyprexa to currently scheduled noon dose; each 2.5 mg in attempt to calm some worsening perceived anxiety with pacing. I'm following up on this today.  Actually, per staff here today, they feel Soraya is relatively stable since admission and see no difference in her level of anxiety/pacing nor any excess sedation. They do see that its harder for her to focus to eat and is losing a little bit of weight. She has also had a couple of falls seemingly out of bed found on the floor without injuries.  I was able to talk with Soraya and walked with her about the unit as she paced. I tried to ask questions in different ways to see for responses. She does have word salad and never could answer questions re: if she was experiencing any pain. Did seem to be muttering some anxious thoughts re: someone but no specifics could be elicited. I was able to direct her on our walk to look outside and see the jaja day and snow on the ground and she paused for only a few moments and said \"its beautiful!\" and then continued to walk onwards. Later after our visit I was able to have her sit in a chair in common area as I spoke with nursing staff and she seemed content for several minutes sitting.  Unfortunately after I had left the facility, I received a call about 3:15 pm from nursing staff noting that she'd had another fall. She tried to step around a wheelchair when she lost her balance and fell backwards and her back of the head hit the low door on nurses station. No immediate injuries, goose egg or " "bleeding. VSS, neuros intact. No signs of obvious distress per nursing staff. They'll continue their post-fall protocol.  I did then call her daughter Gracie re: fall and her perception of her mom's anxiety since the dose adjustment of Zyprexa. She tends to visit in AMs and will be there again tomorrow to check in. AMs tend to be least anxious time of day for Soraya and Gracie hasn't perceived any better or worse in terms of anxiety; no changes overall either and no sedation. She mentioned understanding about her mom's progressive disease, how hard it is to see, goals of care being comfort based and asked questions re: progression and we discussed the FAST scale. She also mentioned that her mom had been having falls even before she moved to LTC so this was not entirely new to her.   Also, per dietary: \"Wts- grad decrease of 7.4# in 6 months= not significant. Appetite= varies from poor to fair. Accepts some of the house supplement. Feeding ability varies from being able to feed self with set up, enc to keep eating to needing assist. Does not lose liquids or solids from mouth when eating or drinking. Does not hold food in mouth or cheeks or residual food in mouth after meals. Does not cough or choke at meals or when swallowing meds. Does not complain of pain or difficulty with swallowing.\"    ALLERGIES: Patient has no known allergies.    Past Medical History:   Diagnosis Date     Dementia (H)      HTN, goal below 140/90 8/13/2012     Hyperlipidemia LDL goal <130 8/13/2012     Hypertension      Late onset Alzheimer's disease with behavioral disturbance (H) 8/14/2020     Mixed hyperlipidemia     Hyperlipidemia      CODE STATUS: DNR/I    MEDICATIONS: Reviewed and updated in [facility] according to facility MAR  Current Outpatient Medications   Medication Sig Dispense Refill     acetaminophen (TYLENOL) 325 MG tablet Take 2 tablets (650 mg) by mouth 2 times daily.       cyanocobalamin (VITAMIN B-12) 100 MCG tablet Take 100 mcg by " "mouth daily       divalproex sodium delayed-release (DEPAKOTE) 125 MG DR tablet Take 2 tablets (250 mg) by mouth 3 times daily       escitalopram (LEXAPRO) 20 MG tablet Take 20 mg by mouth At Bedtime       mirtazapine (REMERON) 15 MG tablet Take 1 tablet (15 mg) by mouth At Bedtime       OLANZapine (ZYPREXA) 2.5 MG tablet Take 1 tablet (2.5 mg) by mouth 2 times daily At 12 PM and 4 PM       SENNA-docusate sodium (SENNA S) 8.6-50 MG tablet Take 1 tablet by mouth 2 times daily as needed (constipation)       simvastatin (ZOCOR) 10 MG tablet TAKE ONE TABLET BY MOUTH AT BEDTIME 90 tablet 3     Vitamin D, Cholecalciferol, 25 MCG (1000 UT) CAPS Take 1,000 Units by mouth daily         ROS:  Limited secondary to cognitive impairment but today pt reports as above in HPI    Exam:  /62   Pulse 78   Temp 97.6  F (36.4  C)   Resp 20   Ht 1.651 m (5' 5\")   Wt 59.1 kg (130 lb 6.4 oz)   SpO2 96%   BMI 21.70 kg/m    Alert, casually dressed, walking about the unit at decent pace without assist device  Has tenancy to lean to one side, does not appear to limp or be in pain  Decent heel-toe motion but decreased bilateral arm swing  Word salnaomy noted  Limited emotion/inflection in her voice  Is able to be easily re-directed  Allowed me to listen to her heart and it was regular rate and rhythm    Lab/Diagnostic Data:    BMP unremarkable Nov 2021 and Hgb normal at 13.1 in March 2021    ASSESSMENT/PLAN:  (F41.9) Anxiety  (primary encounter diagnosis)  (F03.90) Dementia without behavioral disturbance, unspecified dementia type (H)  (R63.4) Weight loss  (R29.6) Recurrent falls  See HPI re: discussion with Soraya, nursing staff and her daughter Gracie re: progressive dementia with anxiety and recurrent falls  Today's overall impression is she is unchanged since 4 pm dose of Zyprexa was added a month ago  She remains on several agents as noted above in medication list  Goals of care are comfort based per family; does tend to " historically respond better to Zyprexa and have considered tapering her off of Depakote  Its true she is less anxious in AM but afternoons are harder with more anxiety and pacing  No current signs of excess sedation but is possibly having more falls than before though has had previous history of falls  Unsure she'd reliably use a walker as would likely forget  Impaired gait and balance are part of the dementia disease  Mild slow weight loss as noted above by dietician  For now, planned to continue current medications, see if any ramifications from current fall and continue to monitor  Continue fall risk reduction strategies but its hard with her dementia; falls have occurred at various times of day  Orthostatics not able to be found in charting but not on anti-hypertensives and other VSS  Future plans could be to increase Zyprexa further ex: 5 mg at noon and taper Depakote but keep daughter Gracie involved in all decisions as that is very appreciated       Electronically signed by:  Lurdes Sinclair, DO

## 2021-12-20 NOTE — Clinical Note
Leeanna, no changes made today. Essentially still seems anxious though staff today feel its the same level of anxiety she always has had. I guess I agree - she was walking about the unit throughout my visit. Daughter hasn't seen changes good or bad since additional Zyprexa added but visits in mornings only. Additionally I got a call after I had left that she had another fall today - tried to step around a WC and lost balance and hit head on short door to nursing station. Stable at this time and they'll monitor closely. Daughter very understanding noting frequent falls when she was at home too.

## 2022-01-10 VITALS
BODY MASS INDEX: 21.07 KG/M2 | RESPIRATION RATE: 16 BRPM | DIASTOLIC BLOOD PRESSURE: 80 MMHG | HEART RATE: 87 BPM | WEIGHT: 126.6 LBS | OXYGEN SATURATION: 97 % | TEMPERATURE: 98.1 F | SYSTOLIC BLOOD PRESSURE: 120 MMHG

## 2022-01-10 NOTE — PROGRESS NOTES
[facility] GERIATRIC SERVICES  Chief Complaint   Patient presents with     [facility] Medical Record Number:  [id number]  Place of Service where encounter took place:  [facility] () [[id number]]    HPI:    Shari Graham  is 71 year old (1950), who is being seen today for a federally mandated E/M visit. Today's concerns are:    Background:    This is a 71-year-old female, with a past medical history significant for Alzheimer's dementia, hypertension and hyperlipidemia, who was last hospitalized at [facility] 8/22/20 through 8/24/20 for increased agitation and confusion. Felt to be secondary to progressing dementia and transferred to [facility] long term [facility].     Today's concerns are:    Weight Loss. Spoke to daughter, Gracie, via telephone. She reports that she feels her mother looks like she's lost weight. Asked staff her most recent weight and noticed this was lower than last month. Upon review of documentation, 144 lbs on 6/10/21 -> 133.6 lbs on 11/4/21 -> 128.2 lbs on 1/13/22. Has difficulty focusing at meal time and will need to be redirected. Is able to feed herself, but sometimes does better with staff feeding assistance.    Recurrent Falls. On 1/12/22, tripped while walking in the hallway and fell. Also fell on 1/11/22 from tripping while walking. ON 1/9/22, fell in the common area after tripping on another resident's foot. On 12/20/21, had fall after trying to sidestep a wheelchair. Has had no injuries or pain. Vital signs have been stable.     Alzheimer's Dementia, Anxiety and Delusions. Per daughter's report, patient is talking less. Used to have word salad, but now is not even doing that as frequently. Wants to honor her mother's wishes and given her weight loss, recurrent falls and advancing dementia, would like a hospice consult to be placed.     ALLERGIES:Patient has no known allergies.  PAST MEDICAL HISTORY:   Past Medical History:    Diagnosis Date     Dementia (H)      HTN, goal below 140/90 8/13/2012     Hyperlipidemia LDL goal <130 8/13/2012     Hypertension      Late onset Alzheimer's disease with behavioral disturbance (H) 8/14/2020     Mixed hyperlipidemia     Hyperlipidemia     PAST SURGICAL HISTORY:   has a past surgical history that includes surgical history of -  (grade school); hysterectomy, pap no longer indicated (1995); surgical history of -  (2011); colonoscopy; and appendectomy.  FAMILY HISTORY: family history includes Alzheimer Disease in her paternal grandmother; Breast Cancer in her sister and sister; Cancer in her father; Diabetes in her maternal grandmother and mother; Heart Disease in her mother; Hypertension in her mother; Prostate Cancer in her brother, brother, father, and paternal grandfather; Respiratory in her father.  SOCIAL HISTORY:  reports that she quit smoking about 40 years ago. Her smoking use included cigarettes. She has a 5.50 pack-year smoking history. She has never used smokeless tobacco. She reports current alcohol use. She reports that she does not use drugs.    MEDICATIONS:     Review of your medicines          Accurate as of January 13, 2022 10:55 PM. If you have any questions, ask your nurse or doctor.            CONTINUE these medicines which have NOT CHANGED      Dose / Directions   acetaminophen 325 MG tablet  Commonly known as: TYLENOL  Used for: Pain      Take 2 tablets (650 mg) by mouth 2 times daily.  Refills: 0     cyanocobalamin 100 MCG tablet  Commonly known as: VITAMIN B-12      Dose: 100 mcg  Take 100 mcg by mouth daily  Refills: 0     divalproex sodium delayed-release 125 MG DR tablet  Commonly known as: DEPAKOTE  Used for: Late onset Alzheimer's disease with behavioral disturbance (H)      Dose: 250 mg  Take 2 tablets (250 mg) by mouth 3 times daily  Refills: 0     escitalopram 20 MG tablet  Commonly known as: LEXAPRO      Dose: 20 mg  Take 20 mg by mouth At Bedtime  Refills: 0      mirtazapine 15 MG tablet  Commonly known as: REMERON  Used for: Late onset Alzheimer's disease with behavioral disturbance (H), Anxiety      Dose: 15 mg  Take 1 tablet (15 mg) by mouth At Bedtime  Quantity:    Refills: 0     OLANZapine 2.5 MG tablet  Commonly known as: zyPREXA      Dose: 2.5 mg  Take 1 tablet (2.5 mg) by mouth 2 times daily At 12 PM and 4 PM  Refills: 0     SENNA-docusate sodium 8.6-50 MG tablet  Commonly known as: SENNA S  Used for: Constipation, unspecified constipation type      Dose: 1 tablet  Take 1 tablet by mouth 2 times daily as needed (constipation)  Refills: 0     simvastatin 10 MG tablet  Commonly known as: ZOCOR  Used for: Hyperlipidemia LDL goal <130      TAKE ONE TABLET BY MOUTH AT BEDTIME  Quantity: 90 tablet  Refills: 3     Vitamin D (Cholecalciferol) 25 MCG (1000 UT) Caps      Dose: 1,000 Units  Take 1,000 Units by mouth daily  Refills: 0           Case Management:  I have reviewed the care plan and MDS and do agree with the plan. Patient's desire to return to the community is not assessible due to cognitive impairment. Information reviewed:  Medications, vital signs, orders, and nursing notes.    ROS:  Unobtainable secondary to cognitive impairment.     Vitals:  /80   Pulse 87   Temp 98.1  F (36.7  C)   Resp 16   Wt 57.4 kg (126 lb 9.6 oz)   SpO2 97%   BMI 21.07 kg/m    Body mass index is 21.07 kg/m .  Exam:  GENERAL APPEARANCE:  Alert, in no distress  ENT:  Mouth and posterior oropharynx normal, moist mucous membranes  EYES:  EOM, conjunctivae, lids, pupils and irises normal  RESP:  respiratory effort and palpation of chest normal, lungs clear to auscultation , no respiratory distress, in anterior lobes  CV:  Palpation and auscultation of heart done , regular rate and rhythm, no murmur, rub, or gallop  ABDOMEN:  normal bowel sounds, soft, nontender, no hepatosplenomegaly or other masses  SKIN:  Inspection of skin and subcutaneous tissue baseline, Palpation of skin  and subcutaneous tissue baseline  NEURO:   Cranial nerves 2-12 are normal tested and grossly at patient's baseline  PSYCH:  memory impaired     Lab/Diagnostic data:   Labs done in SNF are in [facility]. Please refer to them using [facility] Everywhere.    ASSESSMENT/PLAN  Weight Loss. Given 15.8 lbs weight loss since June, advancing dementia and recurrent falls, daughter is interested in consulting [facility] as goal of care is comfort. Will update nurse manager and place order.     Alzheimer's Dementia, Anxiety and Delusions. Chronic, progressive. Nonsensical verbalizations. Resides on secure memory care unit. Quetiapine discontinued in August 2020 with initiation of PRN in January 2021 x 14 days. Failed GDR of Divalproex in January 2021. Tapered off Olanzapine in December 2020, but re-initiated on 3/4/21 due to increased behaviors with addition of afternoon dose on 11/24/21. Also takes Escitalopram and Mirtazapine. Previously reviewed recommendation for EKG to check QTc given higher dose of Escitalopram, but daughter does not want this performed as overall goal is comfort. Will consult hospice as noted above.    Recurrent Falls. May be related to medications noted above, although no report of sedation, and advancing dementia. No injury. Goal of care is comfort. Will consult hospice as noted above.     Hypertension and Hyperlipidemia. Metoprolol discontinued on 7/9/21 due to intermittent bradycardia and soft blood pressures. Takes Simvastatin, but could consider discontinuation given goals of care. Will defer to hospice for reduction of polypharmacy if patient is admitted.     Pain. Will not adjust Acetaminophen as goal of care is comfort.     Elevated TSH. Last TSH 6.59 on 3/12/21 which would be at goal given patient's age and co-morbidities. Will not repeat labs as goal is comfort.    Constipation. Continue Senna-S PRN as ordered.      Orders:  Ok for [facility]    Electronically signed  by:  AARON Funez CNP

## 2022-01-13 ENCOUNTER — NURSING HOME VISIT (OUTPATIENT)
Dept: GERIATRICS | Facility: CLINIC | Age: 72
End: 2022-01-13
Payer: COMMERCIAL

## 2022-01-13 DIAGNOSIS — R29.6 RECURRENT FALLS: ICD-10-CM

## 2022-01-13 DIAGNOSIS — R63.4 WEIGHT LOSS: ICD-10-CM

## 2022-01-13 DIAGNOSIS — F22 DELUSIONAL DISORDERS (H): Primary | ICD-10-CM

## 2022-01-13 DIAGNOSIS — F02.818 LATE ONSET ALZHEIMER'S DISEASE WITH BEHAVIORAL DISTURBANCE (H): ICD-10-CM

## 2022-01-13 DIAGNOSIS — G30.1 LATE ONSET ALZHEIMER'S DISEASE WITH BEHAVIORAL DISTURBANCE (H): ICD-10-CM

## 2022-01-13 PROCEDURE — 99309 SBSQ NF CARE MODERATE MDM 30: CPT | Performed by: NURSE PRACTITIONER

## 2022-01-13 NOTE — LETTER
1/13/2022        RE: Shari Graham  [address] [address]        [facility] GERIATRIC SERVICES  Chief Complaint   Patient presents with     [facility] Medical Record Number:  [id number]  Place of Service where encounter took place:  [facility] () [[id number]]    HPI:    Shari Graham  is 71 year old (1950), who is being seen today for a federally mandated E/M visit. Today's concerns are:    Background:    This is a 71-year-old female, with a past medical history significant for Alzheimer's dementia, hypertension and hyperlipidemia, who was last hospitalized at [facility] 8/22/20 through 8/24/20 for increased agitation and confusion. Felt to be secondary to progressing dementia and transferred to [facility] long term [facility].     Today's concerns are:    Weight Loss. Spoke to daughter, Gracie, via telephone. She reports that she feels her mother looks like she's lost weight. Asked staff her most recent weight and noticed this was lower than last month. Upon review of documentation, 144 lbs on 6/10/21 -> 133.6 lbs on 11/4/21 -> 128.2 lbs on 1/13/22. Has difficulty focusing at meal time and will need to be redirected. Is able to feed herself, but sometimes does better with staff feeding assistance.    Recurrent Falls. On 1/12/22, tripped while walking in the hallway and fell. Also fell on 1/11/22 from tripping while walking. ON 1/9/22, fell in the common area after tripping on another resident's foot. On 12/20/21, had fall after trying to sidestep a wheelchair. Has had no injuries or pain. Vital signs have been stable.     Alzheimer's Dementia, Anxiety and Delusions. Per daughter's report, patient is talking less. Used to have word salad, but now is not even doing that as frequently. Wants to honor her mother's wishes and given her weight loss, recurrent falls and advancing dementia, would like a hospice consult to be placed.      ALLERGIES:Patient has no known allergies.  PAST MEDICAL HISTORY:   Past Medical History:   Diagnosis Date     Dementia (H)      HTN, goal below 140/90 8/13/2012     Hyperlipidemia LDL goal <130 8/13/2012     Hypertension      Late onset Alzheimer's disease with behavioral disturbance (H) 8/14/2020     Mixed hyperlipidemia     Hyperlipidemia     PAST SURGICAL HISTORY:   has a past surgical history that includes surgical history of -  (grade school); hysterectomy, pap no longer indicated (1995); surgical history of -  (2011); colonoscopy; and appendectomy.  FAMILY HISTORY: family history includes Alzheimer Disease in her paternal grandmother; Breast Cancer in her sister and sister; Cancer in her father; Diabetes in her maternal grandmother and mother; Heart Disease in her mother; Hypertension in her mother; Prostate Cancer in her brother, brother, father, and paternal grandfather; Respiratory in her father.  SOCIAL HISTORY:  reports that she quit smoking about 40 years ago. Her smoking use included cigarettes. She has a 5.50 pack-year smoking history. She has never used smokeless tobacco. She reports current alcohol use. She reports that she does not use drugs.    MEDICATIONS:     Review of your medicines          Accurate as of January 13, 2022 10:55 PM. If you have any questions, ask your nurse or doctor.            CONTINUE these medicines which have NOT CHANGED      Dose / Directions   acetaminophen 325 MG tablet  Commonly known as: TYLENOL  Used for: Pain      Take 2 tablets (650 mg) by mouth 2 times daily.  Refills: 0     cyanocobalamin 100 MCG tablet  Commonly known as: VITAMIN B-12      Dose: 100 mcg  Take 100 mcg by mouth daily  Refills: 0     divalproex sodium delayed-release 125 MG DR tablet  Commonly known as: DEPAKOTE  Used for: Late onset Alzheimer's disease with behavioral disturbance (H)      Dose: 250 mg  Take 2 tablets (250 mg) by mouth 3 times daily  Refills: 0     escitalopram 20 MG  tablet  Commonly known as: LEXAPRO      Dose: 20 mg  Take 20 mg by mouth At Bedtime  Refills: 0     mirtazapine 15 MG tablet  Commonly known as: REMERON  Used for: Late onset Alzheimer's disease with behavioral disturbance (H), Anxiety      Dose: 15 mg  Take 1 tablet (15 mg) by mouth At Bedtime  Quantity:    Refills: 0     OLANZapine 2.5 MG tablet  Commonly known as: zyPREXA      Dose: 2.5 mg  Take 1 tablet (2.5 mg) by mouth 2 times daily At 12 PM and 4 PM  Refills: 0     SENNA-docusate sodium 8.6-50 MG tablet  Commonly known as: SENNA S  Used for: Constipation, unspecified constipation type      Dose: 1 tablet  Take 1 tablet by mouth 2 times daily as needed (constipation)  Refills: 0     simvastatin 10 MG tablet  Commonly known as: ZOCOR  Used for: Hyperlipidemia LDL goal <130      TAKE ONE TABLET BY MOUTH AT BEDTIME  Quantity: 90 tablet  Refills: 3     Vitamin D (Cholecalciferol) 25 MCG (1000 UT) Caps      Dose: 1,000 Units  Take 1,000 Units by mouth daily  Refills: 0           Case Management:  I have reviewed the care plan and MDS and do agree with the plan. Patient's desire to return to the community is not assessible due to cognitive impairment. Information reviewed:  Medications, vital signs, orders, and nursing notes.    ROS:  Unobtainable secondary to cognitive impairment.     Vitals:  /80   Pulse 87   Temp 98.1  F (36.7  C)   Resp 16   Wt 57.4 kg (126 lb 9.6 oz)   SpO2 97%   BMI 21.07 kg/m    Body mass index is 21.07 kg/m .  Exam:  GENERAL APPEARANCE:  Alert, in no distress  ENT:  Mouth and posterior oropharynx normal, moist mucous membranes  EYES:  EOM, conjunctivae, lids, pupils and irises normal  RESP:  respiratory effort and palpation of chest normal, lungs clear to auscultation , no respiratory distress, in anterior lobes  CV:  Palpation and auscultation of heart done , regular rate and rhythm, no murmur, rub, or gallop  ABDOMEN:  normal bowel sounds, soft, nontender, no hepatosplenomegaly  or other masses  SKIN:  Inspection of skin and subcutaneous tissue baseline, Palpation of skin and subcutaneous tissue baseline  NEURO:   Cranial nerves 2-12 are normal tested and grossly at patient's baseline  PSYCH:  memory impaired     Lab/Diagnostic data:   Labs done in SNF are in [facility]. Please refer to them using [facility] Everywhere.    ASSESSMENT/PLAN  Weight Loss. Given 15.8 lbs weight loss since June, advancing dementia and recurrent falls, daughter is interested in consulting [facility] as goal of care is comfort. Will update nurse manager and place order.     Alzheimer's Dementia, Anxiety and Delusions. Chronic, progressive. Nonsensical verbalizations. Resides on secure memory care unit. Quetiapine discontinued in August 2020 with initiation of PRN in January 2021 x 14 days. Failed GDR of Divalproex in January 2021. Tapered off Olanzapine in December 2020, but re-initiated on 3/4/21 due to increased behaviors with addition of afternoon dose on 11/24/21. Also takes Escitalopram and Mirtazapine. Previously reviewed recommendation for EKG to check QTc given higher dose of Escitalopram, but daughter does not want this performed as overall goal is comfort. Will consult hospice as noted above.    Recurrent Falls. May be related to medications noted above, although no report of sedation, and advancing dementia. No injury. Goal of care is comfort. Will consult hospice as noted above.     Hypertension and Hyperlipidemia. Metoprolol discontinued on 7/9/21 due to intermittent bradycardia and soft blood pressures. Takes Simvastatin, but could consider discontinuation given goals of care. Will defer to hospice for reduction of polypharmacy if patient is admitted.     Pain. Will not adjust Acetaminophen as goal of care is comfort.     Elevated TSH. Last TSH 6.59 on 3/12/21 which would be at goal given patient's age and co-morbidities. Will not repeat labs as goal is comfort.    Constipation.  Continue Senna-S PRN as ordered.      Orders:  Ok for [facility]    Electronically signed by:  AARON Funez CNP              Sincerely,        AARON Funez CNP

## 2022-01-17 ENCOUNTER — TELEPHONE (OUTPATIENT)
Dept: GERIATRICS | Facility: CLINIC | Age: 72
End: 2022-01-17
Payer: COMMERCIAL

## 2022-01-17 NOTE — TELEPHONE ENCOUNTER
Triage Home Care/Hospice Order Request    Provider: AARON Canales CNP  Facility: [facility] Type:  LTC    Agency: [facility] Type: Hospice  Caller: Real DE LA CRUZ  Call Back Number: [phone]      Order Request: Enroll in hospice services as of 1/24/22. Approve certificate of terminal illness for Alzheimer's Disease.    Verbal orders approved and given to Shannon Hensley RN    Provider giving order: AARON Canales CNP, RN

## 2022-02-12 ENCOUNTER — HEALTH MAINTENANCE LETTER (OUTPATIENT)
Age: 72
End: 2022-02-12

## 2022-03-29 ENCOUNTER — NURSING HOME VISIT (OUTPATIENT)
Dept: GERIATRICS | Facility: CLINIC | Age: 72
End: 2022-03-29
Payer: COMMERCIAL

## 2022-03-29 VITALS
HEIGHT: 65 IN | SYSTOLIC BLOOD PRESSURE: 111 MMHG | DIASTOLIC BLOOD PRESSURE: 68 MMHG | TEMPERATURE: 97.6 F | HEART RATE: 80 BPM | WEIGHT: 125.2 LBS | RESPIRATION RATE: 18 BRPM | BODY MASS INDEX: 20.86 KG/M2 | OXYGEN SATURATION: 95 %

## 2022-03-29 DIAGNOSIS — F02.818 LATE ONSET ALZHEIMER'S DISEASE WITH BEHAVIORAL DISTURBANCE (H): ICD-10-CM

## 2022-03-29 DIAGNOSIS — Z51.5 HOSPICE CARE PATIENT: Primary | ICD-10-CM

## 2022-03-29 DIAGNOSIS — G30.1 LATE ONSET ALZHEIMER'S DISEASE WITH BEHAVIORAL DISTURBANCE (H): ICD-10-CM

## 2022-03-29 DIAGNOSIS — R29.6 RECURRENT FALLS: ICD-10-CM

## 2022-03-29 DIAGNOSIS — E78.5 HYPERLIPIDEMIA LDL GOAL <130: ICD-10-CM

## 2022-03-29 DIAGNOSIS — F41.9 ANXIETY: ICD-10-CM

## 2022-03-29 DIAGNOSIS — R13.10 DYSPHAGIA, UNSPECIFIED TYPE: ICD-10-CM

## 2022-03-29 DIAGNOSIS — R63.4 WEIGHT LOSS: ICD-10-CM

## 2022-03-29 PROCEDURE — 99309 SBSQ NF CARE MODERATE MDM 30: CPT | Performed by: INTERNAL MEDICINE

## 2022-03-29 NOTE — LETTER
"    3/29/2022        RE: Shari Graham  [address] [address]        [place] GERIATRIC SERVICES  PHYSICIAN NOTE    Chief Complaint   Patient presents with     [facility]       HPI:    Shari Graham is a 71 year old  (1950), who is being seen today for a federally mandated E/M visit at [facility]. Admitted to LT in Aug 2020 with progressive dementia. As of January 2022 she signed on to [facility] care with terminal illness diagnosis of \"Alzheimer's disease\". She'd had weight loss along with recurrent falls and overall less talkative. Falls could be at various times of day and were even noted as outpatient before admitting to [facility] memory care unit d/t progressive nature of her disease.Now weight down about another 5# over the past few months; on pureed diet. Hospice increased her Depakote and decreased her Zyprexa. Also, last month, Soraya was offered a Broda chair which has seemingly been the largest impact on her life of late in terms of helping promote comfort. Per staff, she seems to really like the comfort of the chair, sits back/reclines in it and is calm throughout the day and no longer has tenancy to pace/fall. RN says, \"she's more peaceful overall\". She had a shower this AM well tolerated and now is in common area where I find her. Soraya makes eye contact, tries to mouth some words but is essentially non-verbal during my visit. Allows exam.    ALLERGIES: Patient has no known allergies.    Past Medical History:   Diagnosis Date     Dementia (H)      HTN, goal below 140/90 8/13/2012     Hyperlipidemia LDL goal <130 8/13/2012     Hypertension      Late onset Alzheimer's disease with behavioral disturbance (H) 8/14/2020     Mixed hyperlipidemia     Hyperlipidemia      CODE STATUS: DNR/I Comfort/ Hospice    MEDICATIONS: Reviewed and updated in Epic according to facility MAR  Current Outpatient Medications   Medication Sig Dispense Refill     " "acetaminophen (TYLENOL) 325 MG tablet Take 650 mg by mouth 3 times daily       cyanocobalamin (VITAMIN B-12) 100 MCG tablet Take 100 mcg by mouth daily       divalproex sodium delayed-release (DEPAKOTE) 250 MG DR tablet Take 2 tablets (500 mg) by mouth 3 times daily       escitalopram (LEXAPRO) 20 MG tablet Take 20 mg by mouth At Bedtime       mirtazapine (REMERON) 15 MG tablet Take 1 tablet (15 mg) by mouth At Bedtime       morphine sulfate (ROXANOL) 20 mg/mL (HIGH CONC) soln Take 2.5 mg by mouth every 2 hours as needed for shortness of breath / dyspnea or breakthrough pain       OLANZapine (ZYPREXA) 2.5 MG tablet Take 2.5 mg by mouth daily @ 4 PM       SENNA-docusate sodium (SENNA S) 8.6-50 MG tablet Take 1 tablet by mouth 2 times daily as needed (constipation)       simvastatin (ZOCOR) 10 MG tablet TAKE ONE TABLET BY MOUTH AT BEDTIME 90 tablet 3     Vitamin D, Cholecalciferol, 25 MCG (1000 UT) CAPS Take 1,000 Units by mouth daily         ROS:  Unobtainable secondary to cognitive impairment.     Exam:  /68   Pulse 80   Temp 97.6  F (36.4  C)   Resp 18   Ht 1.651 m (5' 5\")   Wt 56.8 kg (125 lb 3.2 oz)   SpO2 95%   BMI 20.83 kg/m    Alert, reclined comfortably nicely groomed in Broda chair  No scleral icterus  Mildly dry oral mucosa  HRRR without mrg  No edema  Makes eye contact, seems calm/content but not sedated  Tries to mumble words calmly but essentially non-verbal at time of my visit    Lab/Diagnostic Data:    Not indicated on hospice     ASSESSMENT/PLAN:  (Z51.5) Hospice care patient  (primary encounter diagnosis)  (G30.1,  F02.81) Late onset Alzheimer's disease with behavioral disturbance (H)  (F41.9) Anxiety  (R29.6) Recurrent falls  (R63.4) Weight loss  (R13.10) Dysphagia, unspecified type  So appreciate family, staff and now [facility] team  Signed onto hospice cares Jan 2022; see prior discussions with daughter Gracie that comfort is number one priority for Soraya's family  She has " subtle on-going expected weight loss with dysphagia on pureed diet and staff assisted feedings as tolerated for comfort  She is so much more comfortable now and no longer anxiously pacing - hard to sort out if the major difference was an increase in Depakote or the Broda chair but seemingly its the Broda chair that gives her the sense of comfort/security to sit back  Not overly sedated at all, allowing staff assistance with bathing  I do note that (ADDENDUM) a few weeks after my visit hospice was able to do GDR of Depakote from 500 mg TID down to 250 mg TID again with Soraya still being quite comfortable  She has PRN morphine available from hospice but hasn't need to use it in over a month    (E78.5) Hyperlipidemia LDL goal <130  No indication at this time for on-going treatment with Simvastatin 10 mg daily so it was discontinued today        Electronically signed by:  Lurdes Sinclair, DO        Sincerely,        Lurdes Sinclair, DO

## 2022-05-18 PROBLEM — R63.4 WEIGHT LOSS: Status: ACTIVE | Noted: 2022-05-18

## 2022-05-18 PROBLEM — Z51.5 HOSPICE CARE PATIENT: Status: ACTIVE | Noted: 2022-05-18

## 2022-05-18 PROBLEM — R13.10 DYSPHAGIA, UNSPECIFIED TYPE: Status: ACTIVE | Noted: 2022-05-18

## 2022-05-18 PROBLEM — R29.6 RECURRENT FALLS: Status: ACTIVE | Noted: 2022-05-18

## 2022-05-18 RX ORDER — MORPHINE SULFATE 20 MG/ML
2.5 SOLUTION ORAL
COMMUNITY
End: 2023-02-13

## 2022-05-18 RX ORDER — ACETAMINOPHEN 325 MG/1
650 TABLET ORAL 3 TIMES DAILY
COMMUNITY
End: 2022-09-13

## 2022-05-18 NOTE — PROGRESS NOTES
"[facility]  PHYSICIAN NOTE    Chief Complaint   Patient presents with     jail Regulatory       HPI:    Shari Graham is a 71 year old  (1950), who is being seen today for a federally mandated E/M visit at [facility]. Admitted to LT in Aug 2020 with progressive dementia. As of January 2022 she signed on to [facility] hospice care with terminal illness diagnosis of \"Alzheimer's disease\". She'd had weight loss along with recurrent falls and overall less talkative. Falls could be at various times of day and were even noted as outpatient before admitting to [facility] memory care unit d/t progressive nature of her disease.Now weight down about another 5# over the past few months; on pureed diet. Hospice increased her Depakote and decreased her Zyprexa. Also, last month, Soraya was offered a Broda chair which has seemingly been the largest impact on her life of late in terms of helping promote comfort. Per staff, she seems to really like the comfort of the chair, sits back/reclines in it and is calm throughout the day and no longer has tenancy to pace/fall. RN says, \"she's more peaceful overall\". She had a shower this AM well tolerated and now is in common area where I find her. Soraya makes eye contact, tries to mouth some words but is essentially non-verbal during my visit. Allows exam.    ALLERGIES: Patient has no known allergies.    Past Medical History:   Diagnosis Date     Dementia (H)      HTN, goal below 140/90 8/13/2012     Hyperlipidemia LDL goal <130 8/13/2012     Hypertension      Late onset Alzheimer's disease with behavioral disturbance (H) 8/14/2020     Mixed hyperlipidemia     Hyperlipidemia      CODE STATUS: DNR/I Comfort/ Hospice    MEDICATIONS: Reviewed and updated in Epic according to facility MAR  Current Outpatient Medications   Medication Sig Dispense Refill     acetaminophen (TYLENOL) 325 MG tablet Take 650 mg by mouth 3 times daily       cyanocobalamin " "(VITAMIN B-12) 100 MCG tablet Take 100 mcg by mouth daily       divalproex sodium delayed-release (DEPAKOTE) 250 MG DR tablet Take 2 tablets (500 mg) by mouth 3 times daily       escitalopram (LEXAPRO) 20 MG tablet Take 20 mg by mouth At Bedtime       mirtazapine (REMERON) 15 MG tablet Take 1 tablet (15 mg) by mouth At Bedtime       morphine sulfate (ROXANOL) 20 mg/mL (HIGH CONC) soln Take 2.5 mg by mouth every 2 hours as needed for shortness of breath / dyspnea or breakthrough pain       OLANZapine (ZYPREXA) 2.5 MG tablet Take 2.5 mg by mouth daily @ 4 PM       SENNA-docusate sodium (SENNA S) 8.6-50 MG tablet Take 1 tablet by mouth 2 times daily as needed (constipation)       simvastatin (ZOCOR) 10 MG tablet TAKE ONE TABLET BY MOUTH AT BEDTIME 90 tablet 3     Vitamin D, Cholecalciferol, 25 MCG (1000 UT) CAPS Take 1,000 Units by mouth daily         ROS:  Unobtainable secondary to cognitive impairment.     Exam:  /68   Pulse 80   Temp 97.6  F (36.4  C)   Resp 18   Ht 1.651 m (5' 5\")   Wt 56.8 kg (125 lb 3.2 oz)   SpO2 95%   BMI 20.83 kg/m    Alert, reclined comfortably nicely groomed in Broda chair  No scleral icterus  Mildly dry oral mucosa  HRRR without mrg  No edema  Makes eye contact, seems calm/content but not sedated  Tries to mumble words calmly but essentially non-verbal at time of my visit    Lab/Diagnostic Data:    Not indicated on hospice     ASSESSMENT/PLAN:  (Z51.5) Hospice care patient  (primary encounter diagnosis)  (G30.1,  F02.81) Late onset Alzheimer's disease with behavioral disturbance (H)  (F41.9) Anxiety  (R29.6) Recurrent falls  (R63.4) Weight loss  (R13.10) Dysphagia, unspecified type  So appreciate family, staff and now [facility]ospice team  Signed onto hospice cares Jan 2022; see prior discussions with daughter Gracie that comfort is number one priority for Soraya's family  She has subtle on-going expected weight loss with dysphagia on pureed diet and staff assisted " feedings as tolerated for comfort  She is so much more comfortable now and no longer anxiously pacing - hard to sort out if the major difference was an increase in Depakote or the Broda chair but seemingly its the Broda chair that gives her the sense of comfort/security to sit back  Not overly sedated at all, allowing staff assistance with bathing  I do note that (ADDENDUM) a few weeks after my visit hospice was able to do GDR of Depakote from 500 mg TID down to 250 mg TID again with Soraya still being quite comfortable  She has PRN morphine available from hospice but hasn't need to use it in over a month    (E78.5) Hyperlipidemia LDL goal <130  No indication at this time for on-going treatment with Simvastatin 10 mg daily so it was discontinued today        Electronically signed by:  Lurdes Sinclair,

## 2022-05-23 ENCOUNTER — NURSING HOME VISIT (OUTPATIENT)
Dept: GERIATRICS | Facility: CLINIC | Age: 72
End: 2022-05-23
Payer: MEDICARE

## 2022-05-23 VITALS
RESPIRATION RATE: 18 BRPM | DIASTOLIC BLOOD PRESSURE: 73 MMHG | BODY MASS INDEX: 20.37 KG/M2 | WEIGHT: 122.4 LBS | TEMPERATURE: 97.6 F | OXYGEN SATURATION: 97 % | HEART RATE: 75 BPM | SYSTOLIC BLOOD PRESSURE: 123 MMHG

## 2022-05-23 DIAGNOSIS — R29.6 RECURRENT FALLS: ICD-10-CM

## 2022-05-23 DIAGNOSIS — Z51.5 HOSPICE CARE PATIENT: Primary | ICD-10-CM

## 2022-05-23 DIAGNOSIS — G30.1 LATE ONSET ALZHEIMER'S DISEASE WITH BEHAVIORAL DISTURBANCE (H): ICD-10-CM

## 2022-05-23 DIAGNOSIS — F02.818 LATE ONSET ALZHEIMER'S DISEASE WITH BEHAVIORAL DISTURBANCE (H): ICD-10-CM

## 2022-05-23 PROCEDURE — 99309 SBSQ NF CARE MODERATE MDM 30: CPT | Mod: GV | Performed by: NURSE PRACTITIONER

## 2022-05-23 NOTE — LETTER
5/23/2022        RE: Shari Graham  [address] [address]        [facility]  Chief Complaint   Patient presents with     detention Regulatory     [facility] Medical Record Number:  [id number]  Place of Service where encounter took place:  [facility] () [[id number]]    HPI:    Shari Graham  is 71 year old (1950), who is being seen today for a federally mandated E/M visit.    Background:    This is a 71-year-old female, with a past medical history significant for Alzheimer's dementia, hypertension and hyperlipidemia, who was last hospitalized at [facility] 8/22/20 through 8/24/20 for increased agitation and confusion. Felt to be secondary to progressing dementia and transferred to [facility] long term care.    Today's concerns are:    Hospice Care Patient. Enrolled in [facility] on 1/24/22. Today, is receiving a visit from Music Therapist. Upon review of weights, 128.4 lbs on 12/10/21 -> 122.4 lbs on 5/5/22.     Alzheimer's Dementia, Anxiety and Delusions. Unable to complete BIMS due to advanced dementia. Unable to make needs known.     Recurrent Falls. Is now in a Broda Chair for mobility. This has caused a decrease in falls. Last fall 2/25/22.    ALLERGIES:Patient has no known allergies.  PAST MEDICAL HISTORY:   Past Medical History:   Diagnosis Date     Dementia (H)      HTN, goal below 140/90 8/13/2012     Hyperlipidemia LDL goal <130 8/13/2012     Hypertension      Late onset Alzheimer's disease with behavioral disturbance (H) 8/14/2020     Mixed hyperlipidemia     Hyperlipidemia     PAST SURGICAL HISTORY:   has a past surgical history that includes surgical history of -  (grade school); hysterectomy, pap no longer indicated (1995); surgical history of -  (2011); colonoscopy; and appendectomy.  FAMILY HISTORY: family history includes Alzheimer Disease in her paternal grandmother; Breast Cancer in her sister and sister;  Cancer in her father; Diabetes in her maternal grandmother and mother; Heart Disease in her mother; Hypertension in her mother; Prostate Cancer in her brother, brother, father, and paternal grandfather; Respiratory in her father.  SOCIAL HISTORY:  reports that she quit smoking about 40 years ago. Her smoking use included cigarettes. She has a 5.50 pack-year smoking history. She has never used smokeless tobacco. She reports current alcohol use. She reports that she does not use drugs.    MEDICATIONS:     Review of your medicines          Accurate as of May 23, 2022 11:59 PM. If you have any questions, ask your nurse or doctor.            CONTINUE these medicines which have NOT CHANGED      Dose / Directions   acetaminophen 325 MG tablet  Commonly known as: TYLENOL      Dose: 650 mg  Take 650 mg by mouth 3 times daily  Refills: 0     cyanocobalamin 100 MCG tablet  Commonly known as: VITAMIN B-12      Dose: 100 mcg  Take 100 mcg by mouth daily  Refills: 0     divalproex sodium delayed-release 125 MG DR tablet  Commonly known as: DEPAKOTE  Used for: Late onset Alzheimer's disease with behavioral disturbance (H)      Dose: 250 mg  Take 2 tablets (250 mg) by mouth 3 times daily  Refills: 0     escitalopram 20 MG tablet  Commonly known as: LEXAPRO      Dose: 20 mg  Take 20 mg by mouth At Bedtime  Refills: 0     mirtazapine 15 MG tablet  Commonly known as: REMERON  Used for: Late onset Alzheimer's disease with behavioral disturbance (H), Anxiety      Dose: 15 mg  Take 1 tablet (15 mg) by mouth At Bedtime  Quantity:    Refills: 0     morphine sulfate 20 mg/mL (HIGH CONC) soln  Commonly known as: ROXANOL      Dose: 2.5 mg  Take 2.5 mg by mouth every 2 hours as needed for shortness of breath / dyspnea or breakthrough pain  Refills: 0     OLANZapine 2.5 MG tablet  Commonly known as: zyPREXA      Dose: 2.5 mg  Take 2.5 mg by mouth daily @ 4 PM  Refills: 0     SENNA-docusate sodium 8.6-50 MG tablet  Commonly known as: SENNA  S  Used for: Constipation, unspecified constipation type      Dose: 1 tablet  Take 1 tablet by mouth 2 times daily as needed (constipation)  Refills: 0     Vitamin D (Cholecalciferol) 25 MCG (1000 UT) Caps      Dose: 1,000 Units  Take 1,000 Units by mouth daily  Refills: 0           Case Management:  I have reviewed the care plan and MDS and do agree with the plan. Patient's desire to return to the community is not assessible due to cognitive impairment. Information reviewed:  Medications, vital signs, orders, and nursing notes.    ROS:  Unobtainable secondary to cognitive impairment.     Vitals:  /73   Pulse 75   Temp 97.6  F (36.4  C)   Resp 18   Wt 55.5 kg (122 lb 6.4 oz)   SpO2 97%   BMI 20.37 kg/m    Body mass index is 20.37 kg/m .  Exam:  GENERAL APPEARANCE:  Alert, in no distress  ENT:  Mouth and posterior oropharynx normal, moist mucous membranes  EYES:  EOM, conjunctivae, lids, pupils and irises normal  PSYCH:  memory impaired     Lab/Diagnostic data:   Labs done in SNF are in [facility]. Please refer to them using [employer]/Care Everywhere.    ASSESSMENT/PLAN:  Hospice Care Patient. Enrolled in [facility] on 1/24/22 given advancing dementia, decline in function, and weight loss. Goal is comfort.    Alzheimer's Dementia, Anxiety and Delusions. Chronic, progressive. Nonsensical verbalizations. Resides on secure memory care unit. Quetiapine discontinued in August 2020 with initiation of PRN in January 2021 x 14 days. Failed GDR of Divalproex in January 2021, but re-attempted taper on 4/8/22 due to excessive sleepiness. Tapered off Olanzapine in December 2020, but re-initiated on 3/4/21 due to increased behaviors with addition of afternoon dose on 11/24/21. Also takes Escitalopram and Mirtazapine.      Recurrent Falls. Now resolved given decline in mobility and transition to Broda Chair.      Hypertension and Hyperlipidemia. Blood pressures < 140/90. Requires no medications.      Elevated  TSH. Last TSH 6.59 on 3/12/21 which would be at goal given patient's age and co-morbidities. Given goal of comfort and hospice enrollment, will not check labs.     Constipation. Continue Senna-S PRN as ordered. Last utilized in March 2022.    Orders:  None    Electronically signed by:  AARON Funez CNP              Sincerely,        AARON Funez CNP

## 2022-05-23 NOTE — PROGRESS NOTES
[facility]IATR[facility]  Chief Complaint   Patient presents with     retirement Regulatory     [facility] Medical Record Number:  [id number]  Place of Service where encounter took place:  [facility] () [[id number]]    HPI:    Shari Graham  is 71 year old (1950), who is being seen today for a federally mandated E/M visit.    Background:    This is a 71-year-old female, with a past medical history significant for Alzheimer's dementia, hypertension and hyperlipidemia, who was last hospitalized at [facility] 8/22/20 through 8/24/20 for increased agitation and confusion. Felt to be secondary to progressing dementia and transferred to [facility] long term care.    Today's concerns are:    Hospice Care Patient. Enrolled in [facility] on 1/24/22. Today, is receiving a visit from Music Therapist. Upon review of weights, 128.4 lbs on 12/10/21 -> 122.4 lbs on 5/5/22.     Alzheimer's Dementia, Anxiety and Delusions. Unable to complete BIMS due to advanced dementia. Unable to make needs known.     Recurrent Falls. Is now in a Broda Chair for mobility. This has caused a decrease in falls. Last fall 2/25/22.    ALLERGIES:Patient has no known allergies.  PAST MEDICAL HISTORY:   Past Medical History:   Diagnosis Date     Dementia (H)      HTN, goal below 140/90 8/13/2012     Hyperlipidemia LDL goal <130 8/13/2012     Hypertension      Late onset Alzheimer's disease with behavioral disturbance (H) 8/14/2020     Mixed hyperlipidemia     Hyperlipidemia     PAST SURGICAL HISTORY:   has a past surgical history that includes surgical history of -  (grade school); hysterectomy, pap no longer indicated (1995); surgical history of -  (2011); colonoscopy; and appendectomy.  FAMILY HISTORY: family history includes Alzheimer Disease in her paternal grandmother; Breast Cancer in her sister and sister; Cancer in her father; Diabetes in her maternal grandmother and mother; Heart Disease in her  mother; Hypertension in her mother; Prostate Cancer in her brother, brother, father, and paternal grandfather; Respiratory in her father.  SOCIAL HISTORY:  reports that she quit smoking about 40 years ago. Her smoking use included cigarettes. She has a 5.50 pack-year smoking history. She has never used smokeless tobacco. She reports current alcohol use. She reports that she does not use drugs.    MEDICATIONS:     Review of your medicines          Accurate as of May 23, 2022 11:59 PM. If you have any questions, ask your nurse or doctor.            CONTINUE these medicines which have NOT CHANGED      Dose / Directions   acetaminophen 325 MG tablet  Commonly known as: TYLENOL      Dose: 650 mg  Take 650 mg by mouth 3 times daily  Refills: 0     cyanocobalamin 100 MCG tablet  Commonly known as: VITAMIN B-12      Dose: 100 mcg  Take 100 mcg by mouth daily  Refills: 0     divalproex sodium delayed-release 125 MG DR tablet  Commonly known as: DEPAKOTE  Used for: Late onset Alzheimer's disease with behavioral disturbance (H)      Dose: 250 mg  Take 2 tablets (250 mg) by mouth 3 times daily  Refills: 0     escitalopram 20 MG tablet  Commonly known as: LEXAPRO      Dose: 20 mg  Take 20 mg by mouth At Bedtime  Refills: 0     mirtazapine 15 MG tablet  Commonly known as: REMERON  Used for: Late onset Alzheimer's disease with behavioral disturbance (H), Anxiety      Dose: 15 mg  Take 1 tablet (15 mg) by mouth At Bedtime  Quantity:    Refills: 0     morphine sulfate 20 mg/mL (HIGH CONC) soln  Commonly known as: ROXANOL      Dose: 2.5 mg  Take 2.5 mg by mouth every 2 hours as needed for shortness of breath / dyspnea or breakthrough pain  Refills: 0     OLANZapine 2.5 MG tablet  Commonly known as: zyPREXA      Dose: 2.5 mg  Take 2.5 mg by mouth daily @ 4 PM  Refills: 0     SENNA-docusate sodium 8.6-50 MG tablet  Commonly known as: SENNA S  Used for: Constipation, unspecified constipation type      Dose: 1 tablet  Take 1 tablet by  mouth 2 times daily as needed (constipation)  Refills: 0     Vitamin D (Cholecalciferol) 25 MCG (1000 UT) Caps      Dose: 1,000 Units  Take 1,000 Units by mouth daily  Refills: 0           Case Management:  I have reviewed the care plan and MDS and do agree with the plan. Patient's desire to return to the community is not assessible due to cognitive impairment. Information reviewed:  Medications, vital signs, orders, and nursing notes.    ROS:  Unobtainable secondary to cognitive impairment.     Vitals:  /73   Pulse 75   Temp 97.6  F (36.4  C)   Resp 18   Wt 55.5 kg (122 lb 6.4 oz)   SpO2 97%   BMI 20.37 kg/m    Body mass index is 20.37 kg/m .  Exam:  GENERAL APPEARANCE:  Alert, in no distress  ENT:  Mouth and posterior oropharynx normal, moist mucous membranes  EYES:  EOM, conjunctivae, lids, pupils and irises normal  PSYCH:  memory impaired     Lab/Diagnostic data:   Labs done in SNF are in [employer]. Please refer to them using [employer]/Care Everywhere.    ASSESSMENT/PLAN:  Hospice Care Patient. Enrolled in [facility] on 1/24/22 given advancing dementia, decline in function, and weight loss. Goal is comfort.    Alzheimer's Dementia, Anxiety and Delusions. Chronic, progressive. Nonsensical verbalizations. Resides on secure memory care unit. Quetiapine discontinued in August 2020 with initiation of PRN in January 2021 x 14 days. Failed GDR of Divalproex in January 2021, but re-attempted taper on 4/8/22 due to excessive sleepiness. Tapered off Olanzapine in December 2020, but re-initiated on 3/4/21 due to increased behaviors with addition of afternoon dose on 11/24/21. Also takes Escitalopram and Mirtazapine.      Recurrent Falls. Now resolved given decline in mobility and transition to Broda Chair.      Hypertension and Hyperlipidemia. Blood pressures < 140/90. Requires no medications.      Elevated TSH. Last TSH 6.59 on 3/12/21 which would be at goal given patient's age and co-morbidities.  Given goal of comfort and hospice enrollment, will not check labs.     Constipation. Continue Senna-S PRN as ordered. Last utilized in March 2022.    Orders:  None    Electronically signed by:  AARON Funez CNP

## 2022-07-13 ENCOUNTER — NURSING HOME VISIT (OUTPATIENT)
Dept: GERIATRICS | Facility: CLINIC | Age: 72
End: 2022-07-13
Payer: COMMERCIAL

## 2022-07-13 VITALS
HEART RATE: 75 BPM | SYSTOLIC BLOOD PRESSURE: 129 MMHG | TEMPERATURE: 97.8 F | OXYGEN SATURATION: 97 % | RESPIRATION RATE: 18 BRPM | WEIGHT: 126.8 LBS | HEIGHT: 65 IN | DIASTOLIC BLOOD PRESSURE: 71 MMHG | BODY MASS INDEX: 21.13 KG/M2

## 2022-07-13 DIAGNOSIS — F22 DELUSIONS (H): ICD-10-CM

## 2022-07-13 DIAGNOSIS — Z51.5 HOSPICE CARE PATIENT: ICD-10-CM

## 2022-07-13 DIAGNOSIS — G30.9 ALZHEIMER'S DEMENTIA WITHOUT BEHAVIORAL DISTURBANCE, UNSPECIFIED TIMING OF DEMENTIA ONSET: Primary | ICD-10-CM

## 2022-07-13 DIAGNOSIS — F02.80 ALZHEIMER'S DEMENTIA WITHOUT BEHAVIORAL DISTURBANCE, UNSPECIFIED TIMING OF DEMENTIA ONSET: Primary | ICD-10-CM

## 2022-07-13 PROCEDURE — 99308 SBSQ NF CARE LOW MDM 20: CPT | Mod: GW | Performed by: INTERNAL MEDICINE

## 2022-07-13 NOTE — PROGRESS NOTES
[facility] GERIATRICS  REGULATORY VISIT  July 13, 2022      [facility] Medical Record Number:  [id number]  Place of Service where encounter took place:  [facility] () [[id number]]    Chief Complaint   Patient presents with     half-way Regulatory       HPI:    Shari Graham is a 71 year old  (1950), who is being seen today for a federally mandated E/M visit at [facility] where she has resided since August 2020. She is enrolled with hospice. HPI information obtained from: facility chart records, facility staff and [facility] chart review.    Today, Ms. Graham is seen briefly sitting in a Broda chair. She is enrolled in hospice since January with advanced dementia. She appears comfortable. No concerns per discussion with nursing.     ALLERGIES:  No Known Allergies     Past Medical, Surgical, Family and Social History: Reviewed and updated in EPIC.    MEDICATIONS:  Current Outpatient Medications   Medication Sig Dispense Refill     acetaminophen (TYLENOL) 325 MG tablet Take 650 mg by mouth 3 times daily       cyanocobalamin (VITAMIN B-12) 100 MCG tablet Take 100 mcg by mouth daily       divalproex sodium delayed-release (DEPAKOTE) 125 MG DR tablet Take 2 tablets (250 mg) by mouth 3 times daily       escitalopram (LEXAPRO) 20 MG tablet Take 20 mg by mouth At Bedtime       mirtazapine (REMERON) 15 MG tablet Take 1 tablet (15 mg) by mouth At Bedtime       morphine sulfate (ROXANOL) 20 mg/mL (HIGH CONC) soln Take 2.5 mg by mouth every 2 hours as needed for shortness of breath / dyspnea or breakthrough pain       OLANZapine (ZYPREXA) 2.5 MG tablet Take 2.5 mg by mouth daily @ 4 PM       SENNA-docusate sodium (SENNA S) 8.6-50 MG tablet Take 1 tablet by mouth 2 times daily as needed (constipation)       Vitamin D, Cholecalciferol, 25 MCG (1000 UT) CAPS Take 1,000 Units by mouth daily       Medications reviewed:  Medications reconciled to facility chart and changes were made to  "reflect current medications as identified as above med list. Below are the changes that were made:   Medications stopped since last EPIC medication reconciliation:   There are no discontinued medications.    Medications started since last EPIC medication reconciliation:  No orders of the defined types were placed in this encounter.    REVIEW OF SYSTEMS:  Unable to be obtained due to cognitive impairment or aphasia.     PHYSICAL EXAM:  /71   Pulse 75   Temp 97.8  F (36.6  C)   Resp 18   Ht 1.651 m (5' 5\")   Wt 57.5 kg (126 lb 12.8 oz)   SpO2 97%   BMI 21.10 kg/m    Gen: sitting in Broda chair, alert and in no acute distress  Resp: breathing non labored, no tachypnea   Ext: no LE edema  Neuro: CX II-XII grossly in tact  Psych: memory, judgement and insight impaired    LABS[facility]IMAGING: [facility] Epic [facility]    ASSESSMENT / PLAN:    Alzheimer's Dementia With Delusions  Anxiety  Hospice Care Patient   BIMS unattainable. Enrolled with hospice.  -- depakote 250 mg TID, escitalopram 20 mg daily, mirtazapine 15 mg at bedtime, olanzapine 2.5 mg at 1600  -- ongoing 24/7 nursing and supportive cares  -- appreciate the cares of the hospice team      Electronically signed by  Cheryl Farias MD       "

## 2022-07-13 NOTE — LETTER
7/13/2022        RE: Shari Graham  [facility]  [address] [facility] [address]        [facility] GERIATRICS  REGULATORY VISIT  July 13, 2022      [facility] Medical Record Number:  [id number]  Place of Service where encounter took place:  [facility] () [[id number]]    Chief Complaint   Patient presents with     retirement Regulatory       HPI:    Shari Graham is a 71 year old  (1950), who is being seen today for a federally mandated E/M visit at [facility] where she has resided since August 2020. She is enrolled with hospice. HPI information obtained from: facility chart records, facility staff and [facility] chart review.    Today, Ms. Graham is seen briefly sitting in a Broda chair. She is enrolled in hospice since January with advanced dementia. She appears comfortable. No concerns per discussion with nursing.     ALLERGIES:  No Known Allergies     Past Medical, Surgical, Family and Social History: Reviewed and updated in EPIC.    MEDICATIONS:  Current Outpatient Medications   Medication Sig Dispense Refill     acetaminophen (TYLENOL) 325 MG tablet Take 650 mg by mouth 3 times daily       cyanocobalamin (VITAMIN B-12) 100 MCG tablet Take 100 mcg by mouth daily       divalproex sodium delayed-release (DEPAKOTE) 125 MG DR tablet Take 2 tablets (250 mg) by mouth 3 times daily       escitalopram (LEXAPRO) 20 MG tablet Take 20 mg by mouth At Bedtime       mirtazapine (REMERON) 15 MG tablet Take 1 tablet (15 mg) by mouth At Bedtime       morphine sulfate (ROXANOL) 20 mg/mL (HIGH CONC) soln Take 2.5 mg by mouth every 2 hours as needed for shortness of breath / dyspnea or breakthrough pain       OLANZapine (ZYPREXA) 2.5 MG tablet Take 2.5 mg by mouth daily @ 4 PM       SENNA-docusate sodium (SENNA S) 8.6-50 MG tablet Take 1 tablet by mouth 2 times daily as needed (constipation)       Vitamin D, Cholecalciferol, 25 MCG (1000 UT) CAPS Take 1,000 Units  "by mouth daily       Medications reviewed:  Medications reconciled to facility chart and changes were made to reflect current medications as identified as above med list. Below are the changes that were made:   Medications stopped since last EPIC medication reconciliation:   There are no discontinued medications.    Medications started since last [facility] medication reconciliation:  No orders of the defined types were placed in this encounter.    REVIEW OF SYSTEMS:  Unable to be obtained due to cognitive impairment or aphasia.     PHYSICAL EXAM:  /71   Pulse 75   Temp 97.8  F (36.6  C)   Resp 18   Ht 1.651 m (5' 5\")   Wt 57.5 kg (126 lb 12.8 oz)   SpO2 97%   BMI 21.10 kg/m    Gen: sitting in Broda chair, alert and in no acute distress  Resp: breathing non labored, no tachypnea   Ext: no LE edema  Neuro: CX II-XII grossly in tact  Psych: memory, judgement and insight impaired    LABS/IMAGING: Reviewed as per Epic and/or [facility]    ASSESSMENT / PLAN:    Alzheimer's Dementia With Delusions  Anxiety  Hospice Care Patient   BIMS unattainable. Enrolled with hospice.  -- depakote 250 mg TID, escitalopram 20 mg daily, mirtazapine 15 mg at bedtime, olanzapine 2.5 mg at 1600  -- ongoing 24/7 nursing and supportive cares  -- appreciate the cares of the hospice team      Electronically signed by  Cheryl Farias MD             Sincerely,        Cheryl Farias MD    "

## 2022-08-11 PROBLEM — G47.9 SLEEP DIFFICULTIES: Status: ACTIVE | Noted: 2022-08-11

## 2022-08-11 PROBLEM — G93.41 METABOLIC ENCEPHALOPATHY: Status: ACTIVE | Noted: 2022-08-11

## 2022-08-11 PROBLEM — R41.82 ALTERED MENTAL STATE: Status: ACTIVE | Noted: 2020-08-22

## 2022-08-11 PROBLEM — R46.89 COGNITIVE AND BEHAVIORAL CHANGES: Status: ACTIVE | Noted: 2022-08-11

## 2022-08-11 PROBLEM — R41.89 COGNITIVE AND BEHAVIORAL CHANGES: Status: ACTIVE | Noted: 2022-08-11

## 2022-09-12 ENCOUNTER — NURSING HOME VISIT (OUTPATIENT)
Dept: GERIATRICS | Facility: CLINIC | Age: 72
End: 2022-09-12
Payer: COMMERCIAL

## 2022-09-12 VITALS
SYSTOLIC BLOOD PRESSURE: 109 MMHG | HEART RATE: 22 BPM | WEIGHT: 125 LBS | HEIGHT: 65 IN | TEMPERATURE: 97.8 F | BODY MASS INDEX: 20.83 KG/M2 | RESPIRATION RATE: 22 BRPM | OXYGEN SATURATION: 98 % | DIASTOLIC BLOOD PRESSURE: 77 MMHG

## 2022-09-12 DIAGNOSIS — G30.9 ALZHEIMER'S DEMENTIA WITHOUT BEHAVIORAL DISTURBANCE, UNSPECIFIED TIMING OF DEMENTIA ONSET: ICD-10-CM

## 2022-09-12 DIAGNOSIS — R79.89 ELEVATED TSH: ICD-10-CM

## 2022-09-12 DIAGNOSIS — F02.80 ALZHEIMER'S DEMENTIA WITHOUT BEHAVIORAL DISTURBANCE, UNSPECIFIED TIMING OF DEMENTIA ONSET: ICD-10-CM

## 2022-09-12 DIAGNOSIS — I10 ESSENTIAL HYPERTENSION: ICD-10-CM

## 2022-09-12 DIAGNOSIS — F02.818 LATE ONSET ALZHEIMER'S DISEASE WITH BEHAVIORAL DISTURBANCE (H): ICD-10-CM

## 2022-09-12 DIAGNOSIS — Z51.5 HOSPICE CARE PATIENT: ICD-10-CM

## 2022-09-12 DIAGNOSIS — K59.00 CONSTIPATION, UNSPECIFIED CONSTIPATION TYPE: ICD-10-CM

## 2022-09-12 DIAGNOSIS — G30.1 LATE ONSET ALZHEIMER'S DISEASE WITH BEHAVIORAL DISTURBANCE (H): ICD-10-CM

## 2022-09-12 DIAGNOSIS — Z86.16 PERSONAL HISTORY OF COVID-19: Primary | ICD-10-CM

## 2022-09-12 PROCEDURE — 99309 SBSQ NF CARE MODERATE MDM 30: CPT | Mod: GW | Performed by: NURSE PRACTITIONER

## 2022-09-12 NOTE — PROGRESS NOTES
[facility] GERIATRICS  Chief Complaint   Patient presents with     assisted Regulatory     [place] Medical Record Number:  [id number]  Place of Service where encounter took place:  [facility] () [[id number]]    HPI:    Shari Graham  is 72 year old (1950), who is being seen today for a federally mandated E/M visit.     Background:    This is a 72-year-old female, with a past medical history significant for Alzheimer's dementia, hypertension and hyperlipidemia, who was last hospitalized at [facility] 8/22/20 through 8/24/20 for increased agitation and confusion. Felt to be secondary to progressing dementia and transferred to [facility] long term care.     Today's concerns are:  Recent COVID-19 Infection. Tested positive on 8/6/22 during outbreak on unit. Asymptomatic.    Hospice Care Patient. Enrolled in [facility] since 1/24/22. Upon review of weights, 125.2 lbs on 3/3/22 -> 125 lbs on 8/16/22.     Alzheimer's Dementia, Anxiety and Delusions. Is laying in bed with her eyes open. Appears comfortable. Nonsensical verbalizations during today's visit.      Hypertension and Hyperlipidemia. Upon review of blood pressures readings over the past month, readings 109/77 and 121/76.     Elevated TSH. Last TSH 6.59 on 3/12/21.     Constipation. Upon review of documentation over the past 5 days, having at least daily bowel movements on average.    ALLERGIES:Patient has no known allergies.  PAST MEDICAL HISTORY:   Past Medical History:   Diagnosis Date     Dementia (H)      HTN, goal below 140/90 8/13/2012     Hyperlipidemia LDL goal <130 8/13/2012     Hypertension      Late onset Alzheimer's disease with behavioral disturbance (H) 8/14/2020     Mixed hyperlipidemia     Hyperlipidemia     PAST SURGICAL HISTORY:   has a past surgical history that includes surgical history of -  (grade school); hysterectomy, pap no longer indicated (1995); surgical history of -  (2011);  colonoscopy; and appendectomy.  FAMILY HISTORY: family history includes Alzheimer Disease in her paternal grandmother; Breast Cancer in her sister and sister; Cancer in her father; Diabetes in her maternal grandmother and mother; Heart Disease in her mother; Hypertension in her mother; Prostate Cancer in her brother, brother, father, and paternal grandfather; Respiratory in her father.  SOCIAL HISTORY:  reports that she quit smoking about 41 years ago. Her smoking use included cigarettes. She has a 5.50 pack-year smoking history. She has never used smokeless tobacco. She reports current alcohol use. She reports that she does not use drugs.    MEDICATIONS:       Review of your medicines          Accurate as of September 12, 2022 11:59 PM. If you have any questions, ask your nurse or doctor.            CONTINUE these medicines which have NOT CHANGED      Dose / Directions   acetaminophen 500 MG tablet  Commonly known as: TYLENOL      Dose: 1,000 mg  Take 1,000 mg by mouth 3 times daily  Refills: 0     cyanocobalamin 100 MCG tablet  Commonly known as: VITAMIN B-12      Dose: 100 mcg  Take 100 mcg by mouth daily  Refills: 0     divalproex sodium delayed-release 125 MG DR tablet  Commonly known as: DEPAKOTE  Used for: Late onset Alzheimer's disease with behavioral disturbance (H)      Dose: 250 mg  Take 2 tablets (250 mg) by mouth 3 times daily  Refills: 0     escitalopram 20 MG tablet  Commonly known as: LEXAPRO      Dose: 20 mg  Take 20 mg by mouth At Bedtime  Refills: 0     mirtazapine 15 MG tablet  Commonly known as: REMERON  Used for: Late onset Alzheimer's disease with behavioral disturbance (H), Anxiety      Dose: 15 mg  Take 1 tablet (15 mg) by mouth At Bedtime  Quantity:    Refills: 0     morphine sulfate 20 mg/mL (HIGH CONC) soln  Commonly known as: ROXANOL      Dose: 2.5 mg  Take 2.5 mg by mouth every 2 hours as needed for shortness of breath / dyspnea or breakthrough pain  Refills: 0     OLANZapine 2.5 MG  "tablet  Commonly known as: zyPREXA      Dose: 2.5 mg  Take 2.5 mg by mouth 2 times daily @ 4 PM  Refills: 0     SENNA-docusate sodium 8.6-50 MG tablet  Commonly known as: SENNA S  Used for: Constipation, unspecified constipation type      Dose: 1 tablet  Take 1 tablet by mouth 2 times daily as needed (constipation)  Refills: 0     Vitamin D (Cholecalciferol) 25 MCG (1000 UT) Caps      Dose: 1,000 Units  Take 1,000 Units by mouth daily  Refills: 0        Case Management:  I have reviewed the care plan and MDS and do agree with the plan. Patient's desire to return to the community is not assessible due to cognitive impairment. Information reviewed:  Medications, vital signs, orders, and nursing notes.    ROS:  Unobtainable secondary to cognitive impairment.     Vitals:  /77   Pulse (!) 22   Temp 97.8  F (36.6  C)   Resp 22   Ht 1.651 m (5' 5\")   Wt 56.7 kg (125 lb)   SpO2 98%   BMI 20.80 kg/m    Body mass index is 20.8 kg/m .  Exam:  GENERAL APPEARANCE:  Alert, in no distress  ENT:  Mouth and posterior oropharynx normal, moist mucous membranes  EYES:  EOM, conjunctivae, lids, pupils and irises normal  RESP:  respiratory effort and palpation of chest normal, lungs clear to auscultation , no respiratory distress, in anterior lobes  CV:  Palpation and auscultation of heart done , regular rate and rhythm, no murmur, rub, or gallop  M/S:   No edema in BLE  SKIN:  Inspection of skin and subcutaneous tissue baseline, Palpation of skin and subcutaneous tissue baseline  NEURO:   Cranial nerves 2-12 are normal tested and grossly at patient's baseline  PSYCH:  memory impaired     Lab/Diagnostic data:   Labs done in SNF are in [place] EPIC. Please refer to them using EPIC/Care Everywhere.    ASSESSMENT/PLAN  Recent COVID-19 Infection. Tested positive on 8/6/22 during outbreak on unit. Asymptomatic.    Hospice Care Patient. Enrolled in [facility] on 1/24/22 given advancing dementia, decline in function, " and weight loss. Goal is comfort.     Alzheimer's Dementia, Anxiety and Delusions. Chronic, progressive. Nonsensical verbalizations. Resides on secure memory care unit. Continue Divalproex, Escitalopram, Mirtazapine, and Olanzapine as ordered.      Hypertension and Hyperlipidemia. Blood pressures < 140/90. Requires no medications.      Elevated TSH. Last TSH 6.59 on 3/12/21 which would be at goal given patient's age and co-morbidities. Given goal of comfort and hospice enrollment, will not check labs.     Constipation. Continue Senna-S PRN as ordered.     Orders:  None    Electronically signed by:  AARON Funez CNP

## 2022-09-12 NOTE — LETTER
9/12/2022        RE: Shari Graham  [facility]  [address] [facility] [address]        [facility] GERIATRICS  Chief Complaint   Patient presents with     longterm Regulatory     [place] Medical Record Number:  [id number]  Place of Service where encounter took place:  [facility] () [[id number]]    HPI:    Shari Graham  is 72 year old (1950), who is being seen today for a federally mandated E/M visit.     Background:    This is a 72-year-old female, with a past medical history significant for Alzheimer's dementia, hypertension and hyperlipidemia, who was last hospitalized at [facility] 8/22/20 through 8/24/20 for increased agitation and confusion. Felt to be secondary to progressing dementia and transferred to [facility] long term care.     Today's concerns are:  Recent COVID-19 Infection. Tested positive on 8/6/22 during outbreak on unit. Asymptomatic.    Hospice Care Patient. Enrolled in [facility] since 1/24/22. Upon review of weights, 125.2 lbs on 3/3/22 -> 125 lbs on 8/16/22.     Alzheimer's Dementia, Anxiety and Delusions. Is laying in bed with her eyes open. Appears comfortable. Nonsensical verbalizations during today's visit.      Hypertension and Hyperlipidemia. Upon review of blood pressures readings over the past month, readings 109/77 and 121/76.     Elevated TSH. Last TSH 6.59 on 3/12/21.     Constipation. Upon review of documentation over the past 5 days, having at least daily bowel movements on average.    ALLERGIES:Patient has no known allergies.  PAST MEDICAL HISTORY:   Past Medical History:   Diagnosis Date     Dementia (H)      HTN, goal below 140/90 8/13/2012     Hyperlipidemia LDL goal <130 8/13/2012     Hypertension      Late onset Alzheimer's disease with behavioral disturbance (H) 8/14/2020     Mixed hyperlipidemia     Hyperlipidemia     PAST SURGICAL HISTORY:   has a past surgical history that includes  surgical history of -  (grade school); hysterectomy, pap no longer indicated (1995); surgical history of -  (2011); colonoscopy; and appendectomy.  FAMILY HISTORY: family history includes Alzheimer Disease in her paternal grandmother; Breast Cancer in her sister and sister; Cancer in her father; Diabetes in her maternal grandmother and mother; Heart Disease in her mother; Hypertension in her mother; Prostate Cancer in her brother, brother, father, and paternal grandfather; Respiratory in her father.  SOCIAL HISTORY:  reports that she quit smoking about 41 years ago. Her smoking use included cigarettes. She has a 5.50 pack-year smoking history. She has never used smokeless tobacco. She reports current alcohol use. She reports that she does not use drugs.    MEDICATIONS:       Review of your medicines          Accurate as of September 12, 2022 11:59 PM. If you have any questions, ask your nurse or doctor.            CONTINUE these medicines which have NOT CHANGED      Dose / Directions   acetaminophen 500 MG tablet  Commonly known as: TYLENOL      Dose: 1,000 mg  Take 1,000 mg by mouth 3 times daily  Refills: 0     cyanocobalamin 100 MCG tablet  Commonly known as: VITAMIN B-12      Dose: 100 mcg  Take 100 mcg by mouth daily  Refills: 0     divalproex sodium delayed-release 125 MG DR tablet  Commonly known as: DEPAKOTE  Used for: Late onset Alzheimer's disease with behavioral disturbance (H)      Dose: 250 mg  Take 2 tablets (250 mg) by mouth 3 times daily  Refills: 0     escitalopram 20 MG tablet  Commonly known as: LEXAPRO      Dose: 20 mg  Take 20 mg by mouth At Bedtime  Refills: 0     mirtazapine 15 MG tablet  Commonly known as: REMERON  Used for: Late onset Alzheimer's disease with behavioral disturbance (H), Anxiety      Dose: 15 mg  Take 1 tablet (15 mg) by mouth At Bedtime  Quantity:    Refills: 0     morphine sulfate 20 mg/mL (HIGH CONC) soln  Commonly known as: ROXANOL      Dose: 2.5 mg  Take 2.5 mg by mouth  "every 2 hours as needed for shortness of breath / dyspnea or breakthrough pain  Refills: 0     OLANZapine 2.5 MG tablet  Commonly known as: zyPREXA      Dose: 2.5 mg  Take 2.5 mg by mouth 2 times daily @ 4 PM  Refills: 0     SENNA-docusate sodium 8.6-50 MG tablet  Commonly known as: SENNA S  Used for: Constipation, unspecified constipation type      Dose: 1 tablet  Take 1 tablet by mouth 2 times daily as needed (constipation)  Refills: 0     Vitamin D (Cholecalciferol) 25 MCG (1000 UT) Caps      Dose: 1,000 Units  Take 1,000 Units by mouth daily  Refills: 0        Case Management:  I have reviewed the care plan and MDS and do agree with the plan. Patient's desire to return to the community is not assessible due to cognitive impairment. Information reviewed:  Medications, vital signs, orders, and nursing notes.    ROS:  Unobtainable secondary to cognitive impairment.     Vitals:  /77   Pulse (!) 22   Temp 97.8  F (36.6  C)   Resp 22   Ht 1.651 m (5' 5\")   Wt 56.7 kg (125 lb)   SpO2 98%   BMI 20.80 kg/m    Body mass index is 20.8 kg/m .  Exam:  GENERAL APPEARANCE:  Alert, in no distress  ENT:  Mouth and posterior oropharynx normal, moist mucous membranes  EYES:  EOM, conjunctivae, lids, pupils and irises normal  RESP:  respiratory effort and palpation of chest normal, lungs clear to auscultation , no respiratory distress, in anterior lobes  CV:  Palpation and auscultation of heart done , regular rate and rhythm, no murmur, rub, or gallop  M/S:   No edema in BLE  SKIN:  Inspection of skin and subcutaneous tissue baseline, Palpation of skin and subcutaneous tissue baseline  NEURO:   Cranial nerves 2-12 are normal tested and grossly at patient's baseline  PSYCH:  memory impaired     Lab/Diagnostic data:   Labs done in SNF are in [place] EPIC. Please refer to them using [employer]/Care Everywhere.    ASSESSMENT/PLAN  Recent COVID-19 Infection. Tested positive on 8/6/22 during outbreak on unit. " Asymptomatic.    Hospice Care Patient. Enrolled in [facility] on 1/24/22 given advancing dementia, decline in function, and weight loss. Goal is comfort.     Alzheimer's Dementia, Anxiety and Delusions. Chronic, progressive. Nonsensical verbalizations. Resides on secure memory care unit. Continue Divalproex, Escitalopram, Mirtazapine, and Olanzapine as ordered.      Hypertension and Hyperlipidemia. Blood pressures < 140/90. Requires no medications.      Elevated TSH. Last TSH 6.59 on 3/12/21 which would be at goal given patient's age and co-morbidities. Given goal of comfort and hospice enrollment, will not check labs.     Constipation. Continue Senna-S PRN as ordered.     Orders:  None    Electronically signed by:  AARON Funez CNP            Sincerely,        AARON Funez CNP

## 2022-09-13 RX ORDER — ACETAMINOPHEN 500 MG
1000 TABLET ORAL 3 TIMES DAILY
COMMUNITY

## 2022-09-21 ENCOUNTER — LAB REQUISITION (OUTPATIENT)
Dept: LAB | Facility: CLINIC | Age: 72
End: 2022-09-21
Payer: MEDICARE

## 2022-09-21 DIAGNOSIS — R41.82 ALTERED MENTAL STATUS, UNSPECIFIED: ICD-10-CM

## 2022-09-23 LAB
ANION GAP SERPL CALCULATED.3IONS-SCNC: 11 MMOL/L (ref 7–15)
BUN SERPL-MCNC: 19 MG/DL (ref 8–23)
CALCIUM SERPL-MCNC: 9.5 MG/DL (ref 8.8–10.2)
CHLORIDE SERPL-SCNC: 101 MMOL/L (ref 98–107)
CREAT SERPL-MCNC: 0.53 MG/DL (ref 0.51–0.95)
DEPRECATED HCO3 PLAS-SCNC: 28 MMOL/L (ref 22–29)
GFR SERPL CREATININE-BSD FRML MDRD: >90 ML/MIN/1.73M2
GLUCOSE SERPL-MCNC: 72 MG/DL (ref 70–99)
POTASSIUM SERPL-SCNC: 4.3 MMOL/L (ref 3.4–5.3)
SODIUM SERPL-SCNC: 140 MMOL/L (ref 136–145)

## 2022-09-23 PROCEDURE — 80048 BASIC METABOLIC PNL TOTAL CA: CPT | Mod: ORL | Performed by: INTERNAL MEDICINE

## 2022-09-23 PROCEDURE — 36415 COLL VENOUS BLD VENIPUNCTURE: CPT | Mod: ORL | Performed by: INTERNAL MEDICINE

## 2022-09-23 PROCEDURE — P9603 ONE-WAY ALLOW PRORATED MILES: HCPCS | Mod: ORL | Performed by: INTERNAL MEDICINE

## 2022-10-09 ENCOUNTER — HEALTH MAINTENANCE LETTER (OUTPATIENT)
Age: 72
End: 2022-10-09

## 2022-11-07 ENCOUNTER — DOCUMENTATION ONLY (OUTPATIENT)
Dept: GERIATRICS | Facility: CLINIC | Age: 72
End: 2022-11-07

## 2022-11-25 ENCOUNTER — NURSING HOME VISIT (OUTPATIENT)
Dept: GERIATRICS | Facility: CLINIC | Age: 72
End: 2022-11-25
Payer: COMMERCIAL

## 2022-11-25 VITALS
WEIGHT: 135.4 LBS | RESPIRATION RATE: 20 BRPM | OXYGEN SATURATION: 97 % | TEMPERATURE: 97.4 F | HEIGHT: 65 IN | SYSTOLIC BLOOD PRESSURE: 126 MMHG | BODY MASS INDEX: 22.56 KG/M2 | HEART RATE: 72 BPM | DIASTOLIC BLOOD PRESSURE: 64 MMHG

## 2022-11-25 DIAGNOSIS — F02.C4 SEVERE LATE ONSET ALZHEIMER'S DEMENTIA WITH ANXIETY (H): Primary | ICD-10-CM

## 2022-11-25 DIAGNOSIS — R13.10 DYSPHAGIA, UNSPECIFIED TYPE: ICD-10-CM

## 2022-11-25 DIAGNOSIS — G30.1 SEVERE LATE ONSET ALZHEIMER'S DEMENTIA WITH ANXIETY (H): Primary | ICD-10-CM

## 2022-11-25 DIAGNOSIS — F41.9 ANXIETY: ICD-10-CM

## 2022-11-25 PROCEDURE — 99308 SBSQ NF CARE LOW MDM 20: CPT | Performed by: INTERNAL MEDICINE

## 2022-11-25 NOTE — LETTER
"    11/25/2022        RE: Shari Graham  [facility]  [address] [facility] [address]        [place] GERIATRIC SERVICES  PHYSICIAN NOTE    Chief Complaint   Patient presents with     senior living Regulatory       HPI:    Shari Graham is a 72 year old  (1950), who is being seen today for a federally mandated E/M visit at [facility].  Admitted to LT in Aug 2020 with progressive dementia. As of January 2022 she signed on to [facility] with terminal illness diagnosis of \"Alzheimer's disease\". She'd had weight loss along with recurrent falls and overall less talkative. She had some medication adjustments and received a Broda chair and that really seemed to help her seem less agitated and more at peace. However due stabilization, she graduated off of hospice cares earlier this month (Nov 2022).    On review of her chart, her weight and vitals recently stable. She had COVID infection in Aug 2022 with several other residents but ultimately pulled through. In talking with her aide today, no concerns and feels Soraya is at baseline. Aide was feeding Soraya a pureed meal and mentioned she seems to take that in well without difficulty. During the visit I tried to engage Soraya but she stared ahead and responded in word salad that I unfortunately was not able to understand.    ALLERGIES: Patient has no known allergies.    Past Medical History:   Diagnosis Date     Dementia (H)      Hyperlipidemia LDL goal <130 08/13/2012     Hypertension       CODE STATUS: DNR/I     MEDICATIONS: Reviewed and updated in Epic according to facility MAR  Current Outpatient Medications   Medication Sig Dispense Refill     acetaminophen (TYLENOL) 500 MG tablet Take 1,000 mg by mouth 3 times daily       cyanocobalamin (VITAMIN B-12) 100 MCG tablet Take 100 mcg by mouth daily       divalproex sodium delayed-release (DEPAKOTE) 250 MG DR tablet Take 250 mg by mouth 2 times daily       escitalopram " "(LEXAPRO) 20 MG tablet Take 20 mg by mouth At Bedtime       mirtazapine (REMERON) 15 MG tablet Take 1 tablet (15 mg) by mouth At Bedtime       morphine sulfate (ROXANOL) 20 mg/mL (HIGH CONC) soln Take 2.5 mg by mouth every 2 hours as needed for shortness of breath / dyspnea or breakthrough pain       OLANZapine (ZYPREXA) 2.5 MG tablet Take 2.5 mg by mouth 2 times daily       SENNA-docusate sodium (SENNA S) 8.6-50 MG tablet Take 1 tablet by mouth 2 times daily as needed (constipation)       Vitamin D, Cholecalciferol, 25 MCG (1000 UT) CAPS Take 1,000 Units by mouth daily         ROS:  Unobtainable secondary to cognitive impairment.     Exam:  /64   Pulse 72   Temp 97.4  F (36.3  C)   Resp 20   Ht 1.651 m (5' 5\")   Wt 61.4 kg (135 lb 6.4 oz)   SpO2 97%   BMI 22.53 kg/m    Alert, sitting up in wheelchair nicely groomed  Breathing non-labored  Able to eat pureed meal with aide assistance without coughing  Stares ahead; does not make eye contact  Responds in word salad   No edema    Lab/Diagnostic Data:    Sept 2022 BMP within normal limits  (ordered in preparation of influenza season)    ASSESSMENT/PLAN:  (G30.1,  F02.C4) Severe late onset Alzheimer's dementia with anxiety (H)  (primary encounter diagnosis)  (F41.9) Anxiety  (R13.10) Dysphagia, unspecified type  No longer on hospice cares but appreciate their help in focusing on comfort; was on [facility] Jan-Nov 2022 and then 'graduated'  Appreciate staff and family dedicated care  She appears comfortable which remains primary goal of care  Tolerated a dose reduction of Depakote from TID down to BID last month  Remains on Zyprexa (historically helpful for her when initiated), Lexapro and Remeron as well  Weight stable on pureed diet with dysphagia r/t advanced dementia  Has not needed any of the PRN Morphine since Feb 2022 so consider discontinuation if that remains the case        Electronically signed by:  Lurdes Sinclair, " DO        Sincerely,        Lurdes Sinclair, DO

## 2022-11-25 NOTE — PROGRESS NOTES
"[place] GERIATRIC SERVICES  PHYSICIAN NOTE    Chief Complaint   Patient presents with     residential Regulatory       HPI:    Shari Graham is a 72 year old  (1950), who is being seen today for a federally mandated E/M visit at [facility].  Admitted to LTC in Aug 2020 with progressive dementia. As of January 2022 she signed on to [facility] with terminal illness diagnosis of \"Alzheimer's disease\". She'd had weight loss along with recurrent falls and overall less talkative. She had some medication adjustments and received a Broda chair and that really seemed to help her seem less agitated and more at peace. However due stabilization, she graduated off of hospice cares earlier this month (Nov 2022).    On review of her chart, her weight and vitals recently stable. She had COVID infection in Aug 2022 with several other residents but ultimately pulled through. In talking with her aide today, no concerns and feels Soraya is at baseline. Aide was feeding Soraya a pureed meal and mentioned she seems to take that in well without difficulty. During the visit I tried to engage Soraya but she stared ahead and responded in word salad that I unfortunately was not able to understand.    ALLERGIES: Patient has no known allergies.    Past Medical History:   Diagnosis Date     Dementia (H)      Hyperlipidemia LDL goal <130 08/13/2012     Hypertension       CODE STATUS: DNR/I     MEDICATIONS: Reviewed and updated in [facility] according to facility MAR  Current Outpatient Medications   Medication Sig Dispense Refill     acetaminophen (TYLENOL) 500 MG tablet Take 1,000 mg by mouth 3 times daily       cyanocobalamin (VITAMIN B-12) 100 MCG tablet Take 100 mcg by mouth daily       divalproex sodium delayed-release (DEPAKOTE) 250 MG DR tablet Take 250 mg by mouth 2 times daily       escitalopram (LEXAPRO) 20 MG tablet Take 20 mg by mouth At Bedtime       mirtazapine (REMERON) 15 MG tablet Take 1 tablet (15 " "mg) by mouth At Bedtime       morphine sulfate (ROXANOL) 20 mg/mL (HIGH CONC) soln Take 2.5 mg by mouth every 2 hours as needed for shortness of breath / dyspnea or breakthrough pain       OLANZapine (ZYPREXA) 2.5 MG tablet Take 2.5 mg by mouth 2 times daily       SENNA-docusate sodium (SENNA S) 8.6-50 MG tablet Take 1 tablet by mouth 2 times daily as needed (constipation)       Vitamin D, Cholecalciferol, 25 MCG (1000 UT) CAPS Take 1,000 Units by mouth daily         ROS:  Unobtainable secondary to cognitive impairment.     Exam:  /64   Pulse 72   Temp 97.4  F (36.3  C)   Resp 20   Ht 1.651 m (5' 5\")   Wt 61.4 kg (135 lb 6.4 oz)   SpO2 97%   BMI 22.53 kg/m    Alert, sitting up in wheelchair nicely groomed  Breathing non-labored  Able to eat pureed meal with aide assistance without coughing  Stares ahead; does not make eye contact  Responds in word salad   No edema    Lab/Diagnostic Data:    Sept 2022 BMP within normal limits  (ordered in preparation of influenza season)    ASSESSMENT/PLAN:  (G30.1,  F02.C4) Severe late onset Alzheimer's dementia with anxiety (H)  (primary encounter diagnosis)  (F41.9) Anxiety  (R13.10) Dysphagia, unspecified type  No longer on hospice cares but appreciate their help in focusing on comfort; was on [facility] Jan-Nov 2022 and then 'graduated'  Appreciate staff and family dedicated care  She appears comfortable which remains primary goal of care  Tolerated a dose reduction of Depakote from TID down to BID last month  Remains on Zyprexa (historically helpful for her when initiated), Lexapro and Remeron as well  Weight stable on pureed diet with dysphagia r/t advanced dementia  Has not needed any of the PRN Morphine since Feb 2022 so consider discontinuation if that remains the case        Electronically signed by:  Lurdes Sinclair DO  "

## 2022-11-25 NOTE — PROGRESS NOTES
[facility] Geriatrics   2022     Name: Shari Graham   : 1950     Background:  Received notification from hospice RN patient will discharge from [facility] at midnight as she no longer meets Medicare criteria for hospice.    Electronically signed by AARON Funez CNP

## 2023-01-07 PROBLEM — R73.09 ABNORMAL BLOOD SUGAR: Status: RESOLVED | Noted: 2017-10-08 | Resolved: 2023-01-07

## 2023-01-07 PROBLEM — R41.82 ALTERED MENTAL STATE: Status: RESOLVED | Noted: 2020-08-22 | Resolved: 2023-01-07

## 2023-01-07 PROBLEM — G93.41 METABOLIC ENCEPHALOPATHY: Status: RESOLVED | Noted: 2022-08-11 | Resolved: 2023-01-07

## 2023-01-07 PROBLEM — Z51.5 HOSPICE CARE PATIENT: Status: RESOLVED | Noted: 2022-05-18 | Resolved: 2023-01-07

## 2023-01-07 PROBLEM — R46.89 COGNITIVE AND BEHAVIORAL CHANGES: Status: RESOLVED | Noted: 2022-08-11 | Resolved: 2023-01-07

## 2023-01-07 PROBLEM — R41.89 COGNITIVE AND BEHAVIORAL CHANGES: Status: RESOLVED | Noted: 2022-08-11 | Resolved: 2023-01-07

## 2023-01-07 PROBLEM — F22 DELUSIONAL DISORDERS (H): Status: RESOLVED | Noted: 2022-01-13 | Resolved: 2023-01-07

## 2023-01-07 RX ORDER — DIVALPROEX SODIUM 250 MG/1
250 TABLET, DELAYED RELEASE ORAL 2 TIMES DAILY
COMMUNITY

## 2023-01-19 ENCOUNTER — NURSING HOME VISIT (OUTPATIENT)
Dept: GERIATRICS | Facility: CLINIC | Age: 73
End: 2023-01-19
Payer: COMMERCIAL

## 2023-01-19 VITALS
WEIGHT: 136 LBS | DIASTOLIC BLOOD PRESSURE: 65 MMHG | RESPIRATION RATE: 19 BRPM | SYSTOLIC BLOOD PRESSURE: 100 MMHG | HEART RATE: 79 BPM | BODY MASS INDEX: 22.63 KG/M2 | TEMPERATURE: 98.2 F | OXYGEN SATURATION: 97 %

## 2023-01-19 DIAGNOSIS — F02.C4 SEVERE LATE ONSET ALZHEIMER'S DEMENTIA WITH ANXIETY (H): ICD-10-CM

## 2023-01-19 DIAGNOSIS — R79.89 ELEVATED TSH: Primary | ICD-10-CM

## 2023-01-19 DIAGNOSIS — G30.1 SEVERE LATE ONSET ALZHEIMER'S DEMENTIA WITH ANXIETY (H): ICD-10-CM

## 2023-01-19 DIAGNOSIS — K59.00 CONSTIPATION, UNSPECIFIED CONSTIPATION TYPE: ICD-10-CM

## 2023-01-19 DIAGNOSIS — I10 ESSENTIAL HYPERTENSION: ICD-10-CM

## 2023-01-19 PROCEDURE — 99309 SBSQ NF CARE MODERATE MDM 30: CPT | Performed by: NURSE PRACTITIONER

## 2023-01-19 NOTE — LETTER
1/19/2023        RE: Shari Graham  [facility]  [address] [facility] [address]        [facility]IATR[facility]  Chief Complaint   Patient presents with     Annual Comprehensive Nursing Home     [facility] Medical Record Number:  [id number]  Place of Service where encounter took place:  [facility] () [[id number]]    HPI:    Shari Graham  is a 72 year old  (1950), who is being seen today for an annual comprehensive visit. HPI information obtained from: facility chart records, facility staff, patient report and [facility] chart review.     Background:    This is a 72-year-old female, with a past medical history significant for Alzheimer's dementia, hypertension and hyperlipidemia, who was last hospitalized at [facility] 8/22/20 through 8/24/20 for increased agitation and confusion. Felt to be secondary to progressing dementia and transferred to [facility] long term care.    Today's concerns are:    Alzheimer's Dementia, Anxiety and Delusions. Is laying in bed today. Appears comfortable. Nonsensical verbalizations during today's visit. Uses a Broda Chair for transport. Incontinent. Assist 1-2 for bed mobility and transfers.     Hypertension and Hyperlipidemia. Upon review of blood pressures readings over the past month, systolic range from 100-113. Diastolic range from 65-77.     Elevated TSH. Last TSH 6.59 on 3/12/21. Upon review of weights, 126.8 lbs on 7/8/22 -> 136 lbs on 1/13/23.     Constipation. Upon review of documentation over the past 5 days, having bowel movements daily on average.    ALLERGIES: Patient has no known allergies.  PAST MEDICAL HISTORY:   Past Medical History:   Diagnosis Date     Dementia (H)      Hyperlipidemia LDL goal <130 08/13/2012     Hypertension       PAST SURGICAL HISTORY:  has a past surgical history that includes surgical history of -  (grade school); hysterectomy, pap no longer indicated (1995); surgical history of -   (2011); colonoscopy; and appendectomy.  IMMUNIZATIONS:  Immunization History   Administered Date(s) Administered     COVID-19 Vaccine 18+ (Moderna) 01/11/2021, 02/08/2021, 11/10/2021, 05/04/2022     COVID-19 Vaccine Bivalent Booster 18+ (Moderna) 09/28/2022     FLU 6-35 months 09/26/2009     Influenza (H1N1) 02/04/2010     Influenza (High Dose) 3 valent vaccine 10/08/2015, 09/20/2016, 10/06/2017, 10/08/2018, 10/08/2019, 10/08/2019, 10/21/2020, 10/07/2021     Influenza (IIV3) PF 10/14/2010, 10/12/2011, 09/22/2012, 10/05/2013, 09/19/2014     Influenza Vaccine 65+ (Fluzone HD) 10/04/2021     Influenza Vaccine >6 months (Alfuria,Fluzone) 09/22/2014     Pneumo Conj 13-V (2010&after) 09/14/2015     Pneumococcal 23 valent 09/08/2014     TDAP Vaccine (Adacel) 11/14/2012     Zoster vaccine recombinant adjuvanted (SHINGRIX) 10/08/2019, 04/08/2020     Zoster vaccine, live 04/09/2015     Above immunizations pulled from [facility]. MIIC and facility records also reconciled. Outstanding information sent to  to update [facility].  Future immunizations needed:  Provider working with patient, first contact, and facility to administer immunizations.      Current Outpatient Medications:      acetaminophen (TYLENOL) 500 MG tablet, Take 1,000 mg by mouth 3 times daily, Disp: , Rfl:      cyanocobalamin (VITAMIN B-12) 100 MCG tablet, Take 100 mcg by mouth daily, Disp: , Rfl:      divalproex sodium delayed-release (DEPAKOTE) 250 MG DR tablet, Take 250 mg by mouth 2 times daily, Disp: , Rfl:      escitalopram (LEXAPRO) 20 MG tablet, Take 20 mg by mouth At Bedtime, Disp: , Rfl:      mirtazapine (REMERON) 15 MG tablet, Take 1 tablet (15 mg) by mouth At Bedtime, Disp:  , Rfl:      morphine sulfate (ROXANOL) 20 mg/mL (HIGH CONC) soln, Take 2.5 mg by mouth every 2 hours as needed for shortness of breath / dyspnea or breakthrough pain, Disp: , Rfl:      OLANZapine (ZYPREXA) 2.5 MG tablet, Take 2.5 mg by mouth 2 times daily,  Disp: , Rfl:      SENNA-docusate sodium (SENNA S) 8.6-50 MG tablet, Take 1 tablet by mouth 2 times daily as needed (constipation), Disp: , Rfl:      Vitamin D, Cholecalciferol, 25 MCG (1000 UT) CAPS, Take 1,000 Units by mouth daily, Disp: , Rfl:      Case Management:  I have reviewed the facility/SNF care plan/MDS, including the falls risk, nutrition and pain screening. I also reviewed the current immunizations, and preventive care.. Future cancer screening is not clinically indicated secondary to age/goals of care Patient's desire to return to the community is not assessible due to cognitive impairment. Current Level of Care is appropriate.    Advance Directive Discussion:    I reviewed the current advanced directives as reflected in EPIC, the POLST and the facility chart, and verified the congruency of orders. I did not due to cognitive impairment review the advance directives with the resident. Patient's goal is unobtainable secondary to cognitive impairment.    Team Discussion:  I communicated with the appropriate disciplines involved with the Plan of Care:   Nursing    Information reviewed:  Medications, vital signs, orders, and nursing notes.    ROS:  Unobtainable secondary to cognitive impairment.     Vitals:  /65   Pulse 79   Temp 98.2  F (36.8  C)   Resp 19   Wt 61.7 kg (136 lb)   SpO2 97%   BMI 22.63 kg/m   Body mass index is 22.63 kg/m .  Exam:  GENERAL APPEARANCE:  Alert, in no distress  ENT:  Mouth and posterior oropharynx normal, moist mucous membranes  EYES:  EOM, conjunctivae, lids, pupils and irises normal  RESP:  respiratory effort and palpation of chest normal, lungs clear to auscultation , no respiratory distress  CV:  Palpation and auscultation of heart done , regular rate and rhythm, no murmur, rub, or gallop  ABDOMEN:  normal bowel sounds, soft, nontender, no hepatosplenomegaly or other masses  M/S:   No edema in BLE  SKIN:  Inspection of skin and subcutaneous tissue baseline,  Palpation of skin and subcutaneous tissue baseline  NEURO:   Cranial nerves 2-12 are normal tested and grossly at patient's baseline  PSYCH:  memory impaired     Lab/Diagnostic data:   Labs done in SNF are in [place] EPIC. Please refer to them using [employer]/Care Everywhere.    ASSESSMENT/PLAN  Alzheimer's Dementia, Anxiety and Delusions. Chronic, progressive. Nonsensical verbalizations. Resides on secure memory care unit. Continue Divalproex, Escitalopram, Mirtazapine, and Olanzapine as ordered. Graduated from [facility] on 11/7/22. Morphine Sulfate remains available as needed.     Hypertension and Hyperlipidemia. Blood pressures < 140/90. Requires no medications. Based on JNC-8 goals,  patients age of 72 year old, no presence of diabetes or CKD, and goals of care goal BP is <150/90 mm Hg. Patient is stable and continue without pharmacological invention with routine assessment..     Elevated TSH. Last TSH 6.59 on 3/12/21 which would be at goal given patient's age and co-morbidities. Given goal of comfort, will check with daughter if she would like repeat labs. Requires no medication.      Constipation. Continue Senna-S PRN as ordered.    Orders:  {None    Electronically signed by:  AARON Funez CNP             Sincerely,        AARON Funez CNP

## 2023-01-19 NOTE — PROGRESS NOTES
[facility]  Chief Complaint   Patient presents with     Annual Comprehensive Nursing Home     [facility] Medical Record Number:  [id number]  Place of Service where encounter took place:  [facility] () [[id number]]    HPI:    Shari Graham  is a 72 year old  (1950), who is being seen today for an annual comprehensive visit. HPI information obtained from: facility chart records, facility staff, patient report and [facility] chart review.     Background:    This is a 72-year-old female, with a past medical history significant for Alzheimer's dementia, hypertension and hyperlipidemia, who was last hospitalized at [facility] 8/22/20 through 8/24/20 for increased agitation and confusion. Felt to be secondary to progressing dementia and transferred to [facility] term [facility].    Today's concerns are:    Alzheimer's Dementia, Anxiety and Delusions. Is laying in bed today. Appears comfortable. Nonsensical verbalizations during today's visit. Uses a Broda Chair for transport. Incontinent. Assist 1-2 for bed mobility and transfers.     Hypertension and Hyperlipidemia. Upon review of blood pressures readings over the past month, systolic range from 100-113. Diastolic range from 65-77.     Elevated TSH. Last TSH 6.59 on 3/12/21. Upon review of weights, 126.8 lbs on 7/8/22 -> 136 lbs on 1/13/23.     Constipation. Upon review of documentation over the past 5 days, having bowel movements daily on average.    ALLERGIES: Patient has no known allergies.  PAST MEDICAL HISTORY:   Past Medical History:   Diagnosis Date     Dementia (H)      Hyperlipidemia LDL goal <130 08/13/2012     Hypertension       PAST SURGICAL HISTORY:  has a past surgical history that includes surgical history of -  (grade school); hysterectomy, pap no longer indicated (1995); surgical history of -  (2011); colonoscopy; and appendectomy.  IMMUNIZATIONS:  Immunization History   Administered Date(s)  Administered     COVID-19 Vaccine 18+ (Moderna) 01/11/2021, 02/08/2021, 11/10/2021, 05/04/2022     COVID-19 Vaccine Bivalent Booster 18+ (Moderna) 09/28/2022     FLU 6-35 months 09/26/2009     Influenza (H1N1) 02/04/2010     Influenza (High Dose) 3 valent vaccine 10/08/2015, 09/20/2016, 10/06/2017, 10/08/2018, 10/08/2019, 10/08/2019, 10/21/2020, 10/07/2021     Influenza (IIV3) PF 10/14/2010, 10/12/2011, 09/22/2012, 10/05/2013, 09/19/2014     Influenza Vaccine 65+ (Fluzone HD) 10/04/2021     Influenza Vaccine >6 months (Alfuria,Fluzone) 09/22/2014     Pneumo Conj 13-V (2010&after) 09/14/2015     Pneumococcal 23 valent 09/08/2014     TDAP Vaccine (Adacel) 11/14/2012     Zoster vaccine recombinant adjuvanted (SHINGRIX) 10/08/2019, 04/08/2020     Zoster vaccine, live 04/09/2015     Above immunizations pulled from [facility]. MIIC and facility records also reconciled. Outstanding information sent to  to update [facility].  Future immunizations needed:  Provider working with patient, first contact, and facility to administer immunizations.      Current Outpatient Medications:      acetaminophen (TYLENOL) 500 MG tablet, Take 1,000 mg by mouth 3 times daily, Disp: , Rfl:      cyanocobalamin (VITAMIN B-12) 100 MCG tablet, Take 100 mcg by mouth daily, Disp: , Rfl:      divalproex sodium delayed-release (DEPAKOTE) 250 MG DR tablet, Take 250 mg by mouth 2 times daily, Disp: , Rfl:      escitalopram (LEXAPRO) 20 MG tablet, Take 20 mg by mouth At Bedtime, Disp: , Rfl:      mirtazapine (REMERON) 15 MG tablet, Take 1 tablet (15 mg) by mouth At Bedtime, Disp:  , Rfl:      morphine sulfate (ROXANOL) 20 mg/mL (HIGH CONC) soln, Take 2.5 mg by mouth every 2 hours as needed for shortness of breath / dyspnea or breakthrough pain, Disp: , Rfl:      OLANZapine (ZYPREXA) 2.5 MG tablet, Take 2.5 mg by mouth 2 times daily, Disp: , Rfl:      SENNA-docusate sodium (SENNA S) 8.6-50 MG tablet, Take 1 tablet by mouth 2 times  daily as needed (constipation), Disp: , Rfl:      Vitamin D, Cholecalciferol, 25 MCG (1000 UT) CAPS, Take 1,000 Units by mouth daily, Disp: , Rfl:      Case Management:  I have reviewed the facility/SNF care plan/MDS, including the falls risk, nutrition and pain screening. I also reviewed the current immunizations, and preventive care.. Future cancer screening is not clinically indicated secondary to age/goals of care Patient's desire to return to the community is not assessible due to cognitive impairment. Current Level of Care is appropriate.    Advance Directive Discussion:    I reviewed the current advanced directives as reflected in EPIC, the POLST and the facility chart, and verified the congruency of orders. I did not due to cognitive impairment review the advance directives with the resident. Patient's goal is unobtainable secondary to cognitive impairment.    Team Discussion:  I communicated with the appropriate disciplines involved with the Plan of Care:   Nursing    Information reviewed:  Medications, vital signs, orders, and nursing notes.    ROS:  Unobtainable secondary to cognitive impairment.     Vitals:  /65   Pulse 79   Temp 98.2  F (36.8  C)   Resp 19   Wt 61.7 kg (136 lb)   SpO2 97%   BMI 22.63 kg/m   Body mass index is 22.63 kg/m .  Exam:  GENERAL APPEARANCE:  Alert, in no distress  ENT:  Mouth and posterior oropharynx normal, moist mucous membranes  EYES:  EOM, conjunctivae, lids, pupils and irises normal  RESP:  respiratory effort and palpation of chest normal, lungs clear to auscultation , no respiratory distress  CV:  Palpation and auscultation of heart done , regular rate and rhythm, no murmur, rub, or gallop  ABDOMEN:  normal bowel sounds, soft, nontender, no hepatosplenomegaly or other masses  M/S:   No edema in BLE  SKIN:  Inspection of skin and subcutaneous tissue baseline, Palpation of skin and subcutaneous tissue baseline  NEURO:   Cranial nerves 2-12 are normal tested and  grossly at patient's baseline  PSYCH:  memory impaired     Lab/Diagnostic data:   Labs done in SNF are in [place] EPIC. Please refer to them using [employer]/Care Everywhere.    ASSESSMENT/PLAN  Alzheimer's Dementia, Anxiety and Delusions. Chronic, progressive. Nonsensical verbalizations. Resides on secure memory care unit. Continue Divalproex, Escitalopram, Mirtazapine, and Olanzapine as ordered. Graduated from [facility] on 11/7/22. Morphine Sulfate remains available as needed.     Hypertension and Hyperlipidemia. Blood pressures < 140/90. Requires no medications. Based on JNC-8 goals,  patients age of 72 year old, no presence of diabetes or CKD, and goals of care goal BP is <150/90 mm Hg. Patient is stable and continue without pharmacological invention with routine assessment..     Elevated TSH. Last TSH 6.59 on 3/12/21 which would be at goal given patient's age and co-morbidities. Given goal of comfort, will check with daughter if she would like repeat labs. Requires no medication.      Constipation. Continue Senna-S PRN as ordered.    Orders:  {None    Electronically signed by:  AARON Funez CNP

## 2023-02-13 ENCOUNTER — TELEPHONE (OUTPATIENT)
Dept: GERIATRICS | Facility: CLINIC | Age: 73
End: 2023-02-13
Payer: COMMERCIAL

## 2023-02-14 NOTE — TELEPHONE ENCOUNTER
[facility]   2023     Name: Shari Graham   : 1950     Background:  Spoke to patient's daughter, Gracie, regarding patient's status. Feels patent has been stable since disenrolling in hospice. Sometimes, will furrow brow when daughter initially visits, but gets more comfortable as her daughter is there longer and touches her hand. Overall goal of care remains comfort. Is in agreement with discontinuing Morphine due to non-use.    Orders:    Discontinue Morphine Sulfate    Electronically signed by AARON Funez CNP

## 2023-02-18 ENCOUNTER — HEALTH MAINTENANCE LETTER (OUTPATIENT)
Age: 73
End: 2023-02-18

## 2023-02-21 ENCOUNTER — CLINICAL UPDATE (OUTPATIENT)
Dept: PHARMACY | Facility: CLINIC | Age: 73
End: 2023-02-21
Payer: COMMERCIAL

## 2023-02-21 DIAGNOSIS — F02.818 LATE ONSET ALZHEIMER'S DISEASE WITH BEHAVIORAL DISTURBANCE (H): Primary | ICD-10-CM

## 2023-02-21 DIAGNOSIS — F41.9 ANXIETY: ICD-10-CM

## 2023-02-21 DIAGNOSIS — F32.A DEPRESSION, UNSPECIFIED DEPRESSION TYPE: ICD-10-CM

## 2023-02-21 DIAGNOSIS — G30.1 LATE ONSET ALZHEIMER'S DISEASE WITH BEHAVIORAL DISTURBANCE (H): Primary | ICD-10-CM

## 2023-02-21 PROCEDURE — 99207 PR NO CHARGE LOS: CPT | Performed by: PHARMACIST

## 2023-02-21 NOTE — PROGRESS NOTES
"This patient's medication list and chart were reviewed as part of the service provided by [facility].    Assessment/Recommendations:    Noted patient previously enrolled in hospice; has since \"graduated\".  Comfort remains goal of care per chart review.    Appears she has tolerated previous reductions in Depakote, and if seems reasonable, may consider further reduction in dose.  May contribute to CNS side effects, weight loss, etc.      Geri Murguia, Pharm.D.,[facility]  Board Certified Geriatric Pharmacist  Medication Therapy Management Pharmacist  [phone]        "

## 2023-03-21 ENCOUNTER — NURSING HOME VISIT (OUTPATIENT)
Dept: GERIATRICS | Facility: CLINIC | Age: 73
End: 2023-03-21
Payer: COMMERCIAL

## 2023-03-21 VITALS
RESPIRATION RATE: 20 BRPM | SYSTOLIC BLOOD PRESSURE: 135 MMHG | WEIGHT: 141.5 LBS | BODY MASS INDEX: 23.57 KG/M2 | HEART RATE: 80 BPM | DIASTOLIC BLOOD PRESSURE: 72 MMHG | HEIGHT: 65 IN | TEMPERATURE: 97.6 F | OXYGEN SATURATION: 93 %

## 2023-03-21 DIAGNOSIS — F02.C3 SEVERE ALZHEIMER'S DEMENTIA WITH MOOD DISTURBANCE, UNSPECIFIED TIMING OF DEMENTIA ONSET (H): Primary | ICD-10-CM

## 2023-03-21 DIAGNOSIS — G30.9 SEVERE ALZHEIMER'S DEMENTIA WITH MOOD DISTURBANCE, UNSPECIFIED TIMING OF DEMENTIA ONSET (H): Primary | ICD-10-CM

## 2023-03-21 DIAGNOSIS — Z86.59 HISTORY OF ANXIETY: ICD-10-CM

## 2023-03-21 DIAGNOSIS — R13.10 DYSPHAGIA, UNSPECIFIED TYPE: ICD-10-CM

## 2023-03-21 PROCEDURE — 99308 SBSQ NF CARE LOW MDM 20: CPT | Performed by: INTERNAL MEDICINE

## 2023-03-21 NOTE — PROGRESS NOTES
"[facility] GERIATRIC SERVICES  PHYSICIAN NOTE    Chief Complaint   Patient presents with     senior care Regulatory       HPI:    Shari Graham is a 72 year old  (1950), who is being seen today for a federally mandated E/M visit at [facility]. She admitted to LTC in Aug 2020 with progressive dementia and associated anxiety. She was receiving hospice cares Jan 2022-Nov 2022 but ultimately \"graduated\" due to stabilization at that time. Goals of care remain comfort based and she seems to have like the broda chair which has helped her relax when she used to pace frequently otherwise.    On review of her chart, vitals stable including weight remaining consistently in the 135-141# range. She remains on pureed diet with staff assistance with eating and resides on memory care unit. Unfortunately there are some COVID positive residents on the unit but she has remained asymptomatic thus far. I spoke with her RN Saravanan who says Soraya is at her baseline and had no specific medical care concerns at this time for me to address. I then went to meet with Soraya who was dozing in her broda chair by the TV in the common room though she responded to my voice. Unfortunately though I tried, I was unable to understand her words which were quite mumbled.       Past Medical History:   Diagnosis Date     Dementia (H)      Hyperlipidemia LDL goal <130 08/13/2012     Hypertension         CODE STATUS: DNR/I    ALLERGIES: Patient has no known allergies.    MEDICATIONS: Reviewed and updated in [facility] according to facility MAR  Current Outpatient Medications   Medication Sig Dispense Refill     acetaminophen (TYLENOL) 500 MG tablet Take 1,000 mg by mouth 3 times daily       cyanocobalamin (VITAMIN B-12) 100 MCG tablet Take 100 mcg by mouth daily       divalproex sodium delayed-release (DEPAKOTE) 250 MG DR tablet Take 250 mg by mouth 2 times daily       escitalopram (LEXAPRO) 20 MG tablet Take 20 mg by mouth At Bedtime       " "mirtazapine (REMERON) 15 MG tablet Take 1 tablet (15 mg) by mouth At Bedtime       OLANZapine (ZYPREXA) 2.5 MG tablet Take 2.5 mg by mouth 2 times daily       SENNA-docusate sodium (SENNA S) 8.6-50 MG tablet Take 1 tablet by mouth 2 times daily as needed (constipation)       Vitamin D, Cholecalciferol, 25 MCG (1000 UT) CAPS Take 1,000 Units by mouth daily         ROS:  Unobtainable secondary to cognitive impairment.     Exam:  /72   Pulse 80   Temp 97.6  F (36.4  C)   Resp 20   Ht 1.651 m (5' 5\")   Wt 64.2 kg (141 lb 8 oz)   SpO2 93%   BMI 23.55 kg/m    Dozing comfortably in broda chair in common room though able to respond to my voice  Poor eye contact  Mumbled speech  Seems comfortable and calm  Breathing non-labored   No edema  Well groomed     Lab/Diagnostic Data:    Sept 2022 BMP within normal limits  (ordered in preparation of influenza season)    ASSESSMENT/PLAN:  Severe Alzheimer's dementia with mood disturbance, unspecified timing of dementia onset (H)  History of anxiety  Dysphagia, unspecified type  Appreciate staff and family dedicated care  Seems to remain comfortable overall and no acute nursing concerns voiced today  Zyprexa has historically been a helpful medication for her; also is on Lexapro, Remeron and Depakote (had a dose reduction from TID to BID Oct 2022)  Weight and VSS on pureed diet  Monitor for COVID symptoms given a few positive residents on the unit      Electronically signed by:  Lurdes Sinclair DO  "

## 2023-03-21 NOTE — LETTER
"    3/21/2023        RE: Shari Garham  [facility]  [address] [address]        [facility] GERIATRIC SERVICES  PHYSICIAN NOTE    Chief Complaint   Patient presents with     [facility] Regulatory       HPI:    Shari Graham is a 72 year old  (1950), who is being seen today for a federally mandated E/M visit at [facility]. She admitted to LTC in Aug 2020 with progressive dementia and associated anxiety. She was receiving hospice cares Jan 2022-Nov 2022 but ultimately \"graduated\" due to stabilization at that time. Goals of care remain comfort based and she seems to have like the broda chair which has helped her relax when she used to pace frequently otherwise.    On review of her chart, vitals stable including weight remaining consistently in the 135-141# range. She remains on pureed diet with staff assistance with eating and resides on memory care unit. Unfortunately there are some COVID positive residents on the unit but she has remained asymptomatic thus far. I spoke with her RN Saravanan who says Soraya is at her baseline and had no specific medical care concerns at this time for me to address. I then went to meet with Soraya who was dozing in her broda chair by the TV in the common room though she responded to my voice. Unfortunately though I tried, I was unable to understand her words which were quite mumbled.       Past Medical History:   Diagnosis Date     Dementia (H)      Hyperlipidemia LDL goal <130 08/13/2012     Hypertension         CODE STATUS: DNR/I    ALLERGIES: Patient has no known allergies.    MEDICATIONS: Reviewed and updated in Epic according to facility MAR  Current Outpatient Medications   Medication Sig Dispense Refill     acetaminophen (TYLENOL) 500 MG tablet Take 1,000 mg by mouth 3 times daily       cyanocobalamin (VITAMIN B-12) 100 MCG tablet Take 100 mcg by mouth daily       divalproex sodium delayed-release (DEPAKOTE) 250 MG DR tablet Take 250 " "mg by mouth 2 times daily       escitalopram (LEXAPRO) 20 MG tablet Take 20 mg by mouth At Bedtime       mirtazapine (REMERON) 15 MG tablet Take 1 tablet (15 mg) by mouth At Bedtime       OLANZapine (ZYPREXA) 2.5 MG tablet Take 2.5 mg by mouth 2 times daily       SENNA-docusate sodium (SENNA S) 8.6-50 MG tablet Take 1 tablet by mouth 2 times daily as needed (constipation)       Vitamin D, Cholecalciferol, 25 MCG (1000 UT) CAPS Take 1,000 Units by mouth daily         ROS:  Unobtainable secondary to cognitive impairment.     Exam:  /72   Pulse 80   Temp 97.6  F (36.4  C)   Resp 20   Ht 1.651 m (5' 5\")   Wt 64.2 kg (141 lb 8 oz)   SpO2 93%   BMI 23.55 kg/m    Dozing comfortably in broda chair in common room though able to respond to my voice  Poor eye contact  Mumbled speech  Seems comfortable and calm  Breathing non-labored   No edema  Well groomed     Lab/Diagnostic Data:    Sept 2022 BMP within normal limits  (ordered in preparation of influenza season)    ASSESSMENT/PLAN:  Severe Alzheimer's dementia with mood disturbance, unspecified timing of dementia onset (H)  History of anxiety  Dysphagia, unspecified type  Appreciate staff and family dedicated care  Seems to remain comfortable overall and no acute nursing concerns voiced today  Zyprexa has historically been a helpful medication for her; also is on Lexapro, Remeron and Depakote (had a dose reduction from TID to BID Oct 2022)  Weight and VSS on pureed diet  Monitor for COVID symptoms given a few positive residents on the unit      Electronically signed by:  Lurdes Sinclair DO        Sincerely,        Lurdes Sinclair DO      "

## 2023-05-18 ENCOUNTER — NURSING HOME VISIT (OUTPATIENT)
Dept: GERIATRICS | Facility: CLINIC | Age: 73
End: 2023-05-18
Payer: COMMERCIAL

## 2023-05-18 VITALS
RESPIRATION RATE: 18 BRPM | HEART RATE: 80 BPM | SYSTOLIC BLOOD PRESSURE: 119 MMHG | BODY MASS INDEX: 23.5 KG/M2 | TEMPERATURE: 97.4 F | DIASTOLIC BLOOD PRESSURE: 73 MMHG | WEIGHT: 141.2 LBS

## 2023-05-18 DIAGNOSIS — I10 ESSENTIAL HYPERTENSION: ICD-10-CM

## 2023-05-18 DIAGNOSIS — K59.00 CONSTIPATION, UNSPECIFIED CONSTIPATION TYPE: ICD-10-CM

## 2023-05-18 DIAGNOSIS — G30.9 SEVERE ALZHEIMER'S DEMENTIA WITH MOOD DISTURBANCE, UNSPECIFIED TIMING OF DEMENTIA ONSET (H): Primary | ICD-10-CM

## 2023-05-18 DIAGNOSIS — F02.C3 SEVERE ALZHEIMER'S DEMENTIA WITH MOOD DISTURBANCE, UNSPECIFIED TIMING OF DEMENTIA ONSET (H): Primary | ICD-10-CM

## 2023-05-18 PROCEDURE — 99309 SBSQ NF CARE MODERATE MDM 30: CPT | Performed by: NURSE PRACTITIONER

## 2023-05-18 NOTE — LETTER
5/18/2023        RE: Shari Graham  C/o Kris Graham  [address] [facility] [address]        [facility] GERIATRICS  Chief Complaint   Patient presents with     retirement Regulatory     [place] Medical Record Number:  [id number]  Place of Service where encounter took place:  [facility] () [[id number]]    HPI:    Shari Graham  is 72 year old (1950), who is being seen today for a federally mandated E/M visit.     Background:    This is a 72-year-old female, with a past medical history significant for Alzheimer's dementia, hypertension and hyperlipidemia, who was last hospitalized at [facility] 8/22/20 through 8/24/20 for increased agitation and confusion. Felt to be secondary to progressing dementia and transferred to [facility] long term [facility].    Today's concerns are:    Alzheimer's Dementia, Anxiety and Delusions. Per staff report, have no concerns related to patient. Met with patient who responds questions with nonsensical answers. Per review of documentation, passive involvement in groups. Weight increase of 6 lbs in 6 months. Unable to complete BIMS. Total assist of one with dressing, grooming, toileting, bed mobility, and transfers. Total assist of one to set-up and feed during all meals time. Uses wheelchair for mobility with total assist of one.      Hypertension. Upon review of blood pressures over the past month, systolic range from 104-119. Diastolic range from 64-73.     Constipation. Upon review of bowel movements over the past 5 days, has had 3 bowel movements.     ALLERGIES:Patient has no known allergies.  PAST MEDICAL HISTORY:   Past Medical History:   Diagnosis Date     Dementia (H)      Hyperlipidemia LDL goal <130 08/13/2012     Hypertension      PAST SURGICAL HISTORY:   has a past surgical history that includes surgical history of -  (grade school); hysterectomy, pap no longer indicated (1995); surgical history of -  (2011);  colonoscopy; and appendectomy.  FAMILY HISTORY: family history includes Alzheimer Disease in her paternal grandmother; Breast Cancer in her sister and sister; Cancer in her father; Diabetes in her maternal grandmother and mother; Heart Disease in her mother; Hypertension in her mother; Prostate Cancer in her brother, brother, father, and paternal grandfather; Respiratory in her father.  SOCIAL HISTORY:  reports that she quit smoking about 41 years ago. Her smoking use included cigarettes. She has a 5.50 pack-year smoking history. She has never used smokeless tobacco. She reports current alcohol use. She reports that she does not use drugs.    MEDICATIONS:     Review of your medicines          Accurate as of May 18, 2023 11:59 PM. If you have any questions, ask your nurse or doctor.            CONTINUE these medicines which have NOT CHANGED      Dose / Directions   acetaminophen 500 MG tablet  Commonly known as: TYLENOL      Dose: 1,000 mg  Take 1,000 mg by mouth 3 times daily  Refills: 0     cyanocobalamin 100 MCG tablet  Commonly known as: VITAMIN B-12      Dose: 100 mcg  Take 100 mcg by mouth daily  Refills: 0     divalproex sodium delayed-release 250 MG DR tablet  Commonly known as: DEPAKOTE      Dose: 250 mg  Take 250 mg by mouth 2 times daily  Refills: 0     escitalopram 20 MG tablet  Commonly known as: LEXAPRO      Dose: 20 mg  Take 20 mg by mouth At Bedtime  Refills: 0     mirtazapine 15 MG tablet  Commonly known as: REMERON  Used for: Late onset Alzheimer's disease with behavioral disturbance (H), Anxiety      Dose: 15 mg  Take 1 tablet (15 mg) by mouth At Bedtime  Quantity:    Refills: 0     OLANZapine 2.5 MG tablet  Commonly known as: zyPREXA      Dose: 2.5 mg  Take 2.5 mg by mouth 2 times daily  Refills: 0     SENNA-docusate sodium 8.6-50 MG tablet  Commonly known as: SENNA S  Used for: Constipation, unspecified constipation type      Dose: 1 tablet  Take 1 tablet by mouth 2 times daily as needed  (constipation)  Refills: 0     Vitamin D (Cholecalciferol) 25 MCG (1000 UT) Caps      Dose: 1,000 Units  Take 1,000 Units by mouth daily  Refills: 0        Case Management:  I have reviewed the care plan and MDS and do agree with the plan. Patient's desire to return to the community is not assessible due to cognitive impairment. Information reviewed:  Medications, vital signs, orders, and nursing notes.    ROS:  Unobtainable secondary to cognitive impairment.     Vitals:  /73   Pulse 80   Temp 97.4  F (36.3  C)   Resp 18   Wt 64 kg (141 lb 3.2 oz)   BMI 23.50 kg/m    Body mass index is 23.5 kg/m .  Exam:  GENERAL APPEARANCE:  Alert, in no distress  ENT:  Mouth and posterior oropharynx normal, moist mucous membranes  EYES:  EOM, conjunctivae, lids, pupils and irises normal  RESP:  no respiratory distress  PSYCH:  memory impaired     Lab/Diagnostic data:   Labs done in SNF are in [facility]. Please refer to them using [facility] Everywhere.    ASSESSMENT/PLAN  Alzheimer's Dementia, Anxiety and Delusions. Chronic, progressive. Nonsensical verbalizations. Resides on secure memory care unit. Continue Divalproex, Escitalopram, Mirtazapine, and Olanzapine as ordered. Left message with daughter, Gracie, to attempt GDR given no recent behaviors. Graduated from [employer] on 11/7/22.      Hypertension and Hyperlipidemia. Blood pressures < 140/90. Requires no medications.      Elevated TSH. Last TSH 6.59 on 3/12/21 which would be at goal given patient's age and co-morbidities. Will not recheck labs given goal of comfort.     Constipation. Continue Senna-S PRN as ordered.     Electronically signed by:  AARON Funez CNP              Sincerely,        AARON Funez CNP

## 2023-05-18 NOTE — PROGRESS NOTES
[facility] GERIATRICS  Chief Complaint   Patient presents with     retirement Regulatory     [place] Medical Record Number:  [id number]  Place of Service where encounter took place:  [facility] () [[id number]]    HPI:    Shari Graham  is 72 year old (1950), who is being seen today for a federally mandated E/M visit.     Background:    This is a 72-year-old female, with a past medical history significant for Alzheimer's dementia, hypertension and hyperlipidemia, who was last hospitalized at [facility] 8/22/20 through 8/24/20 for increased agitation and confusion. Felt to be secondary to progressing dementia and transferred to [facility].    Today's concerns are:    Alzheimer's Dementia, Anxiety and Delusions. Per staff report, have no concerns related to patient. Met with patient who responds questions with nonsensical answers. Per review of documentation, passive involvement in groups. Weight increase of 6 lbs in 6 months. Unable to complete BIMS. Total assist of one with dressing, grooming, toileting, bed mobility, and transfers. Total assist of one to set-up and feed during all meals time. Uses wheelchair for mobility with total assist of one.      Hypertension. Upon review of blood pressures over the past month, systolic range from 104-119. Diastolic range from 64-73.     Constipation. Upon review of bowel movements over the past 5 days, has had 3 bowel movements.     ALLERGIES:Patient has no known allergies.  PAST MEDICAL HISTORY:   Past Medical History:   Diagnosis Date     Dementia (H)      Hyperlipidemia LDL goal <130 08/13/2012     Hypertension      PAST SURGICAL HISTORY:   has a past surgical history that includes surgical history of -  (grade school); hysterectomy, pap no longer indicated (1995); surgical history of -  (2011); colonoscopy; and appendectomy.  FAMILY HISTORY: family history includes Alzheimer Disease in her paternal grandmother;  Breast Cancer in her sister and sister; Cancer in her father; Diabetes in her maternal grandmother and mother; Heart Disease in her mother; Hypertension in her mother; Prostate Cancer in her brother, brother, father, and paternal grandfather; Respiratory in her father.  SOCIAL HISTORY:  reports that she quit smoking about 41 years ago. Her smoking use included cigarettes. She has a 5.50 pack-year smoking history. She has never used smokeless tobacco. She reports current alcohol use. She reports that she does not use drugs.    MEDICATIONS:     Review of your medicines          Accurate as of May 18, 2023 11:59 PM. If you have any questions, ask your nurse or doctor.            CONTINUE these medicines which have NOT CHANGED      Dose / Directions   acetaminophen 500 MG tablet  Commonly known as: TYLENOL      Dose: 1,000 mg  Take 1,000 mg by mouth 3 times daily  Refills: 0     cyanocobalamin 100 MCG tablet  Commonly known as: VITAMIN B-12      Dose: 100 mcg  Take 100 mcg by mouth daily  Refills: 0     divalproex sodium delayed-release 250 MG DR tablet  Commonly known as: DEPAKOTE      Dose: 250 mg  Take 250 mg by mouth 2 times daily  Refills: 0     escitalopram 20 MG tablet  Commonly known as: LEXAPRO      Dose: 20 mg  Take 20 mg by mouth At Bedtime  Refills: 0     mirtazapine 15 MG tablet  Commonly known as: REMERON  Used for: Late onset Alzheimer's disease with behavioral disturbance (H), Anxiety      Dose: 15 mg  Take 1 tablet (15 mg) by mouth At Bedtime  Quantity:    Refills: 0     OLANZapine 2.5 MG tablet  Commonly known as: zyPREXA      Dose: 2.5 mg  Take 2.5 mg by mouth 2 times daily  Refills: 0     SENNA-docusate sodium 8.6-50 MG tablet  Commonly known as: SENNA S  Used for: Constipation, unspecified constipation type      Dose: 1 tablet  Take 1 tablet by mouth 2 times daily as needed (constipation)  Refills: 0     Vitamin D (Cholecalciferol) 25 MCG (1000 UT) Caps      Dose: 1,000 Units  Take 1,000 Units by  mouth daily  Refills: 0        Case Management:  I have reviewed the care plan and MDS and do agree with the plan. Patient's desire to return to the community is not assessible due to cognitive impairment. Information reviewed:  Medications, vital signs, orders, and nursing notes.    ROS:  Unobtainable secondary to cognitive impairment.     Vitals:  /73   Pulse 80   Temp 97.4  F (36.3  C)   Resp 18   Wt 64 kg (141 lb 3.2 oz)   BMI 23.50 kg/m    Body mass index is 23.5 kg/m .  Exam:  GENERAL APPEARANCE:  Alert, in no distress  ENT:  Mouth and posterior oropharynx normal, moist mucous membranes  EYES:  EOM, conjunctivae, lids, pupils and irises normal  RESP:  no respiratory distress  PSYCH:  memory impaired     Lab/Diagnostic data:   Labs done in SNF are in [facility]. Please refer to them using [employer]/Care Everywhere.    ASSESSMENT/PLAN  Alzheimer's Dementia, Anxiety and Delusions. Chronic, progressive. Nonsensical verbalizations. Resides on secure memory care unit. Continue Divalproex, Escitalopram, Mirtazapine, and Olanzapine as ordered. Left message with daughter, Gracie, to attempt GDR given no recent behaviors. Graduated from [employer] on 11/7/22.      Hypertension and Hyperlipidemia. Blood pressures < 140/90. Requires no medications.      Elevated TSH. Last TSH 6.59 on 3/12/21 which would be at goal given patient's age and co-morbidities. Will not recheck labs given goal of comfort.     Constipation. Continue Senna-S PRN as ordered.     Electronically signed by:  AARON Funez CNP

## 2023-06-05 ENCOUNTER — PATIENT OUTREACH (OUTPATIENT)
Dept: GERIATRIC MEDICINE | Facility: CLINIC | Age: 73
End: 2023-06-05
Payer: COMMERCIAL

## 2023-06-05 NOTE — PROGRESS NOTES
Encounter opened due to Regulatory [employer] Update to open FVP Program.    Nathalie Uribe  Care Management Specialist   [facility]   [phone]

## 2023-06-12 ENCOUNTER — PATIENT OUTREACH (OUTPATIENT)
Dept: GERIATRIC MEDICINE | Facility: CLINIC | Age: 73
End: 2023-06-12
Payer: COMMERCIAL

## 2023-06-12 NOTE — LETTER
June 12, 2023    SHARI CHICAS  C/O NEREYDA JUAN FRANCISCO  [address] [facility] [address]    Dear Shari,     Welcome to [facility]'s [place] [facility] Care Plus (MSC+) plan. My name is Phyllis Rea RN. I am your MSC+ care coordinator. You are eligible for Care Coordination through [facility] MSC+.    Here is how Care Coordination works:  I will meet with you to discuss your care needs and health goals.  I will work with your facility to ensure your care needs are being met.  I will review the facility's plan of care for you and help them meet your needs.  If you are discharged from the nursing home, I will help you return to the community.    Our goal is to provide services that will keep you as healthy and independent as possible.     Soon you will receive a new MSC+ member identification (ID) card from [facility]. When you receive it, please use this card along with your Medical Assistance card and your Medicare card (if you have Medicare). You must show this card whenever you get health services.    Being in the [place] [facility] Plus (MSC+) Care Coordination program is voluntary and offered to you at no cost.  If you ever wish to stop being in the Care Coordination program or have questions, call me at [phone]. If you reach my voice mail, leave a message and your phone number. If you are hearing impaired, call the [place] Relay at 659 or [phone] (sxnmsy-dk-mfglrv relay service).    Sincerely,      [email or web address]  Phone: [phone]      [facility]      [id number] accepted     (12/2019)

## 2023-06-12 NOTE — PROGRESS NOTES
[facility] Care Coordination Contact    Member became effective with [facility] on 06/01/23 with Jaspreet MSC+.  Previous Health Plan: [facility] MSC+  Previous Care System: [facility]  UTF received: No UTF to request  Address/Phone discrepancy: N/A    Tessie Herrera  Care Management Specialist Manager  [facility]  [phone]

## 2023-06-19 ENCOUNTER — TELEPHONE (OUTPATIENT)
Dept: GERIATRICS | Facility: CLINIC | Age: 73
End: 2023-06-19
Payer: COMMERCIAL

## 2023-06-19 NOTE — TELEPHONE ENCOUNTER
[facility] Geriatrics   2023     Name: Shari Graham   : 1950     Background:  Spoke to daughter, Gracie, regarding pharmacy recommendations for GDR of medications for mood. While daughter is agreeable to any medication changes recommended, has noticed patient sometimes appears anxious during her visits and furrows her brow. Would prefer to keep medications as ordered as overall goal is comfort. In agreement with no longer checking labs due to goal of comfort.    Electronically signed by AARON Funez CNP

## 2023-06-20 ENCOUNTER — PATIENT OUTREACH (OUTPATIENT)
Dept: GERIATRIC MEDICINE | Facility: CLINIC | Age: 73
End: 2023-06-20
Payer: COMMERCIAL

## 2023-06-21 ASSESSMENT — ACTIVITIES OF DAILY LIVING (ADL)
DEPENDENT_IADLS:: CLEANING;COOKING;LAUNDRY;SHOPPING;MEAL PREPARATION;MEDICATION MANAGEMENT;MONEY MANAGEMENT;TRANSPORTATION;INCONTINENCE

## 2023-06-21 NOTE — PROGRESS NOTES
[facility] Care Coordination Contact    [facility] Institutional Assessment     Institutional Assessment for Health Risk Assessment with Shari Graham completed on June 20, 2023 at [facility]    Type of residence:: Nursing home  Current living arrangement:: I live in a nursing home     Assessment completed with:: Patient, Care Team Member      Mental/Behavioral Health   Depression Screening: Unable to assess due to severe dementia      Mental health DX:: anxiety        Falls Assessment:   Fallen 2 or more times in the past year?: No   Any fall with injury in the past year?: No    ADL/IADL Dependencies:   Dependent ADLs:: Bathing, Dressing, Eating, Grooming, Incontinence, Positioning, Transfers, Wheelchair-with assist, Toileting  Dependent IADLs:: Cleaning, Cooking, Laundry, Shopping, Meal Preparation, Medication Management, Money Management, Transportation, Incontinence      Care Plan & Recommendations: Soraya is dependent in all ADLs and IADLs. Most of her speech is unintelligible but does have occasional clear words or phrases. Family is very involved and are visiting often. Has gained 6 lb in last 6 months.  Is fed a pureed diet. Has difficulty swallowing and food often falls out of mouth.Staff do bring out to the common areas to listen to music and Soraya will often smile when listening. Has a Broda chair in her room and sitting in it seems to have a calming effect.  Kris visits frequently but shared that sometimes it is just too hard for him.  She does not acknowledge that he is there. He shared that he is very happy with the facility and the care given to member.  Discussed options/opportunities for transitions.    See Institutional Care Plan for detailed assessment information.    Obtained a copy of the facility care plan and MDS from facility electronic records. Requested of retirement social worker to put this care coordinator on care conference attendee  list.    Placed the Health Plan facility face sheet in the member's facility chart.    Follow-Up Plan: Member informed of future contact, plan to f/u with member with a 6 month assessment, attend 1 care conference annually, and will follow any hospitalizations or transitions. Care Coordinator contact information shared with member/family and facility, and encouraged to call this care coordinator with any questions or concerns at any time.     [place] care continuum providers: Please see Snapshot and Care Management Flowsheets for Specific details of care plan.    This CC note routed to PCP.    Phyllis Rea RN  [facility]  [phone]

## 2023-07-30 VITALS
OXYGEN SATURATION: 98 % | BODY MASS INDEX: 23.3 KG/M2 | RESPIRATION RATE: 18 BRPM | SYSTOLIC BLOOD PRESSURE: 129 MMHG | WEIGHT: 140 LBS | TEMPERATURE: 97.2 F | HEART RATE: 66 BPM | DIASTOLIC BLOOD PRESSURE: 80 MMHG

## 2023-07-31 ENCOUNTER — NURSING HOME VISIT (OUTPATIENT)
Dept: GERIATRICS | Facility: CLINIC | Age: 73
End: 2023-07-31
Payer: COMMERCIAL

## 2023-07-31 DIAGNOSIS — F02.C4 SEVERE ALZHEIMER'S DEMENTIA WITH ANXIETY, UNSPECIFIED TIMING OF DEMENTIA ONSET (H): Primary | ICD-10-CM

## 2023-07-31 DIAGNOSIS — G30.9 SEVERE ALZHEIMER'S DEMENTIA WITH ANXIETY, UNSPECIFIED TIMING OF DEMENTIA ONSET (H): Primary | ICD-10-CM

## 2023-07-31 PROCEDURE — 99308 SBSQ NF CARE LOW MDM 20: CPT | Performed by: INTERNAL MEDICINE

## 2023-07-31 NOTE — Clinical Note
Leeanna, when you get a chance, could you please adjust her Tylenol dosing or timing? Either reduce the current mg dose to 650 mg or space out the 1000 mg dose to at least 6 hours apart to reduce risk of liver toxicity? Thank you and apologize for not catching this on site this week.

## 2023-07-31 NOTE — LETTER
"    7/31/2023        RE: Shari Graham  C/o Kris Graham  [address] [facility] [address]        [facility] GERIATRIC SERVICES  PHYSICIAN NOTE    Chief Complaint   Patient presents with     custodial Regulatory       HPI:    Shari Graham is a 72 year old  (1950), who is being seen today for a federally mandated E/M visit at [facility].  She admitted to LT in Aug 2020 with progressive dementia and associated anxiety. She was receiving hospice cares Jan 2022-Nov 2022 but ultimately \"graduated\" due to stabilization at that time. Goals of care remain comfort based and she seems to have liked the broda chair which has helped her relax when she used to pace frequently otherwise.     Shari \"Soraya\" is seen in her room sitting up in her broda chair.  She has nice decorations in her room including those of the cartoon character Soraya.  I show her some family pictures which are hanging on the wall and she seems to appreciate this.  Otherwise she is not able to voice any concerns or complaints.  Willing to be wheeled out to the common area to listen in to group event.  My colleague recently spoke with her daughter last month about consideration of GDR medications but due to overall goals of care being comfort based and at times daughter noticing some anxiety, decided to keep current medication regimen unchanged.  No acute nursing concerns today.    Past Medical History:   Diagnosis Date     Dementia (H)      Hyperlipidemia LDL goal <130 08/13/2012     Hypertension         CODE STATUS: DNR/DNI    ALLERGIES: Patient has no known allergies.    MEDICATIONS: Reviewed and updated in [facility] according to facility MAR  Current Outpatient Medications   Medication Sig Dispense Refill     acetaminophen (TYLENOL) 500 MG tablet Take 1,000 mg by mouth 3 times daily       cyanocobalamin (VITAMIN B-12) 100 MCG tablet Take 100 mcg by mouth daily       divalproex sodium delayed-release (DEPAKOTE) 250 " MG DR tablet Take 250 mg by mouth 2 times daily       escitalopram (LEXAPRO) 20 MG tablet Take 20 mg by mouth At Bedtime       mirtazapine (REMERON) 15 MG tablet Take 1 tablet (15 mg) by mouth At Bedtime       OLANZapine (ZYPREXA) 2.5 MG tablet Take 2.5 mg by mouth 2 times daily @ 11 AM and 5 PM       SENNA-docusate sodium (SENNA S) 8.6-50 MG tablet Take 1 tablet by mouth 2 times daily as needed (constipation)       Vitamin D, Cholecalciferol, 25 MCG (1000 UT) CAPS Take 1,000 Units by mouth daily         ROS:  Unobtainable secondary to cognitive impairment.     Exam:  /80   Pulse 66   Temp 97.2  F (36.2  C)   Resp 18   Wt 63.5 kg (140 lb)   SpO2 98%   BMI 23.30 kg/m    Alert, casually dressed, well-groomed, sitting up in Broda chair  Breathing unlabored  Heart tones regular  No edema  Mumbles some word salad, seems to appreciate seeing pictures of her family in her room    Lab/Diagnostic Data:    No routine labs per family request given comfort goals of care    ASSESSMENT/PLAN:  Severe Alzheimer's dementia with anxiety, unspecified timing of dementia onset (H)  Weight stable  Continue current medications as noted above in HPI with goals of care comfort based as well as support of family/staff  She is often out listening to group events  Also had recent on site dentist appointment and working with wellness on hand massage      Electronically signed by:  Lurdes Sinclair DO      Sincerely,        Lurdes Sinclair DO

## 2023-09-05 ENCOUNTER — TELEPHONE (OUTPATIENT)
Dept: GERIATRICS | Facility: CLINIC | Age: 73
End: 2023-09-05
Payer: COMMERCIAL

## 2023-09-05 NOTE — TELEPHONE ENCOUNTER
[facility] Geriatrics   2023     Name: Shari Graham   : 1950     Background:  Per staff report, tested positive for COVID. Coughing. Spoke to daughter, Gracie, who is in agreement with starting antiviral medication. Would prefer patient be able to remain on her medications given interactions with Paxlovid. Also, no recent BMP on file given goal of comfort.    Orders:  Molnupiravir 800 mg PO BID x 5 days    Electronically signed by AARON Funez CNP

## 2023-09-14 ENCOUNTER — NURSING HOME VISIT (OUTPATIENT)
Dept: GERIATRICS | Facility: CLINIC | Age: 73
End: 2023-09-14
Payer: COMMERCIAL

## 2023-09-14 VITALS
TEMPERATURE: 97.2 F | SYSTOLIC BLOOD PRESSURE: 102 MMHG | DIASTOLIC BLOOD PRESSURE: 62 MMHG | OXYGEN SATURATION: 98 % | RESPIRATION RATE: 18 BRPM | WEIGHT: 142.2 LBS | BODY MASS INDEX: 23.69 KG/M2 | HEIGHT: 65 IN | HEART RATE: 78 BPM

## 2023-09-14 DIAGNOSIS — Z86.16 HISTORY OF COVID-19: ICD-10-CM

## 2023-09-14 DIAGNOSIS — G30.9 SEVERE ALZHEIMER'S DEMENTIA WITH ANXIETY, UNSPECIFIED TIMING OF DEMENTIA ONSET (H): Primary | ICD-10-CM

## 2023-09-14 DIAGNOSIS — K59.00 CONSTIPATION, UNSPECIFIED CONSTIPATION TYPE: ICD-10-CM

## 2023-09-14 DIAGNOSIS — F02.C4 SEVERE ALZHEIMER'S DEMENTIA WITH ANXIETY, UNSPECIFIED TIMING OF DEMENTIA ONSET (H): Primary | ICD-10-CM

## 2023-09-14 PROCEDURE — 99309 SBSQ NF CARE MODERATE MDM 30: CPT | Performed by: NURSE PRACTITIONER

## 2023-09-14 NOTE — PROGRESS NOTES
[facility] GERIATRICS  Chief Complaint   Patient presents with    penitentiary Regulatory     [place] Medical Record Number: [id number]  Place of Service Where Encounter Took Place: [facility] () [[id number]]    HPI:   Shari Graham is 73 year old (1950), who is being seen today for a federally mandated E/M visit.     Background:    This is a 72-year-old female, with a past medical history significant for Alzheimer's dementia, hypertension and hyperlipidemia, who was last hospitalized at [facility] 8/22/20 through 8/24/20 for increased agitation and confusion. Felt to be secondary to progressing dementia and transferred to [facility].     Enrolled in [facility] 1/24/22 and graduated 11/7/22. Goal remains comfort.    Today's concerns are:    COVID-19 Positive. On 9/5/23, tested positive for COVID. Had congestion and occasional cough. Molnupiravir ordered x 5 days. Lung sounds documented to be diminished. All other VSS.    Alzheimer's Dementia, Anxiety and Delusions. Today, patient is sitting in her broda chair in her room. Conversation is nonsensical. Appears comfortable. Per review of documentation, unable to complete BIMS due to severe dementia. Weights stable since at least May around 140 lbs.    ALLERGIES: Patient has no known allergies.  PAST MEDICAL HISTORY:   Past Medical History:   Diagnosis Date    Dementia (H)     Hyperlipidemia LDL goal <130 08/13/2012    Hypertension      PAST SURGICAL HISTORY:  has a past surgical history that includes surgical history of -  (grade school); hysterectomy, pap no longer indicated (1995); surgical history of -  (2011); colonoscopy; and appendectomy.  FAMILY HISTORY: family history includes Alzheimer Disease in her paternal grandmother; Breast Cancer in her sister and sister; Cancer in her father; Diabetes in her maternal grandmother and mother; Heart Disease in her mother; Hypertension in her mother;  Prostate Cancer in her brother, brother, father, and paternal grandfather; Respiratory in her father.  SOCIAL HISTORY:  reports that she quit smoking about 42 years ago. Her smoking use included cigarettes. She has a 5.50 pack-year smoking history. She has never used smokeless tobacco. She reports current alcohol use. She reports that she does not use drugs.    MEDICATIONS:     Review of your medicines            Accurate as of September 14, 2023 11:59 PM. If you have any questions, ask your nurse or doctor.                CONTINUE these medicines which have NOT CHANGED        Dose / Directions   acetaminophen 500 MG tablet  Commonly known as: TYLENOL      Dose: 1,000 mg  Take 1,000 mg by mouth 3 times daily  Refills: 0     cyanocobalamin 100 MCG tablet  Commonly known as: VITAMIN B-12      Dose: 100 mcg  Take 100 mcg by mouth daily  Refills: 0     divalproex sodium delayed-release 250 MG DR tablet  Commonly known as: DEPAKOTE      Dose: 250 mg  Take 250 mg by mouth 2 times daily  Refills: 0     escitalopram 20 MG tablet  Commonly known as: LEXAPRO      Dose: 20 mg  Take 20 mg by mouth At Bedtime  Refills: 0     mirtazapine 15 MG tablet  Commonly known as: REMERON  Used for: Late onset Alzheimer's disease with behavioral disturbance (H), Anxiety      Dose: 15 mg  Take 1 tablet (15 mg) by mouth At Bedtime  Quantity:    Refills: 0     molnupiravir 200 MG capsule  Commonly known as: LAGEVRIO      Dose: 800 mg  Take 4 capsules (800 mg) by mouth every 12 hours  Quantity: 40 each  Refills: 0     OLANZapine 2.5 MG tablet  Commonly known as: zyPREXA      Dose: 2.5 mg  Take 2.5 mg by mouth 2 times daily @ 11 AM and 5 PM  Refills: 0     SENNA-docusate sodium 8.6-50 MG tablet  Commonly known as: SENNA S  Used for: Constipation, unspecified constipation type      Dose: 1 tablet  Take 1 tablet by mouth 2 times daily as needed (constipation)  Refills: 0     Vitamin D (Cholecalciferol) 25 MCG (1000 UT) Caps      Dose: 1,000  "Units  Take 1,000 Units by mouth daily  Refills: 0            Case Management: I have reviewed the care plan and MDS and do agree with the plan. Patient's desire to return to the community is not assessible due to cognitive impairment. Information reviewed: Medications, vital signs, orders, and nursing notes.    ROS:  Unobtainable secondary to cognitive impairment.     Vitals:  /62   Pulse 78   Temp 97.2  F (36.2  C)   Resp 18   Ht 1.651 m (5' 5\")   Wt 64.5 kg (142 lb 3.2 oz)   SpO2 98%   BMI 23.66 kg/m    Body mass index is 23.66 kg/m .  Exam:  GENERAL APPEARANCE:  Alert, in no distress  ENT:  Mouth and posterior oropharynx normal, moist mucous membranes  EYES:  EOM, conjunctivae, lids, pupils and irises normal  RESP:  no respiratory distress  PSYCH:  memory impaired     Lab/Diagnostic Data:   Labs done in SNF are in [place] [employer]. Please refer to them using [employer] Everywhere.    ASSESSMENT/PLAN:  COVID-19 Positive 9/5/23. With occasional cough and congestion. Treated with Molnupiravir x 5 days.     Alzheimer's Dementia, Anxiety and Delusions. Chronic, progressive. Nonsensical verbalizations. Resides on secure memory care unit. Continue Divalproex, Escitalopram, Mirtazapine, and Olanzapine as ordered. Graduated from [employer] [facility] [employer] on 11/7/22. Daughter elects to continue with comfort care and will not attempt GDR per her request.     Hypertension and Hyperlipidemia. Blood pressures < 140/90. Requires no medications.      Elevated TSH. Last TSH 6.59 on 3/12/21 which would be at goal given patient's age and co-morbidities. Will not recheck labs given goal of comfort.     Constipation. Continue Senna-S PRN as ordered.    Orders:  None    Electronically signed by: AARON Funez CNP  "

## 2023-09-14 NOTE — LETTER
9/14/2023        RE: Shari Graham  C/o Kris Graham  [address] [address]        [facility] GERIATRICS  Chief Complaint   Patient presents with    assisted Regulatory     [place] Medical Record Number: [id number]  Place of Service Where Encounter Took Place: [facility] () [[id number]]    HPI:   Shari Graham is 73 year old (1950), who is being seen today for a federally mandated E/M visit. Today's concerns are:  {FGS DX:214705}    ALLERGIES: Patient has no known allergies.  PAST MEDICAL HISTORY:   Past Medical History:   Diagnosis Date    Dementia (H)     Hyperlipidemia LDL goal <130 08/13/2012    Hypertension      PAST SURGICAL HISTORY:  has a past surgical history that includes surgical history of -  (grade school); hysterectomy, pap no longer indicated (1995); surgical history of -  (2011); colonoscopy; and appendectomy.  FAMILY HISTORY: family history includes Alzheimer Disease in her paternal grandmother; Breast Cancer in her sister and sister; Cancer in her father; Diabetes in her maternal grandmother and mother; Heart Disease in her mother; Hypertension in her mother; Prostate Cancer in her brother, brother, father, and paternal grandfather; Respiratory in her father.  SOCIAL HISTORY:  reports that she quit smoking about 42 years ago. Her smoking use included cigarettes. She has a 5.50 pack-year smoking history. She has never used smokeless tobacco. She reports current alcohol use. She reports that she does not use drugs.    MEDICATIONS:     Review of your medicines            Accurate as of September 14, 2023 10:53 AM. If you have any questions, ask your nurse or doctor.                CONTINUE these medicines which have NOT CHANGED        Dose / Directions   acetaminophen 500 MG tablet  Commonly known as: TYLENOL      Dose: 1,000 mg  Take 1,000 mg by mouth 3 times daily  Refills: 0     cyanocobalamin 100 MCG tablet  Commonly known as: VITAMIN  "B-12      Dose: 100 mcg  Take 100 mcg by mouth daily  Refills: 0     divalproex sodium delayed-release 250 MG DR tablet  Commonly known as: DEPAKOTE      Dose: 250 mg  Take 250 mg by mouth 2 times daily  Refills: 0     escitalopram 20 MG tablet  Commonly known as: LEXAPRO      Dose: 20 mg  Take 20 mg by mouth At Bedtime  Refills: 0     mirtazapine 15 MG tablet  Commonly known as: REMERON  Used for: Late onset Alzheimer's disease with behavioral disturbance (H), Anxiety      Dose: 15 mg  Take 1 tablet (15 mg) by mouth At Bedtime  Quantity:    Refills: 0     molnupiravir 200 MG capsule  Commonly known as: LAGEVRIO      Dose: 800 mg  Take 4 capsules (800 mg) by mouth every 12 hours  Quantity: 40 each  Refills: 0     OLANZapine 2.5 MG tablet  Commonly known as: zyPREXA      Dose: 2.5 mg  Take 2.5 mg by mouth 2 times daily @ 11 AM and 5 PM  Refills: 0     SENNA-docusate sodium 8.6-50 MG tablet  Commonly known as: SENNA S  Used for: Constipation, unspecified constipation type      Dose: 1 tablet  Take 1 tablet by mouth 2 times daily as needed (constipation)  Refills: 0     Vitamin D (Cholecalciferol) 25 MCG (1000 UT) Caps      Dose: 1,000 Units  Take 1,000 Units by mouth daily  Refills: 0            MED REC REQUIRED{TIP  Click the link below to document or use med rec list, use list to pull in response :038822}  Post Medication Reconciliation Status: {MED REC LIST:507433}    Case Management: I have reviewed the care plan and MDS and do agree with the plan. Patient's desire to return to the community is {FGS RETURN TO COMMUNITY:499378}. Information reviewed: Medications, vital signs, orders, and nursing notes.    ROS:  {ROS FGS:665972}    Vitals:  /62   Pulse 78   Temp 97.2  F (36.2  C)   Resp 18   Ht 1.651 m (5' 5\")   Wt 64.5 kg (142 lb 3.2 oz)   SpO2 98%   BMI 23.66 kg/m    Body mass index is 23.66 kg/m .  Exam:  {Nursing home physical exam :777157}    Lab/Diagnostic Data: "   {fgslab:668023}    ASSESSMENT/PLAN:  {FGS DX:948898}    Orders:  {fgsorders:271515}    {fgstime1:632397}    Electronically signed by: Chani Martínez***      Sincerely,        AARON Funez CNP

## 2023-09-22 ENCOUNTER — TELEPHONE (OUTPATIENT)
Dept: GERIATRICS | Facility: CLINIC | Age: 73
End: 2023-09-22
Payer: COMMERCIAL

## 2023-09-22 NOTE — TELEPHONE ENCOUNTER
[facility] Geriatrics   2023     Name: Shari Graham   : 1950     Background:  Received message from daughter, Gracie, regarding hospice re-enrollment. Weight stable, but patient cannot talk. Sleeps most of the time. Completely vacant when family visiting. Total cares from staff. Questions if she qualifies given end stage of dementia.     Per staff report, at baseline. No obvious decline.    Orders:  Ok for [facility] to re-evaluate and treat    Electronically signed by AARON Funez CNP

## 2023-11-03 ENCOUNTER — NURSING HOME VISIT (OUTPATIENT)
Dept: GERIATRICS | Facility: CLINIC | Age: 73
End: 2023-11-03
Payer: COMMERCIAL

## 2023-11-03 VITALS
TEMPERATURE: 97.3 F | DIASTOLIC BLOOD PRESSURE: 79 MMHG | SYSTOLIC BLOOD PRESSURE: 132 MMHG | BODY MASS INDEX: 23.63 KG/M2 | HEART RATE: 79 BPM | HEIGHT: 65 IN | OXYGEN SATURATION: 98 % | RESPIRATION RATE: 18 BRPM | WEIGHT: 141.8 LBS

## 2023-11-03 DIAGNOSIS — F02.C3 SEVERE LATE ONSET ALZHEIMER'S DEMENTIA WITH MOOD DISTURBANCE (H): Primary | ICD-10-CM

## 2023-11-03 DIAGNOSIS — G30.1 SEVERE LATE ONSET ALZHEIMER'S DEMENTIA WITH MOOD DISTURBANCE (H): Primary | ICD-10-CM

## 2023-11-03 PROCEDURE — 99308 SBSQ NF CARE LOW MDM 20: CPT | Performed by: INTERNAL MEDICINE

## 2023-11-03 NOTE — PROGRESS NOTES
"[place] GERIATRIC SERVICES  PHYSICIAN NOTE    Chief Complaint   Patient presents with    [facility] Regulatory       HPI:    Shari Graham is a 73 year old  (1950), who is being seen today for a federally mandated E/M visit at [facility]. She admitted to LTC in Aug 2020 with progressive dementia and associated anxiety. She was receiving hospice cares Jan 2022-Nov 2022 but ultimately \"graduated\" due to stabilization at that time. Goals of care remain comfort based and she seems to have liked the broda chair which has helped her relax when she used to pace frequently otherwise.     Recent weight and VSS. Had previously been on hospice and 'graduated' due to stability in 2022 but recent re-eval given on-going slow progression of dementia and family continuing to want to focus on comfort, however, she did not qualify. Of note she did have COVID infection early Sept 2023 amid facility outbreak with mild cough; treated with Molnupiravir and recovered.    Soraya is seen sitting up in her Broda chair in the common area just before lunch.  She seems calm and content and well-groomed.  She is unable to make direct eye contact and stares over my shoulder.  The only verbalizations during my visit is she says \"it's okay\". In talking with her nurse she reports that Soraya is stable, no behaviors of concern, and she eats well.    Past Medical History:   Diagnosis Date    Dementia (H)     Hyperlipidemia LDL goal <130 08/13/2012    Hypertension         CODE STATUS: DNR/I    ALLERGIES: Patient has no known allergies.    MEDICATIONS: Reviewed and updated in [facility] according to facility MAR  Current Outpatient Medications   Medication Sig Dispense Refill    acetaminophen (TYLENOL) 500 MG tablet Take 1,000 mg by mouth 3 times daily      cyanocobalamin (VITAMIN B-12) 100 MCG tablet Take 100 mcg by mouth daily      divalproex sodium delayed-release (DEPAKOTE) 250 MG DR tablet Take 250 mg by mouth 2 times daily      " "escitalopram (LEXAPRO) 20 MG tablet Take 20 mg by mouth At Bedtime      mirtazapine (REMERON) 15 MG tablet Take 1 tablet (15 mg) by mouth At Bedtime      OLANZapine (ZYPREXA) 2.5 MG tablet Take 2.5 mg by mouth 2 times daily @ 11 AM and 5 PM      SENNA-docusate sodium (SENNA S) 8.6-50 MG tablet Take 1 tablet by mouth 2 times daily as needed (constipation)      Vitamin D, Cholecalciferol, 25 MCG (1000 UT) CAPS Take 1,000 Units by mouth daily         ROS:  Unobtainable secondary to cognitive impairment.     Exam:  /79   Pulse 79   Temp 97.3  F (36.3  C)   Resp 18   Ht 1.651 m (5' 5\")   Wt 64.3 kg (141 lb 12.8 oz)   SpO2 98%   BMI 23.60 kg/m    Alert, pleasant, nicely groomed, sitting in Broda chair  Moist oral mucosa, smacking lips at times  Staring over my shoulder rather than direct eye contact  Says \"it's okay\"  Breathing nonlabored, no cough  No edema    Lab/Diagnostic Data:    Oct 2023 Cr 0.46 ordered in preparation of influenza season; otherwise no routine phlebotomy on comfort based approach to care    ASSESSMENT/PLAN:  Severe late onset Alzheimer's dementia with mood disturbance (H)  No new orders today; clinically stable but overall in the background of slowly progressive advanced dementia needing long-term care support on memory care unit  H/o prior significant anxiety and seems calmer on current regimen which could also be due to advancing dementia and/or support of staff and/or less mobility (ex: used to pace and no longer can)  Family closely involved and I called her daughter later on to discuss if she has any other specific questions or concerns raised by the family; went to  - family has Laura and welcomed them to reach out there if questions or concerns we can help address          Electronically signed by:  Lurdes Sinclair,   "

## 2023-11-03 NOTE — LETTER
"    11/3/2023        RE: Shari Graham  C/o Kris Graham  [address] [address]        [place] GERIATRIC SERVICES  PHYSICIAN NOTE    Chief Complaint   Patient presents with     prison Regulatory       HPI:    Shari Graham is a 73 year old  (1950), who is being seen today for a federally mandated E/M visit at [facility]. She admitted to LTC in Aug 2020 with progressive dementia and associated anxiety. She was receiving hospice cares Jan 2022-Nov 2022 but ultimately \"graduated\" due to stabilization at that time. Goals of care remain comfort based and she seems to have liked the broda chair which has helped her relax when she used to pace frequently otherwise.     Recent weight and VSS. Had previously been on hospice and 'graduated' due to stability in 2022 but recent re-eval given on-going slow progression of dementia and family continuing to want to focus on comfort, however, she did not qualify. Of note she did have COVID infection early Sept 2023 amid facility outbreak with mild cough; treated with Molnupiravir and recovered.    Soraya is seen sitting up in her Broda chair in the common area just before lunch.  She seems calm and content and well-groomed.  She is unable to make direct eye contact and stares over my shoulder.  The only verbalizations during my visit is she says \"it's okay\". In talking with her nurse she reports that Soraya is stable, no behaviors of concern, and she eats well.    Past Medical History:   Diagnosis Date     Dementia (H)      Hyperlipidemia LDL goal <130 08/13/2012     Hypertension         CODE STATUS: DNR/I    ALLERGIES: Patient has no known allergies.    MEDICATIONS: Reviewed and updated in Epic according to facility MAR  Current Outpatient Medications   Medication Sig Dispense Refill     acetaminophen (TYLENOL) 500 MG tablet Take 1,000 mg by mouth 3 times daily       cyanocobalamin (VITAMIN B-12) 100 MCG tablet Take 100 mcg " "by mouth daily       divalproex sodium delayed-release (DEPAKOTE) 250 MG DR tablet Take 250 mg by mouth 2 times daily       escitalopram (LEXAPRO) 20 MG tablet Take 20 mg by mouth At Bedtime       mirtazapine (REMERON) 15 MG tablet Take 1 tablet (15 mg) by mouth At Bedtime       OLANZapine (ZYPREXA) 2.5 MG tablet Take 2.5 mg by mouth 2 times daily @ 11 AM and 5 PM       SENNA-docusate sodium (SENNA S) 8.6-50 MG tablet Take 1 tablet by mouth 2 times daily as needed (constipation)       Vitamin D, Cholecalciferol, 25 MCG (1000 UT) CAPS Take 1,000 Units by mouth daily         ROS:  Unobtainable secondary to cognitive impairment.     Exam:  /79   Pulse 79   Temp 97.3  F (36.3  C)   Resp 18   Ht 1.651 m (5' 5\")   Wt 64.3 kg (141 lb 12.8 oz)   SpO2 98%   BMI 23.60 kg/m    Alert, pleasant, nicely groomed, sitting in Broda chair  Moist oral mucosa, smacking lips at times  Staring over my shoulder rather than direct eye contact  Says \"it's okay\"  Breathing nonlabored, no cough  No edema    Lab/Diagnostic Data:    Oct 2023 Cr 0.46 ordered in preparation of influenza season; otherwise no routine phlebotomy on comfort based approach to care    ASSESSMENT/PLAN:  Severe late onset Alzheimer's dementia with mood disturbance (H)  No new orders today; clinically stable but overall in the background of slowly progressive advanced dementia needing long-term care support on memory care unit  H/o prior significant anxiety and seems calmer on current regimen which could also be due to advancing dementia and/or support of staff and/or less mobility (ex: used to pace and no longer can)  Family closely involved and I called her daughter later on to discuss if she has any other specific questions or concerns raised by the family; went to  - family has Laura and welcomed them to reach out there if questions or concerns we can help address          Electronically signed by:  Lurdes Sinclair, DO      Sincerely,        Lurdes" Cheryl Sinclair, DO

## 2023-12-18 ENCOUNTER — PATIENT OUTREACH (OUTPATIENT)
Dept: GERIATRIC MEDICINE | Facility: CLINIC | Age: 73
End: 2023-12-18
Payer: COMMERCIAL

## 2023-12-18 NOTE — PROGRESS NOTES
[facility] Care Coordination Contact  [facility] Six-Month Assessment    6 month assessment completed on 12-18-23 with GRISELDA Greenberg.member unable to participate due to dementia.      ER visits: No  Hospitalizations: No  TCU stays: No  Significant health status changes: weight has stablized so is off of hospice but family is still wanting comfort cares only. Is no longer verbal except for some minimal non sensical speech. No longer walks and spends most of her day in the Broda chair where she is today. LSW reports that member is less anxious and seems more content than when she was pacing.   Falls/Injuries: No  ADL/IADL changes: No    Reviewed Institutional Assessment and updated as needed.     Will see member in 6 months for an annual health risk assessment.   Encouraged member to call CC with any questions or concerns in the meantime.      Phyllis Rea RN  [facility]  [phone]

## 2024-01-02 ENCOUNTER — DOCUMENTATION ONLY (OUTPATIENT)
Dept: GERIATRICS | Facility: CLINIC | Age: 74
End: 2024-01-02
Payer: COMMERCIAL

## 2024-01-18 ENCOUNTER — NURSING HOME VISIT (OUTPATIENT)
Dept: GERIATRICS | Facility: CLINIC | Age: 74
End: 2024-01-18
Payer: COMMERCIAL

## 2024-01-18 VITALS
DIASTOLIC BLOOD PRESSURE: 78 MMHG | SYSTOLIC BLOOD PRESSURE: 134 MMHG | WEIGHT: 143.1 LBS | RESPIRATION RATE: 18 BRPM | OXYGEN SATURATION: 98 % | TEMPERATURE: 97.3 F | BODY MASS INDEX: 23.81 KG/M2 | HEART RATE: 69 BPM

## 2024-01-18 DIAGNOSIS — I10 ESSENTIAL HYPERTENSION: ICD-10-CM

## 2024-01-18 DIAGNOSIS — F02.C3 SEVERE LATE ONSET ALZHEIMER'S DEMENTIA WITH MOOD DISTURBANCE (H): Primary | ICD-10-CM

## 2024-01-18 DIAGNOSIS — K59.00 CONSTIPATION, UNSPECIFIED CONSTIPATION TYPE: ICD-10-CM

## 2024-01-18 DIAGNOSIS — G30.1 SEVERE LATE ONSET ALZHEIMER'S DEMENTIA WITH MOOD DISTURBANCE (H): Primary | ICD-10-CM

## 2024-01-18 PROCEDURE — 99309 SBSQ NF CARE MODERATE MDM 30: CPT | Performed by: NURSE PRACTITIONER

## 2024-01-18 NOTE — LETTER
1/18/2024        RE: Shari Graham  C/o Kris Graham  [address] [address]        [facility] GERIATRICS  Chief Complaint   Patient presents with     Annual Comprehensive Nursing Home     [place] Medical Record Number:  [id number]  Place of Service where encounter took place:  [facility] () [[id number]]    HPI:    Shari Graham  is a 73 year old  (1950), who is being seen today for an annual comprehensive visit. HPI information obtained from: facility chart records, facility staff, and [facility] chart review.     Background:    This is a 73-year-old female, with a past medical history significant for Alzheimer's dementia, hypertension and hyperlipidemia, who was last hospitalized at [facility] 8/22/20 through 8/24/20 for increased agitation and confusion. Felt to be secondary to progressing dementia and transferred to [facility] long term [facility].      Enrolled in [facility][note 3] 1/24/22 and graduated 11/7/22. Re-evaluated by [facility] per family request 9/27/23 and did not qualify due to stability. Goal remains comfort.    Today's concern is:    Alzheimer's Dementia, Anxiety, and Delusions. Today, patient is resting in bed. Nonverbal. Seen often in room during visits and not out in common area. Upon review of documentation, requires 1:1 visits from therapeutic rec staff.     Hypertension. Upon review of blood pressures over the past month, systolic range from 121-134. Diastolic range from 70-89.    Constipation. Upon review of documentation over the past 5 days, has had 6 bowel movements.    ALLERGIES: Patient has no known allergies.  PAST MEDICAL HISTORY:   Past Medical History:   Diagnosis Date     Dementia (H)      Hyperlipidemia LDL goal <130 08/13/2012     Hypertension       PAST SURGICAL HISTORY:  has a past surgical history that includes surgical history of -  (grade school); hysterectomy, pap no longer indicated  (1995); surgical history of -  (2011); colonoscopy; and appendectomy.      Current Outpatient Medications:      acetaminophen (TYLENOL) 500 MG tablet, Take 1,000 mg by mouth 3 times daily, Disp: , Rfl:      cyanocobalamin (VITAMIN B-12) 100 MCG tablet, Take 100 mcg by mouth daily, Disp: , Rfl:      divalproex sodium delayed-release (DEPAKOTE) 250 MG DR tablet, Take 250 mg by mouth 2 times daily, Disp: , Rfl:      escitalopram (LEXAPRO) 20 MG tablet, Take 20 mg by mouth At Bedtime, Disp: , Rfl:      mirtazapine (REMERON) 15 MG tablet, Take 1 tablet (15 mg) by mouth At Bedtime, Disp:  , Rfl:      OLANZapine (ZYPREXA) 2.5 MG tablet, Take 2.5 mg by mouth 2 times daily @ 11 AM and 5 PM, Disp: , Rfl:      SENNA-docusate sodium (SENNA S) 8.6-50 MG tablet, Take 1 tablet by mouth 2 times daily as needed (constipation), Disp: , Rfl:      Vitamin D, Cholecalciferol, 25 MCG (1000 UT) CAPS, Take 1,000 Units by mouth daily, Disp: , Rfl:      Case Management:  I have reviewed the facility/SNF care plan/MDS, including the falls risk, nutrition and pain screening. I also reviewed the current immunizations, and preventive care.. Future cancer screening is not clinically indicated secondary to age/goals of care. Patient's desire to return to the community is not assessible due to cognitive impairment. Current Level of Care is appropriate.mhgeroimmunization: PPSV23 and Prevnar 20    Advance Directive Discussion:    I reviewed the current advanced directives as reflected in EPIC, the POLST and the facility chart, and verified the congruency of orders. I did not due to cognitive impairment review the advance directives with the resident.     Team Discussion:  I communicated with the appropriate disciplines involved with the Plan of Care: Nursing  .   Patient's goal is: unobtainable secondary to cognitive impairment.  Information reviewed: Medications, vital signs, orders, and nursing notes.    ROS:  Unobtainable secondary to cognitive  impairment.     Vitals:  /78   Pulse 69   Temp 97.3  F (36.3  C)   Resp 18   Wt 64.9 kg (143 lb 1.6 oz)   SpO2 98%   BMI 23.81 kg/m   Body mass index is 23.81 kg/m .  Exam:  GENERAL APPEARANCE:  Alert, in no distress  ENT:  Mouth and posterior oropharynx normal, moist mucous membranes  EYES:  Eyes closed  RESP:  no respiratory distress  PSYCH:  memory impaired      Lab/Diagnostic data:   Labs done in SNF are in [facility]. Please refer to them using [employer]/Care Everywhere.    ASSESSMENT/PLAN  Alzheimer's Dementia, Anxiety and Delusions. Chronic, progressive. Nonsensical verbalizations. Resides on secure memory care unit. Continue Divalproex, Escitalopram, Mirtazapine, and Olanzapine as ordered. Graduated from [facility] on 11/7/22. Daughter elects to continue with comfort care and will not attempt GDR per her request.     Hypertension and Hyperlipidemia. Blood pressures < 140/90. Requires no medications.      Elevated TSH. Last TSH 6.59 on 3/12/21 which would be at goal given patient's age and co-morbidities. Will not recheck labs given goal of comfort.     Constipation. Continue Senna-S PRN as ordered.    Orders:  None    Electronically signed by:  AARON Funez CNP         Sincerely,        AARNO Funez CNP

## 2024-01-18 NOTE — PROGRESS NOTES
[facility] GERIATRICS  Chief Complaint   Patient presents with    Annual Comprehensive Nursing Home     [place] Medical Record Number:  [id number]  Place of Service where encounter took place:  [facility] () [[id number]]    HPI:    Shari Graham  is a 73 year old  (1950), who is being seen today for an annual comprehensive visit. HPI information obtained from: facility chart records, facility staff, and [facility] chart review.     Background:    This is a 73-year-old female, with a past medical history significant for Alzheimer's dementia, hypertension and hyperlipidemia, who was last hospitalized at [facility] 8/22/20 through 8/24/20 for increased agitation and confusion. Felt to be secondary to progressing dementia and transferred to [facility] long term [facility].      Enrolled in [facility] 1/24/22 and graduated 11/7/22. Re-evaluated by [facility] per family request 9/27/23 and did not qualify due to stability. Goal remains comfort.    Today's concern is:    Alzheimer's Dementia, Anxiety, and Delusions. Today, patient is resting in bed. Nonverbal. Seen often in room during visits and not out in common area. Upon review of documentation, requires 1:1 visits from therapeutic rec staff.     Hypertension. Upon review of blood pressures over the past month, systolic range from 121-134. Diastolic range from 70-89.    Constipation. Upon review of documentation over the past 5 days, has had 6 bowel movements.    ALLERGIES: Patient has no known allergies.  PAST MEDICAL HISTORY:   Past Medical History:   Diagnosis Date    Dementia (H)     Hyperlipidemia LDL goal <130 08/13/2012    Hypertension       PAST SURGICAL HISTORY:  has a past surgical history that includes surgical history of -  (grade school); hysterectomy, pap no longer indicated (1995); surgical history of -  (2011); colonoscopy; and appendectomy.      Current Outpatient Medications:     acetaminophen  (TYLENOL) 500 MG tablet, Take 1,000 mg by mouth 3 times daily, Disp: , Rfl:     cyanocobalamin (VITAMIN B-12) 100 MCG tablet, Take 100 mcg by mouth daily, Disp: , Rfl:     divalproex sodium delayed-release (DEPAKOTE) 250 MG DR tablet, Take 250 mg by mouth 2 times daily, Disp: , Rfl:     escitalopram (LEXAPRO) 20 MG tablet, Take 20 mg by mouth At Bedtime, Disp: , Rfl:     mirtazapine (REMERON) 15 MG tablet, Take 1 tablet (15 mg) by mouth At Bedtime, Disp:  , Rfl:     OLANZapine (ZYPREXA) 2.5 MG tablet, Take 2.5 mg by mouth 2 times daily @ 11 AM and 5 PM, Disp: , Rfl:     SENNA-docusate sodium (SENNA S) 8.6-50 MG tablet, Take 1 tablet by mouth 2 times daily as needed (constipation), Disp: , Rfl:     Vitamin D, Cholecalciferol, 25 MCG (1000 UT) CAPS, Take 1,000 Units by mouth daily, Disp: , Rfl:      Case Management:  I have reviewed the facility/SNF care plan/MDS, including the falls risk, nutrition and pain screening. I also reviewed the current immunizations, and preventive care.. Future cancer screening is not clinically indicated secondary to age/goals of care. Patient's desire to return to the community is not assessible due to cognitive impairment. Current Level of Care is appropriate.mhgeroimmunization: PPSV23 and Prevnar 20    Advance Directive Discussion:    I reviewed the current advanced directives as reflected in EPIC, the POLST and the facility chart, and verified the congruency of orders. I did not due to cognitive impairment review the advance directives with the resident.     Team Discussion:  I communicated with the appropriate disciplines involved with the Plan of Care: Nursing  .   Patient's goal is: unobtainable secondary to cognitive impairment.  Information reviewed: Medications, vital signs, orders, and nursing notes.    ROS:  Unobtainable secondary to cognitive impairment.     Vitals:  /78   Pulse 69   Temp 97.3  F (36.3  C)   Resp 18   Wt 64.9 kg (143 lb 1.6 oz)   SpO2 98%   BMI  23.81 kg/m   Body mass index is 23.81 kg/m .  Exam:  GENERAL APPEARANCE:  Alert, in no distress  ENT:  Mouth and posterior oropharynx normal, moist mucous membranes  EYES:  Eyes closed  RESP:  no respiratory distress  PSYCH:  memory impaired      Lab/Diagnostic data:   Labs done in SNF are in [place] EPIC. Please refer to them using EPIC/Care Everywhere.    ASSESSMENT/PLAN  Alzheimer's Dementia, Anxiety and Delusions. Chronic, progressive. Nonsensical verbalizations. Resides on secure memory care unit. Continue Divalproex, Escitalopram, Mirtazapine, and Olanzapine as ordered. Graduated from [facility] on 11/7/22. Daughter elects to continue with comfort care and will not attempt GDR per her request.     Hypertension and Hyperlipidemia. Blood pressures < 140/90. Requires no medications.      Elevated TSH. Last TSH 6.59 on 3/12/21 which would be at goal given patient's age and co-morbidities. Will not recheck labs given goal of comfort.     Constipation. Continue Senna-S PRN as ordered.    Orders:  None    Electronically signed by:  AARON Funez CNP

## 2024-01-29 PROBLEM — G30.1 SEVERE LATE ONSET ALZHEIMER'S DEMENTIA WITH MOOD DISTURBANCE (H): Status: ACTIVE | Noted: 2020-08-22

## 2024-01-29 PROBLEM — F02.C3 SEVERE LATE ONSET ALZHEIMER'S DEMENTIA WITH MOOD DISTURBANCE (H): Status: ACTIVE | Noted: 2020-08-14

## 2024-01-29 PROBLEM — G30.1 SEVERE LATE ONSET ALZHEIMER'S DEMENTIA WITH MOOD DISTURBANCE (H): Status: ACTIVE | Noted: 2020-08-14

## 2024-01-29 PROBLEM — F02.C3 SEVERE LATE ONSET ALZHEIMER'S DEMENTIA WITH MOOD DISTURBANCE (H): Status: ACTIVE | Noted: 2020-08-22

## 2024-01-29 PROBLEM — F02.818 LATE ONSET ALZHEIMER'S DISEASE WITH BEHAVIORAL DISTURBANCE (H): Status: RESOLVED | Noted: 2020-08-14 | Resolved: 2024-01-29

## 2024-01-29 PROBLEM — G30.1 LATE ONSET ALZHEIMER'S DISEASE WITH BEHAVIORAL DISTURBANCE (H): Status: RESOLVED | Noted: 2020-08-14 | Resolved: 2024-01-29

## 2024-02-06 ENCOUNTER — CLINICAL UPDATE (OUTPATIENT)
Dept: PHARMACY | Facility: CLINIC | Age: 74
End: 2024-02-06
Payer: COMMERCIAL

## 2024-02-06 DIAGNOSIS — F02.818 LATE ONSET ALZHEIMER'S DISEASE WITH BEHAVIORAL DISTURBANCE (H): Primary | ICD-10-CM

## 2024-02-06 DIAGNOSIS — F41.9 ANXIETY: ICD-10-CM

## 2024-02-06 DIAGNOSIS — Z78.9 TAKES DIETARY SUPPLEMENTS: ICD-10-CM

## 2024-02-06 DIAGNOSIS — F32.A DEPRESSION, UNSPECIFIED DEPRESSION TYPE: ICD-10-CM

## 2024-02-06 DIAGNOSIS — G30.1 LATE ONSET ALZHEIMER'S DISEASE WITH BEHAVIORAL DISTURBANCE (H): Primary | ICD-10-CM

## 2024-02-06 PROCEDURE — 99207 PR NO CHARGE LOS: CPT | Performed by: PHARMACIST

## 2024-02-06 NOTE — PROGRESS NOTES
This patient's medication list and chart were reviewed as part of the service provided by [facility].    Assessment/Recommendations:    Noted comfort care focus on chart review, and daughter preference for no GDR attempts due to patient has seemed comfortable.  Appears last B12 in 800s while on current low B12 dose.  Due to comfort care goal, consider stopping B12.      Geri Murguia, Pharm.D.,[facility]  Board Certified Geriatric Pharmacist  Medication Therapy Management Pharmacist  [phone]

## 2024-02-20 ENCOUNTER — PATIENT OUTREACH (OUTPATIENT)
Dept: GERIATRIC MEDICINE | Facility: CLINIC | Age: 74
End: 2024-02-20
Payer: COMMERCIAL

## 2024-02-20 NOTE — PROGRESS NOTES
[facility] Care Coordination Contact  [facility] Six-Month Assessment    6 month assessment completed on 1-23-24 with GRISELDA Greenberg.    ER visits: No  Hospitalizations: No  TCU stays: No  Significant health status changes: no changes to meds, stable  Falls/Injuries: No  ADL/IADL changes: No    Reviewed Institutional Assessment and updated as needed.     Will see member in 6 months for an annual health risk assessment.   Encouraged member to call CC with any questions or concerns in the meantime.     Phyllis Rea RN  [facility]  [phone]

## 2024-03-16 ENCOUNTER — HEALTH MAINTENANCE LETTER (OUTPATIENT)
Age: 74
End: 2024-03-16

## 2024-03-20 ENCOUNTER — NURSING HOME VISIT (OUTPATIENT)
Dept: GERIATRICS | Facility: CLINIC | Age: 74
End: 2024-03-20
Payer: COMMERCIAL

## 2024-03-20 VITALS
HEART RATE: 62 BPM | SYSTOLIC BLOOD PRESSURE: 116 MMHG | DIASTOLIC BLOOD PRESSURE: 68 MMHG | HEIGHT: 65 IN | BODY MASS INDEX: 23.86 KG/M2 | RESPIRATION RATE: 18 BRPM | WEIGHT: 143.2 LBS | TEMPERATURE: 98 F | OXYGEN SATURATION: 94 %

## 2024-03-20 DIAGNOSIS — G30.1 SEVERE LATE ONSET ALZHEIMER'S DEMENTIA WITH MOOD DISTURBANCE (H): Primary | ICD-10-CM

## 2024-03-20 DIAGNOSIS — F02.C3 SEVERE LATE ONSET ALZHEIMER'S DEMENTIA WITH MOOD DISTURBANCE (H): Primary | ICD-10-CM

## 2024-03-20 PROCEDURE — 99308 SBSQ NF CARE LOW MDM 20: CPT | Performed by: INTERNAL MEDICINE

## 2024-03-20 NOTE — LETTER
"    3/20/2024        RE: Shari Graham  C/o Kris Graham  [address] [address]        [place] GERIATRIC SERVICES  PHYSICIAN NOTE    Chief Complaint   Patient presents with     [facility] Regulatory       HPI:    Shari Graham is a 73 year old  (1950), who is being seen today for a federally mandated E/M visit at [facility]. She admitted to LT in Aug 2020 with progressive dementia and associated anxiety. She was receiving hospice cares Jan 2022-Nov 2022 but ultimately \"graduated\" due to stabilization at that time. Goals of care remain comfort based.    Recent weight and VSS. Continues to reside on memory care unit dependent on staff for cares.    Soraya is seen today resting in bed awake this afternoon.  Well-groomed.  Makes eye contact but unable to verbalize her thoughts due to word salad.  Mumbles.  Is able to take her stuffed Soraya doll from me and hold onto it.  No acute nursing concerns and she has otherwise been stable.    Past Medical History:   Diagnosis Date     Dementia (H)      Hyperlipidemia LDL goal <130 08/13/2012     Hypertension         CODE STATUS: DNR/DNI/DNH - comfort approach to care    ALLERGIES: Patient has no known allergies.    MEDICATIONS: Reviewed and updated in Epic according to facility MAR  Current Outpatient Medications   Medication Sig Dispense Refill     acetaminophen (TYLENOL) 500 MG tablet Take 1,000 mg by mouth 3 times daily       cyanocobalamin (VITAMIN B-12) 100 MCG tablet Take 100 mcg by mouth daily       divalproex sodium delayed-release (DEPAKOTE) 250 MG DR tablet Take 250 mg by mouth 2 times daily       escitalopram (LEXAPRO) 20 MG tablet Take 20 mg by mouth At Bedtime       mirtazapine (REMERON) 15 MG tablet Take 1 tablet (15 mg) by mouth At Bedtime       OLANZapine (ZYPREXA) 2.5 MG tablet Take 2.5 mg by mouth 2 times daily @ 11 AM and 5 PM       SENNA-docusate sodium (SENNA S) 8.6-50 MG tablet Take 1 tablet by mouth 2 " "times daily as needed (constipation)       Vitamin D, Cholecalciferol, 25 MCG (1000 UT) CAPS Take 1,000 Units by mouth daily         ROS:  Unobtainable secondary to cognitive impairment.     Exam:  /68   Pulse 62   Temp 98  F (36.7  C)   Resp 18   Ht 1.651 m (5' 5\")   Wt 65 kg (143 lb 3.2 oz)   SpO2 94%   BMI 23.83 kg/m    Lying in bed, nicely groomed, no acute distress  Moist oral mucosa  Mumbles some word salad that is difficult to understand  Breathing nonlabored  Able to hold onto stuffed Soraya doll which is given to her    Lab/Diagnostic Data:    Oct 2023 Cr 0.46 ordered in preparation of influenza season; otherwise no routine phlebotomy on comfort based approach to care     ASSESSMENT/PLAN:  Severe late onset Alzheimer's dementia with history of mood disturbance (H)  Continue current plan of care which is comfort-based focused  Appreciate facility staff and family who take such good care of her  Minimize reduction of meds that could subsequently lead to increased anxiety but always consider this over time      Electronically signed by:  Lurdes Sinclair DO      Sincerely,        Lurdes Sinclair,       "

## 2024-03-20 NOTE — PROGRESS NOTES
"[place] GERIATRIC SERVICES  PHYSICIAN NOTE    Chief Complaint   Patient presents with    custodial Regulatory       HPI:    Shari Graham is a 73 year old  (1950), who is being seen today for a federally mandated E/M visit at [facility]. She admitted to LTC in Aug 2020 with progressive dementia and associated anxiety. She was receiving hospice cares Jan 2022-Nov 2022 but ultimately \"graduated\" due to stabilization at that time. Goals of care remain comfort based.    Recent weight and VSS. Continues to reside on memory care unit dependent on staff for cares.    Soraya is seen today resting in bed awake this afternoon.  Well-groomed.  Makes eye contact but unable to verbalize her thoughts due to word salad.  Mumbles.  Is able to take her stuffed Soraya doll from me and hold onto it.  No acute nursing concerns and she has otherwise been stable.    Past Medical History:   Diagnosis Date    Dementia (H)     Hyperlipidemia LDL goal <130 08/13/2012    Hypertension         CODE STATUS: DNR/DNI/DNH - comfort approach to care    ALLERGIES: Patient has no known allergies.    MEDICATIONS: Reviewed and updated in [facility] according to facility MAR  Current Outpatient Medications   Medication Sig Dispense Refill    acetaminophen (TYLENOL) 500 MG tablet Take 1,000 mg by mouth 3 times daily      cyanocobalamin (VITAMIN B-12) 100 MCG tablet Take 100 mcg by mouth daily      divalproex sodium delayed-release (DEPAKOTE) 250 MG DR tablet Take 250 mg by mouth 2 times daily      escitalopram (LEXAPRO) 20 MG tablet Take 20 mg by mouth At Bedtime      mirtazapine (REMERON) 15 MG tablet Take 1 tablet (15 mg) by mouth At Bedtime      OLANZapine (ZYPREXA) 2.5 MG tablet Take 2.5 mg by mouth 2 times daily @ 11 AM and 5 PM      SENNA-docusate sodium (SENNA S) 8.6-50 MG tablet Take 1 tablet by mouth 2 times daily as needed (constipation)      Vitamin D, Cholecalciferol, 25 MCG (1000 UT) CAPS Take 1,000 Units by mouth daily   " "      ROS:  Unobtainable secondary to cognitive impairment.     Exam:  /68   Pulse 62   Temp 98  F (36.7  C)   Resp 18   Ht 1.651 m (5' 5\")   Wt 65 kg (143 lb 3.2 oz)   SpO2 94%   BMI 23.83 kg/m    Lying in bed, nicely groomed, no acute distress  Moist oral mucosa  Mumbles some word salad that is difficult to understand  Breathing nonlabored  Able to hold onto stuffed Soraya doll which is given to her    Lab/Diagnostic Data:    Oct 2023 Cr 0.46 ordered in preparation of influenza season; otherwise no routine phlebotomy on comfort based approach to care     ASSESSMENT/PLAN:  Severe late onset Alzheimer's dementia with history of mood disturbance (H)  Continue current plan of care which is comfort-based focused  Appreciate facility staff and family who take such good care of her  Minimize reduction of meds that could subsequently lead to increased anxiety but always consider this over time      Electronically signed by:  Lurdes Sinclair DO  "

## 2024-05-10 ENCOUNTER — NURSING HOME VISIT (OUTPATIENT)
Dept: GERIATRICS | Facility: CLINIC | Age: 74
End: 2024-05-10
Payer: COMMERCIAL

## 2024-05-10 VITALS
WEIGHT: 143 LBS | OXYGEN SATURATION: 98 % | SYSTOLIC BLOOD PRESSURE: 132 MMHG | HEART RATE: 76 BPM | DIASTOLIC BLOOD PRESSURE: 82 MMHG | RESPIRATION RATE: 22 BRPM | TEMPERATURE: 97.3 F | BODY MASS INDEX: 23.8 KG/M2

## 2024-05-10 DIAGNOSIS — I10 ESSENTIAL HYPERTENSION: ICD-10-CM

## 2024-05-10 DIAGNOSIS — F02.C3 SEVERE LATE ONSET ALZHEIMER'S DEMENTIA WITH MOOD DISTURBANCE (H): Primary | ICD-10-CM

## 2024-05-10 DIAGNOSIS — K59.00 CONSTIPATION, UNSPECIFIED CONSTIPATION TYPE: ICD-10-CM

## 2024-05-10 DIAGNOSIS — G30.1 SEVERE LATE ONSET ALZHEIMER'S DEMENTIA WITH MOOD DISTURBANCE (H): Primary | ICD-10-CM

## 2024-05-10 PROCEDURE — 99309 SBSQ NF CARE MODERATE MDM 30: CPT | Performed by: NURSE PRACTITIONER

## 2024-05-10 NOTE — PROGRESS NOTES
[facility] GERIATRICS  Chief Complaint   Patient presents with    [facility] Regulatory     [place] Medical Record Number:  [id number]  Place of Service where encounter took place:  [facility] () [[id number]]    HPI:    Shari Graham  is 73 year old (1950), who is being seen today for a federally mandated E/M visit.     Background:    This is a 73-year-old female, with a past medical history significant for Alzheimer's dementia, hypertension and hyperlipidemia, who was last hospitalized at [facility] 8/22/20 through 8/24/20 for increased agitation and confusion. Felt to be secondary to progressing dementia and transferred to [facility] long term [facility].      Enrolled in [facility] 1/24/22 and graduated 11/7/22. Re-evaluated by [facility] per family request 9/27/23 and did not qualify due to stability. Goal remains comfort    Today's concerns are:    Alzheimer's Dementia, Anxiety and Delusions. Today, patient is resting in bed. Opens eyes, but does not talk. Appears comfortable. Upon review of documentation, requires extensive assistance from staff including feeding. Non-ambulatory. Unable to complete BIMS due to advanced dementia. Weight 143.2 lbs 1/5/24 -> 136.5 lbs 5/10/24.     Hypertension and Hyperlipidemia. Upon review of blood pressures over the past month, systolic range from 123-138. Diastolic range from 68-86.     Constipation. Upon review of documentation, has had 2 bowel movements in the past 5 days.    ALLERGIES:Patient has no known allergies.  PAST MEDICAL HISTORY:   Past Medical History:   Diagnosis Date    Dementia (H)     Hyperlipidemia LDL goal <130 08/13/2012    Hypertension      PAST SURGICAL HISTORY:   has a past surgical history that includes surgical history of -  (grade school); hysterectomy, pap no longer indicated (1995); surgical history of -  (2011); colonoscopy; and appendectomy.  FAMILY HISTORY: family history includes Alzheimer  Disease in her paternal grandmother; Breast Cancer in her sister and sister; Cancer in her father; Diabetes in her maternal grandmother and mother; Heart Disease in her mother; Hypertension in her mother; Prostate Cancer in her brother, brother, father, and paternal grandfather; Respiratory in her father.  SOCIAL HISTORY:  reports that she quit smoking about 42 years ago. Her smoking use included cigarettes. She started smoking about 53 years ago. She has a 5.5 pack-year smoking history. She has never used smokeless tobacco. She reports current alcohol use. She reports that she does not use drugs.    MEDICATIONS:     Review of your medicines            Accurate as of May 10, 2024 11:59 PM. If you have any questions, ask your nurse or doctor.                CONTINUE these medicines which have NOT CHANGED        Dose / Directions   acetaminophen 500 MG tablet  Commonly known as: TYLENOL      Dose: 1,000 mg  Take 1,000 mg by mouth 3 times daily  Refills: 0     cyanocobalamin 100 MCG tablet  Commonly known as: VITAMIN B-12      Dose: 100 mcg  Take 100 mcg by mouth daily  Refills: 0     divalproex sodium delayed-release 250 MG DR tablet  Commonly known as: DEPAKOTE      Dose: 250 mg  Take 250 mg by mouth 2 times daily  Refills: 0     escitalopram 20 MG tablet  Commonly known as: LEXAPRO      Dose: 20 mg  Take 20 mg by mouth At Bedtime  Refills: 0     mirtazapine 15 MG tablet  Commonly known as: REMERON  Used for: Late onset Alzheimer's disease with behavioral disturbance (H), Anxiety      Dose: 15 mg  Take 1 tablet (15 mg) by mouth At Bedtime  Quantity:    Refills: 0     OLANZapine 2.5 MG tablet  Commonly known as: zyPREXA      Dose: 2.5 mg  Take 2.5 mg by mouth 2 times daily @ 11 AM and 5 PM  Refills: 0     SENNA-docusate sodium 8.6-50 MG tablet  Commonly known as: SENNA S  Used for: Constipation, unspecified constipation type      Dose: 1 tablet  Take 1 tablet by mouth 2 times daily as needed (constipation)  Refills:  0     Vitamin D (Cholecalciferol) 25 MCG (1000 UT) Caps      Dose: 1,000 Units  Take 1,000 Units by mouth daily  Refills: 0            Case Management:  I have reviewed the care plan and MDS and do agree with the plan. Patient's desire to return to the community is not assessible due to cognitive impairment. Information reviewed:  Medications, vital signs, orders, and nursing notes.    ROS:  Unobtainable secondary to cognitive impairment.     Vitals:  /82   Pulse 76   Temp 97.3  F (36.3  C)   Resp 22   Wt 64.9 kg (143 lb)   SpO2 98%   BMI 23.80 kg/m    Body mass index is 23.8 kg/m .  Exam:  GENERAL APPEARANCE:  Alert, in no distress  ENT:  Mouth and posterior oropharynx normal, moist mucous membranes  EYES:  EOM, conjunctivae, lids, pupils and irises normal  M/S:   No edema in BLE  PSYCH:  memory impaired     Lab/Diagnostic data:   Labs done in SNF are in [place] [facility] [employer]. Please refer to them using [employer]/Care Everywhere.    ASSESSMENT/PLAN  Alzheimer's Dementia, Anxiety and Delusions. Chronic, progressive. Nonsensical verbalizations at times. Resides on secure memory care unit. Continue Divalproex, Escitalopram, Mirtazapine, and Olanzapine as ordered. Graduated from [facility] on 11/7/22. Daughter elects to continue with comfort care and will not attempt GDR per her request.     Hypertension and Hyperlipidemia. Blood pressures < 140/90. Requires no medications.      Elevated TSH. Last TSH 6.59 on 3/12/21 which would be at goal given patient's age and co-morbidities. Will not recheck labs given goal of comfort.     Constipation. Continue Senna-S PRN as ordered.    Orders:  None    Electronically signed by:  AARON Funez CNP

## 2024-05-10 NOTE — LETTER
5/10/2024        RE: Shari Graham  C/o Kris Graham  [address] [address]        [facility] GERIATRICS  Chief Complaint   Patient presents with     [facility] Regulatory     [place] Medical Record Number:  [id number]  Place of Service where encounter took place:  [facility] () [[id number]]    HPI:    Shari Graham  is 73 year old (1950), who is being seen today for a federally mandated E/M visit.     Background:    This is a 73-year-old female, with a past medical history significant for Alzheimer's dementia, hypertension and hyperlipidemia, who was last hospitalized at [facility] 8/22/20 through 8/24/20 for increased agitation and confusion. Felt to be secondary to progressing dementia and transferred to [facility] long term care.      Enrolled in [facility] 1/24/22 and graduated 11/7/22. Re-evaluated by [facility] per family request 9/27/23 and did not qualify due to stability. Goal remains comfort    Today's concerns are:    Alzheimer's Dementia, Anxiety and Delusions. Today, patient is resting in bed. Opens eyes, but does not talk. Appears comfortable. Upon review of documentation, requires extensive assistance from staff including feeding. Non-ambulatory. Unable to complete BIMS due to advanced dementia. Weight 143.2 lbs 1/5/24 -> 136.5 lbs 5/10/24.     Hypertension and Hyperlipidemia. Upon review of blood pressures over the past month, systolic range from 123-138. Diastolic range from 68-86.     Constipation. Upon review of documentation, has had 2 bowel movements in the past 5 days.    ALLERGIES:Patient has no known allergies.  PAST MEDICAL HISTORY:   Past Medical History:   Diagnosis Date     Dementia (H)      Hyperlipidemia LDL goal <130 08/13/2012     Hypertension      PAST SURGICAL HISTORY:   has a past surgical history that includes surgical history of -  (grade school); hysterectomy, pap no longer indicated  (1995); surgical history of -  (2011); colonoscopy; and appendectomy.  FAMILY HISTORY: family history includes Alzheimer Disease in her paternal grandmother; Breast Cancer in her sister and sister; Cancer in her father; Diabetes in her maternal grandmother and mother; Heart Disease in her mother; Hypertension in her mother; Prostate Cancer in her brother, brother, father, and paternal grandfather; Respiratory in her father.  SOCIAL HISTORY:  reports that she quit smoking about 42 years ago. Her smoking use included cigarettes. She started smoking about 53 years ago. She has a 5.5 pack-year smoking history. She has never used smokeless tobacco. She reports current alcohol use. She reports that she does not use drugs.    MEDICATIONS:     Review of your medicines            Accurate as of May 10, 2024 11:59 PM. If you have any questions, ask your nurse or doctor.                CONTINUE these medicines which have NOT CHANGED        Dose / Directions   acetaminophen 500 MG tablet  Commonly known as: TYLENOL      Dose: 1,000 mg  Take 1,000 mg by mouth 3 times daily  Refills: 0     cyanocobalamin 100 MCG tablet  Commonly known as: VITAMIN B-12      Dose: 100 mcg  Take 100 mcg by mouth daily  Refills: 0     divalproex sodium delayed-release 250 MG DR tablet  Commonly known as: DEPAKOTE      Dose: 250 mg  Take 250 mg by mouth 2 times daily  Refills: 0     escitalopram 20 MG tablet  Commonly known as: LEXAPRO      Dose: 20 mg  Take 20 mg by mouth At Bedtime  Refills: 0     mirtazapine 15 MG tablet  Commonly known as: REMERON  Used for: Late onset Alzheimer's disease with behavioral disturbance (H), Anxiety      Dose: 15 mg  Take 1 tablet (15 mg) by mouth At Bedtime  Quantity:    Refills: 0     OLANZapine 2.5 MG tablet  Commonly known as: zyPREXA      Dose: 2.5 mg  Take 2.5 mg by mouth 2 times daily @ 11 AM and 5 PM  Refills: 0     SENNA-docusate sodium 8.6-50 MG tablet  Commonly known as: SENNA S  Used for: Constipation,  unspecified constipation type      Dose: 1 tablet  Take 1 tablet by mouth 2 times daily as needed (constipation)  Refills: 0     Vitamin D (Cholecalciferol) 25 MCG (1000 UT) Caps      Dose: 1,000 Units  Take 1,000 Units by mouth daily  Refills: 0            Case Management:  I have reviewed the care plan and MDS and do agree with the plan. Patient's desire to return to the community is not assessible due to cognitive impairment. Information reviewed:  Medications, vital signs, orders, and nursing notes.    ROS:  Unobtainable secondary to cognitive impairment.     Vitals:  /82   Pulse 76   Temp 97.3  F (36.3  C)   Resp 22   Wt 64.9 kg (143 lb)   SpO2 98%   BMI 23.80 kg/m    Body mass index is 23.8 kg/m .  Exam:  GENERAL APPEARANCE:  Alert, in no distress  ENT:  Mouth and posterior oropharynx normal, moist mucous membranes  EYES:  EOM, conjunctivae, lids, pupils and irises normal  M/S:   No edema in BLE  PSYCH:  memory impaired     Lab/Diagnostic data:   Labs done in SNF are in [facility]. Please refer to them using [facility] Everywhere.    ASSESSMENT/PLAN  Alzheimer's Dementia, Anxiety and Delusions. Chronic, progressive. Nonsensical verbalizations at times. Resides on secure memory care unit. Continue Divalproex, Escitalopram, Mirtazapine, and Olanzapine as ordered. Graduated from [facility] [employer] [facility] on 11/7/22. Daughter elects to continue with comfort care and will not attempt GDR per her request.     Hypertension and Hyperlipidemia. Blood pressures < 140/90. Requires no medications.      Elevated TSH. Last TSH 6.59 on 3/12/21 which would be at goal given patient's age and co-morbidities. Will not recheck labs given goal of comfort.     Constipation. Continue Senna-S PRN as ordered.    Orders:  None    Electronically signed by:  AARON Funez CNP          Sincerely,        AARON Funez CNP

## 2024-05-15 ENCOUNTER — PATIENT OUTREACH (OUTPATIENT)
Dept: GERIATRIC MEDICINE | Facility: CLINIC | Age: 74
End: 2024-05-15
Payer: COMMERCIAL

## 2024-05-15 NOTE — PROGRESS NOTES
[facility] Care Coordination Contact    Received after visit chart from care coordinator.  Completed following tasks: Mailed [facility] POC Letter to member/rep with Support Plan & Signature Page    Flor Koenig  Care Management Specialist  [facility]  [phone]

## 2024-05-15 NOTE — LETTER
May 15, 2024    EVA CHICAS  C/O NEREYDA CHICAS  [address] [address]        Dear Eva:    At [facility], we re dedicated to improving your health and wellness. Enclosed is the Care Plan developed with you on 5/14/2024. Please review the Care Plan carefully.    As a reminder, during your visit we talked about:  Ways to manage your physical and mental health  Using health care to maintain and improve your health   Your preventive care needs     Remember to contact your care coordinator if you:  Are hospitalized, or plan to be hospitalized   Have a fall    Have a change in your physical or mental health  Need help finding support or services    If you have questions, or don t agree with your Care Plan, call me at [phone]. You can also call me if your needs change. TTY users, call the [place] Relay at (148) or 1-310.162.1516 (wvgjth-og-crmvay relay service).    Sincerely,        JOSÉ [email or web address]  Phone: [phone]      [employer]    [id number] accepted    [id number] (07/2022)

## 2024-05-21 NOTE — PROGRESS NOTES
[facility] Care Coordination Contact  [facility] Institutional Assessment     Institutional Assessment for Health Risk Assessment with Shari Castilloomayra completed on May 15, 2024 at [facility]    Type of residence:: Nursing home  Current living arrangement:: I live in a nursing home     Assessment completed with:: Patient, Care Team Member La NGUYEN GRISELDA, Spouse .    Mental/Behavioral Health   Depression Screening:         Mental health DX:: Yes (psychosis)   Mental health DX how managed:: Medication    Falls Assessment:   Fallen 2 or more times in the past year?: No   Any fall with injury in the past year?: No    ADL/IADL Dependencies:   Dependent ADLs:: Bathing, Dressing, Eating, Grooming, Incontinence, Positioning, Transfers, Wheelchair-with assist  Dependent IADLs:: Cleaning, Cooking, Laundry, Shopping, Meal Preparation, Medication Management, Money Management, Transportation, Incontinence      Care Plan & Recommendations: member has lived at [facility] since 8-4-2020. She is oriented to name only. Staff bring Soraya out to the common area for music.Is dependent in all ADLs and IADLs. She uses a BRODA chair for mobility. She does not respond verbally but does talk in a rambling fashion at times.  Has had multiple falls since admission but without injury. She is now on comfort based care. Family is very involved. Is unable to use call light for needs so staff need to anticipate member's needs. Discussed options/opportunities for transitions.    See Institutional Care Plan for detailed assessment information.    Obtained a copy of the facility care plan and MDS from facility electronic records. Requested of [facility] social worker to put this care coordinator on care conference attendee list.    Placed the Health Plan facility face sheet in the member's facility chart.    Follow-Up Plan: Member informed of future contact, plan to f/u with member with a 6 month assessment, attend 1  care conference annually, and will follow any hospitalizations or transitions. Care Coordinator contact information shared with member/family and facility, and encouraged to call this care coordinator with any questions or concerns at any time.     [place] care continuum providers: Please see Snapshot and Care Management Flowsheets for Specific details of care plan.    This CC note routed to PCP, Leeanna Martinez.    Phyllis Rea RN  [facility]  [phone]

## 2024-07-10 ENCOUNTER — NURSING HOME VISIT (OUTPATIENT)
Dept: GERIATRICS | Facility: CLINIC | Age: 74
End: 2024-07-10
Payer: COMMERCIAL

## 2024-07-10 VITALS
RESPIRATION RATE: 18 BRPM | DIASTOLIC BLOOD PRESSURE: 78 MMHG | HEART RATE: 67 BPM | BODY MASS INDEX: 22.57 KG/M2 | OXYGEN SATURATION: 96 % | SYSTOLIC BLOOD PRESSURE: 114 MMHG | WEIGHT: 135.5 LBS | TEMPERATURE: 97.5 F | HEIGHT: 65 IN

## 2024-07-10 DIAGNOSIS — F02.C3 SEVERE LATE ONSET ALZHEIMER'S DEMENTIA WITH MOOD DISTURBANCE (H): Primary | ICD-10-CM

## 2024-07-10 DIAGNOSIS — G30.1 SEVERE LATE ONSET ALZHEIMER'S DEMENTIA WITH MOOD DISTURBANCE (H): Primary | ICD-10-CM

## 2024-07-10 DIAGNOSIS — Z86.59 HISTORY OF ANXIETY: ICD-10-CM

## 2024-07-10 DIAGNOSIS — R13.10 DYSPHAGIA, UNSPECIFIED TYPE: ICD-10-CM

## 2024-07-10 DIAGNOSIS — R63.4 WEIGHT LOSS: ICD-10-CM

## 2024-07-10 PROCEDURE — 99308 SBSQ NF CARE LOW MDM 20: CPT | Performed by: INTERNAL MEDICINE

## 2024-07-10 NOTE — LETTER
" 7/10/2024      Shari Graham  C/o Kris Graham  [address] [address]        [place] [facility]  PHYSICIAN NOTE    Chief Complaint   Patient presents with     prison Regulatory       HPI:    Shari Graham is a 73 year old  (1950), who is being seen today for a federally mandated E/M visit at [facility]. She admitted to LT in Aug 2020 with progressive dementia and associated significant anxiety. She was receiving hospice cares Jan 2022-Nov 2022 but ultimately \"graduated\" due to stabilization at that time. Goals of care remain comfort based.     Recent vitals: Afebrile, blood pressure 110-140/60-70, heart rate 60-80.  Weight has slowly drifted down about 10 pounds over the past year from approximately 140s pounds down to approximately 130s pounds.    I spoke with nursing staff and she is stable.  No acute concerns.  Continues on a puréed diet with staff assistance.  They do note the weight loss as well.  Goals of care remain comfort based.    Soraya is seen resting in bed awake this afternoon.  I talk with her and she mumbles some words back to me in a song like fashion.  Unable to understand any of her words at this time unfortunately.  She does allow an exam.  I note she looks comfortable and that her nails are nicely painted.    Past Medical History:   Diagnosis Date     Dementia (H)      Hyperlipidemia LDL goal <130 08/13/2012     Hypertension         CODE STATUS: DNR/DNI/DNH    ALLERGIES: Patient has no known allergies.    MEDICATIONS: Reviewed and updated in [facility] according to facility MAR  Current Outpatient Medications   Medication Sig Dispense Refill     acetaminophen (TYLENOL) 500 MG tablet Take 1,000 mg by mouth 3 times daily       cyanocobalamin (VITAMIN B-12) 100 MCG tablet Take 100 mcg by mouth daily       divalproex sodium delayed-release (DEPAKOTE) 250 MG DR tablet Take 250 mg by mouth 2 times daily       escitalopram (LEXAPRO) 20 MG " "tablet Take 20 mg by mouth At Bedtime       mirtazapine (REMERON) 15 MG tablet Take 1 tablet (15 mg) by mouth At Bedtime       OLANZapine (ZYPREXA) 2.5 MG tablet Take 2.5 mg by mouth 2 times daily @ 11 AM and 5 PM       SENNA-docusate sodium (SENNA S) 8.6-50 MG tablet Take 1 tablet by mouth 2 times daily as needed (constipation)       Vitamin D, Cholecalciferol, 25 MCG (1000 UT) CAPS Take 1,000 Units by mouth daily         ROS:  Unobtainable secondary to cognitive impairment.     Exam:  /78   Pulse 67   Temp 97.5  F (36.4  C)   Resp 18   Ht 1.651 m (5' 5\")   Wt 61.5 kg (135 lb 8 oz)   SpO2 96%   BMI 22.55 kg/m    Awake, casually dressed, lying in bed, good hygiene with well painted nails, no odor  Moist oral mucosa  Heart tones regular without murmurs rubs or gallops  Breathing nonlabored on room air, no cough  Abdomen soft, nontender, no guarding  No lower extremity edema  Does not make direct eye contact but seems to appreciate that she has a visitor and mumbles some words in a song like fashion    Lab/Diagnostic Data:    Oct 2023 Cr 0.46 ordered in preparation of influenza season; otherwise no routine phlebotomy on comfort based approach to care     ASSESSMENT/PLAN:  Severe late onset Alzheimer's dementia with mood disturbance (H)  History of anxiety  Weight loss  Dysphagia, unspecified type  Goals of care remain comfort based for Soraya  Does benefit from the support of loving family and staff  She graduated hospice due to stability back in November 2022 but at further decline would be hospice candidate again  She is slowly losing weight approximately 10 pounds over the past year but otherwise stable  Continue on puréed diet as tolerated  Not overly sedated on meds; h/o significant anxiety so meds may be a comfort for her  Is in her room today but does also spend a fair amount of time out in common area  Call to her daughter, Gracie, left brief VM that she is welcome to call back if she/family has " questions/concerns about Soraya's care      Electronically signed by:  Lurdes Sinclair, DO      Sincerely,        Lurdes Sinclair, DO

## 2024-07-10 NOTE — PROGRESS NOTES
"[place] GERIATRIC SERVICES  PHYSICIAN NOTE    Chief Complaint   Patient presents with    [facility] Regulatory       HPI:    Shari Graham is a 73 year old  (1950), who is being seen today for a federally mandated E/M visit at [facility]. She admitted to LTC in Aug 2020 with progressive dementia and associated significant anxiety. She was receiving hospice cares Jan 2022-Nov 2022 but ultimately \"graduated\" due to stabilization at that time. Goals of care remain comfort based.     Recent vitals: Afebrile, blood pressure 110-140/60-70, heart rate 60-80.  Weight has slowly drifted down about 10 pounds over the past year from approximately 140s pounds down to approximately 130s pounds.    I spoke with nursing staff and she is stable.  No acute concerns.  Continues on a puréed diet with staff assistance.  They do note the weight loss as well.  Goals of care remain comfort based.    Soraya is seen resting in bed awake this afternoon.  I talk with her and she mumbles some words back to me in a song like fashion.  Unable to understand any of her words at this time unfortunately.  She does allow an exam.  I note she looks comfortable and that her nails are nicely painted.    Past Medical History:   Diagnosis Date    Dementia (H)     Hyperlipidemia LDL goal <130 08/13/2012    Hypertension         CODE STATUS: DNR/DNI/DNH    ALLERGIES: Patient has no known allergies.    MEDICATIONS: Reviewed and updated in Psychiatric according to facility MAR  Current Outpatient Medications   Medication Sig Dispense Refill    acetaminophen (TYLENOL) 500 MG tablet Take 1,000 mg by mouth 3 times daily      cyanocobalamin (VITAMIN B-12) 100 MCG tablet Take 100 mcg by mouth daily      divalproex sodium delayed-release (DEPAKOTE) 250 MG DR tablet Take 250 mg by mouth 2 times daily      escitalopram (LEXAPRO) 20 MG tablet Take 20 mg by mouth At Bedtime      mirtazapine (REMERON) 15 MG tablet Take 1 tablet (15 mg) by mouth At " "Bedtime      OLANZapine (ZYPREXA) 2.5 MG tablet Take 2.5 mg by mouth 2 times daily @ 11 AM and 5 PM      SENNA-docusate sodium (SENNA S) 8.6-50 MG tablet Take 1 tablet by mouth 2 times daily as needed (constipation)      Vitamin D, Cholecalciferol, 25 MCG (1000 UT) CAPS Take 1,000 Units by mouth daily         ROS:  Unobtainable secondary to cognitive impairment.     Exam:  /78   Pulse 67   Temp 97.5  F (36.4  C)   Resp 18   Ht 1.651 m (5' 5\")   Wt 61.5 kg (135 lb 8 oz)   SpO2 96%   BMI 22.55 kg/m    Awake, casually dressed, lying in bed, good hygiene with well painted nails, no odor  Moist oral mucosa  Heart tones regular without murmurs rubs or gallops  Breathing nonlabored on room air, no cough  Abdomen soft, nontender, no guarding  No lower extremity edema  Does not make direct eye contact but seems to appreciate that she has a visitor and mumbles some words in a song like fashion    Lab/Diagnostic Data:    Oct 2023 Cr 0.46 ordered in preparation of influenza season; otherwise no routine phlebotomy on comfort based approach to care     ASSESSMENT/PLAN:  Severe late onset Alzheimer's dementia with mood disturbance (H)  History of anxiety  Weight loss  Dysphagia, unspecified type  Goals of care remain comfort based for Soraya  Does benefit from the support of loving family and staff  She graduated hospice due to stability back in November 2022 but at further decline would be hospice candidate again  She is slowly losing weight approximately 10 pounds over the past year but otherwise stable  Continue on puréed diet as tolerated  Not overly sedated on meds; h/o significant anxiety so meds may be a comfort for her  Is in her room today but does also spend a fair amount of time out in common area  Call to her daughter, Gracie, left brief VM that she is welcome to call back if she/family has questions/concerns about Soraya's care      Electronically signed by:  Lurdes Sinclair DO  "

## 2024-09-03 ENCOUNTER — PATIENT OUTREACH (OUTPATIENT)
Dept: GERIATRIC MEDICINE | Facility: CLINIC | Age: 74
End: 2024-09-03
Payer: COMMERCIAL

## 2024-09-03 NOTE — PROGRESS NOTES
[facility] Care Coordination Contact    Internal CC change effective 9/1/2204.  Mailed member CC Change letter.  Additional tasks to be completed by CMS include: update database & EPIC, and move member file.    Marya Gill  Case Management Specialist  [facility]  [phone]

## 2024-09-03 NOTE — LETTER
September 3, 2024    SHARI CHICAS  C/O NEREYDA CHICAS  [address] [address]      Dear Shari:    As a member of [facility] [place] [facility] you are provided a care coordinator. I will be your new care coordinator as of 9/1/2024. I will be calling you soon to see how you are doing and determine your needs.    If you have any questions, please feel free to call me at [phone]. If you reach my voice mail, please leave a message and your phone number. If you are hearing impaired, please call the [place] Relay at 244 or [phone] (zzabsc-ud-pzrwah relay service).    I look forward to speaking with you soon.    Sincerely,        GRISELDA Walker, [facility]    E-mail: [email or web address]  Phone: [phone]      [facility]          [facility]  [id number] DHS Approved ([id number])  [id number] (11/18)

## 2024-09-05 ENCOUNTER — NURSING HOME VISIT (OUTPATIENT)
Dept: GERIATRICS | Facility: CLINIC | Age: 74
End: 2024-09-05
Payer: COMMERCIAL

## 2024-09-05 VITALS
HEART RATE: 72 BPM | RESPIRATION RATE: 18 BRPM | SYSTOLIC BLOOD PRESSURE: 123 MMHG | DIASTOLIC BLOOD PRESSURE: 70 MMHG | WEIGHT: 132 LBS | BODY MASS INDEX: 21.97 KG/M2 | OXYGEN SATURATION: 95 % | TEMPERATURE: 98.6 F

## 2024-09-05 DIAGNOSIS — G30.1 SEVERE LATE ONSET ALZHEIMER'S DEMENTIA WITH MOOD DISTURBANCE (H): Primary | ICD-10-CM

## 2024-09-05 DIAGNOSIS — K59.00 CONSTIPATION, UNSPECIFIED CONSTIPATION TYPE: ICD-10-CM

## 2024-09-05 DIAGNOSIS — F02.C3 SEVERE LATE ONSET ALZHEIMER'S DEMENTIA WITH MOOD DISTURBANCE (H): Primary | ICD-10-CM

## 2024-09-05 DIAGNOSIS — Z86.59 HISTORY OF ANXIETY: ICD-10-CM

## 2024-09-05 PROCEDURE — 99309 SBSQ NF CARE MODERATE MDM 30: CPT | Performed by: NURSE PRACTITIONER

## 2024-09-05 NOTE — PROGRESS NOTES
[facility] GERIATRICS  Chief Complaint   Patient presents with    long term Regulatory     [place] Medical Record Number:  [id number]  Place of Service where encounter took place:  [facility] () [[id number]]    HPI:    Shari Graham  is 74 year old (1950), who is being seen today for a federally mandated E/M visit.     Background:    This is a 74-year-old female, with a past medical history significant for Alzheimer's dementia, hypertension and hyperlipidemia, who was last hospitalized at [facility] 8/22/20 through 8/24/20 for increased agitation and confusion. Felt to be secondary to progressing dementia and transferred to [facility] long term [facility].      Enrolled in [facility] 1/24/22 and graduated 11/7/22. Re-evaluated by [facility] per family request 9/27/23 and did not qualify due to stability. Goal remains comfort.    Today's concerns are:    Alzheimer's Dementia, Anxiety and Delusions. Unable to make needs known. Unable to complete BIMS due to advanced dementia. Upon review of weights, 143.2 lbs 1/5/24 -> 136.5 lbs 5/10/24 -> 132 lbs 8/12/24.      Hypertension and Hyperlipidemia. Upon review of blood pressures over the past month, systolic range from 123-135. Diastolic range from 65-89.     Constipation. Upon review of bowel movements over the past 5 days, has had 3 bowel movements.    ALLERGIES:Patient has no known allergies.  PAST MEDICAL HISTORY:   Past Medical History:   Diagnosis Date    Dementia (H)     Hyperlipidemia LDL goal <130 08/13/2012    Hypertension      PAST SURGICAL HISTORY:   has a past surgical history that includes surgical history of -  (grade school); hysterectomy, pap no longer indicated (1995); surgical history of -  (2011); colonoscopy; and appendectomy.  FAMILY HISTORY: family history includes Alzheimer Disease in her paternal grandmother; Breast Cancer in her sister and sister; Cancer in her father; Diabetes in her  maternal grandmother and mother; Heart Disease in her mother; Hypertension in her mother; Prostate Cancer in her brother, brother, father, and paternal grandfather; Respiratory in her father.  SOCIAL HISTORY:  reports that she quit smoking about 43 years ago. Her smoking use included cigarettes. She started smoking about 54 years ago. She has a 5.5 pack-year smoking history. She has never used smokeless tobacco. She reports current alcohol use. She reports that she does not use drugs.    MEDICATIONS:     Review of your medicines            Accurate as of September 5, 2024 11:59 PM. If you have any questions, ask your nurse or doctor.                CONTINUE these medicines which have NOT CHANGED        Dose / Directions   acetaminophen 500 MG tablet  Commonly known as: TYLENOL      Dose: 1,000 mg  Take 1,000 mg by mouth 3 times daily  Refills: 0     cyanocobalamin 100 MCG tablet  Commonly known as: VITAMIN B-12      Dose: 100 mcg  Take 100 mcg by mouth daily  Refills: 0     divalproex sodium delayed-release 250 MG DR tablet  Commonly known as: DEPAKOTE      Dose: 250 mg  Take 250 mg by mouth 2 times daily  Refills: 0     escitalopram 20 MG tablet  Commonly known as: LEXAPRO      Dose: 20 mg  Take 20 mg by mouth At Bedtime  Refills: 0     mirtazapine 15 MG tablet  Commonly known as: REMERON  Used for: Late onset Alzheimer's disease with behavioral disturbance (H), Anxiety      Dose: 15 mg  Take 1 tablet (15 mg) by mouth At Bedtime  Quantity:    Refills: 0     OLANZapine 2.5 MG tablet  Commonly known as: zyPREXA      Dose: 2.5 mg  Take 2.5 mg by mouth 2 times daily @ 11 AM and 5 PM  Refills: 0     SENNA-docusate sodium 8.6-50 MG tablet  Commonly known as: SENNA S  Used for: Constipation, unspecified constipation type      Dose: 1 tablet  Take 1 tablet by mouth 2 times daily as needed (constipation)  Refills: 0     Vitamin D (Cholecalciferol) 25 MCG (1000 UT) Caps      Dose: 1,000 Units  Take 1,000 Units by mouth  daily  Refills: 0             Case Management:  I have reviewed the care plan and MDS and do agree with the plan. Patient's desire to return to the community is not assessible due to cognitive impairment. Information reviewed:  Medications, vital signs, orders, and nursing notes.    ROS:  Unobtainable secondary to cognitive impairment.     Vitals:  /70   Pulse 72   Temp 98.6  F (37  C)   Resp 18   Wt 59.9 kg (132 lb)   SpO2 95%   BMI 21.97 kg/m    Body mass index is 21.97 kg/m .  Exam:  GENERAL APPEARANCE:  in no distress  RESP:  no respiratory distress  PSYCH:  memory impaired     Lab/Diagnostic data:   Labs done in SNF are in [place] [employer]. Please refer to them using [employer] Everywhere.    ASSESSMENT/PLAN  Alzheimer's Dementia, Anxiety and Delusions. Chronic, progressive. Nonsensical verbalizations at times. Weight trending down from the beginning of the year. Resides on secure memory care unit. Continue Divalproex, Escitalopram, Mirtazapine, and Olanzapine as ordered. Graduated from [facility] on 11/7/22. Daughter elects to continue with comfort care and will not attempt GDR per her request.    Dysphagia. On a pureed diet.     Hypertension and Hyperlipidemia. Blood pressures < 140/90. Requires no medications.      Constipation. Continue Senna-S PRN as ordered.    Orders:  None    Electronically signed by:  AARON Funez CNP

## 2024-09-05 NOTE — LETTER
9/5/2024      Shari Graham  C/o Kris Graham  [address] [address]        No notes on file      Sincerely,        AARON Funez CNP

## 2024-11-20 ENCOUNTER — NURSING HOME VISIT (OUTPATIENT)
Dept: GERIATRICS | Facility: CLINIC | Age: 74
End: 2024-11-20
Payer: COMMERCIAL

## 2024-11-20 VITALS
WEIGHT: 132 LBS | DIASTOLIC BLOOD PRESSURE: 85 MMHG | HEIGHT: 62 IN | BODY MASS INDEX: 24.29 KG/M2 | RESPIRATION RATE: 18 BRPM | SYSTOLIC BLOOD PRESSURE: 137 MMHG | HEART RATE: 69 BPM | TEMPERATURE: 97.3 F | OXYGEN SATURATION: 98 %

## 2024-11-20 DIAGNOSIS — Z51.5 HOSPICE CARE PATIENT: Primary | ICD-10-CM

## 2024-11-20 DIAGNOSIS — R63.4 WEIGHT LOSS: ICD-10-CM

## 2024-11-20 DIAGNOSIS — R13.10 DYSPHAGIA, UNSPECIFIED TYPE: ICD-10-CM

## 2024-11-20 DIAGNOSIS — F02.C3 SEVERE LATE ONSET ALZHEIMER'S DEMENTIA WITH MOOD DISTURBANCE (H): ICD-10-CM

## 2024-11-20 DIAGNOSIS — G30.1 SEVERE LATE ONSET ALZHEIMER'S DEMENTIA WITH MOOD DISTURBANCE (H): ICD-10-CM

## 2024-11-20 NOTE — LETTER
" 11/20/2024      Shari Graham  C/o Kris Graham  [address] [address]        [place] GERIATRIC SERVICES  PHYSICIAN NOTE    Chief Complaint   Patient presents with     intermediate Regulatory       HPI:    Shari Graham is a 74 year old  (1950), who is being seen today for a federally mandated E/M visit at [facility]. She admitted to LT in Aug 2020 with progressive dementia and associated significant anxiety. She was receiving hospice cares Jan 2022-Nov 2022 but ultimately \"graduated\" due to stabilization at that time. Goals of care remain comfort based.     Recent vitals: Afebrile, -130/50-80, HR 60-70 and weight roughly down about 5# slowly overall the past 6 months. Continues on pureed diet. Was re-enrolled to [facility] on 8/31/24 with dx of dementia with her weight loss and continuing comfort based approach to care. No new medication changes.    Per staff she is stable.  She is resting comfortably in a Broda chair today and nicely dressed.  She does not open her eyes to talk with me although she mumbles something to me initially when I approach her.    Past Medical History:   Diagnosis Date     Dementia (H)      Hyperlipidemia LDL goal <130 08/13/2012     Hypertension         CODE STATUS: DNR/I - hospice    ALLERGIES: Patient has no known allergies.    MEDICATIONS: Reviewed and updated in [facility] according to facility MAR  Current Outpatient Medications   Medication Sig Dispense Refill     acetaminophen (TYLENOL) 500 MG tablet Take 1,000 mg by mouth 3 times daily       cyanocobalamin (VITAMIN B-12) 100 MCG tablet Take 100 mcg by mouth daily       divalproex sodium delayed-release (DEPAKOTE) 250 MG DR tablet Take 250 mg by mouth 2 times daily       escitalopram (LEXAPRO) 20 MG tablet Take 20 mg by mouth At Bedtime       mirtazapine (REMERON) 15 MG tablet Take 1 tablet (15 mg) by mouth At Bedtime       OLANZapine (ZYPREXA) 2.5 MG tablet Take 2.5 mg by " "mouth 2 times daily @ 11 AM and 5 PM       SENNA-docusate sodium (SENNA S) 8.6-50 MG tablet Take 1 tablet by mouth 2 times daily as needed (constipation)       Vitamin D, Cholecalciferol, 25 MCG (1000 UT) CAPS Take 1,000 Units by mouth daily         ROS:  Unobtainable secondary to cognitive impairment.     Exam:  /85   Pulse 69   Temp 97.3  F (36.3  C)   Resp 18   Ht 1.575 m (5' 2\")   Wt 59.9 kg (132 lb)   SpO2 98%   BMI 24.14 kg/m    Dozing comfortably in Broda chair, nicely groomed, wearing a pink top with matching pink nail polish, good hygiene  Breathing nonlabored on room air, no cough  Heart tones regular  Eyes are closed throughout the visit  No edema    Lab/Diagnostic Data:    Sept 2024 Cr 0.54 in preparation of influenza season; otherwise no routine phlebotomy on comfort based approach to care     ASSESSMENT/PLAN:  Hospice care patient  Severe late onset Alzheimer's dementia with mood disturbance (H)  H/o anxiety  Weight loss  Dysphagia, unspecified type   Appreciate the assistance of staff, family and now again [facility]  Continue comfort based approach to care  Has had slight weight loss over the past several months but starting to plateau again  Continues on dysphagia diet as tolerated for comfort with oral assisted feeding  Medications remain stable but adjust as needed again primarily focusing on keeping her anxiety under control  Historically she seems to have really calmed with the presence of the Broda chair which she appears content in today        Electronically signed by:  Lurdes Sinclair DO      Sincerely,        Lurdes Sinclair DO      "

## 2024-11-20 NOTE — PROGRESS NOTES
"[place] GERIATRIC SERVICES  PHYSICIAN NOTE    Chief Complaint   Patient presents with    [facility] Regulatory       HPI:    Shari Graham is a 74 year old  (1950), who is being seen today for a federally mandated E/M visit at [facility]. She admitted to LTC in Aug 2020 with progressive dementia and associated significant anxiety. She was receiving hospice cares Jan 2022-Nov 2022 but ultimately \"graduated\" due to stabilization at that time. Goals of care remain comfort based.     Recent vitals: Afebrile, -130/50-80, HR 60-70 and weight roughly down about 5# slowly overall the past 6 months. Continues on pureed diet. Was re-enrolled to [facility] on 8/31/24 with dx of dementia with her weight loss and continuing comfort based approach to care. No new medication changes.    Per staff she is stable.  She is resting comfortably in a Broda chair today and nicely dressed.  She does not open her eyes to talk with me although she mumbles something to me initially when I approach her.    Past Medical History:   Diagnosis Date    Dementia (H)     Hyperlipidemia LDL goal <130 08/13/2012    Hypertension         CODE STATUS: DNR/I - hospice    ALLERGIES: Patient has no known allergies.    MEDICATIONS: Reviewed and updated in Epic according to facility MAR  Current Outpatient Medications   Medication Sig Dispense Refill    acetaminophen (TYLENOL) 500 MG tablet Take 1,000 mg by mouth 3 times daily      cyanocobalamin (VITAMIN B-12) 100 MCG tablet Take 100 mcg by mouth daily      divalproex sodium delayed-release (DEPAKOTE) 250 MG DR tablet Take 250 mg by mouth 2 times daily      escitalopram (LEXAPRO) 20 MG tablet Take 20 mg by mouth At Bedtime      mirtazapine (REMERON) 15 MG tablet Take 1 tablet (15 mg) by mouth At Bedtime      OLANZapine (ZYPREXA) 2.5 MG tablet Take 2.5 mg by mouth 2 times daily @ 11 AM and 5 PM      SENNA-docusate sodium (SENNA S) 8.6-50 MG tablet Take 1 tablet by mouth 2 times " "daily as needed (constipation)      Vitamin D, Cholecalciferol, 25 MCG (1000 UT) CAPS Take 1,000 Units by mouth daily         ROS:  Unobtainable secondary to cognitive impairment.     Exam:  /85   Pulse 69   Temp 97.3  F (36.3  C)   Resp 18   Ht 1.575 m (5' 2\")   Wt 59.9 kg (132 lb)   SpO2 98%   BMI 24.14 kg/m    Dozing comfortably in Broda chair, nicely groomed, wearing a pink top with matching pink nail polish, good hygiene  Breathing nonlabored on room air, no cough  Heart tones regular  Eyes are closed throughout the visit  No edema    Lab/Diagnostic Data:    Sept 2024 Cr 0.54 in preparation of influenza season; otherwise no routine phlebotomy on comfort based approach to care     ASSESSMENT/PLAN:  Hospice care patient  Severe late onset Alzheimer's dementia with mood disturbance (H)  H/o anxiety  Weight loss  Dysphagia, unspecified type   Appreciate the assistance of staff, family and now again [facility]  Continue comfort based approach to care  Has had slight weight loss over the past several months but starting to plateau again  Continues on dysphagia diet as tolerated for comfort with oral assisted feeding  Medications remain stable but adjust as needed again primarily focusing on keeping her anxiety under control  Historically she seems to have really calmed with the presence of the Broda chair which she appears content in today        Electronically signed by:  Lurdes Sinclair DO  "

## 2025-01-16 ENCOUNTER — NURSING HOME VISIT (OUTPATIENT)
Dept: GERIATRICS | Facility: CLINIC | Age: 75
End: 2025-01-16
Payer: COMMERCIAL

## 2025-01-16 VITALS
BODY MASS INDEX: 24.51 KG/M2 | TEMPERATURE: 97.3 F | DIASTOLIC BLOOD PRESSURE: 85 MMHG | OXYGEN SATURATION: 98 % | RESPIRATION RATE: 18 BRPM | SYSTOLIC BLOOD PRESSURE: 134 MMHG | WEIGHT: 134 LBS | HEART RATE: 78 BPM

## 2025-01-16 DIAGNOSIS — G30.1 SEVERE LATE ONSET ALZHEIMER'S DEMENTIA WITH ANXIETY (H): Primary | ICD-10-CM

## 2025-01-16 DIAGNOSIS — Z51.5 HOSPICE CARE PATIENT: ICD-10-CM

## 2025-01-16 DIAGNOSIS — I10 ESSENTIAL HYPERTENSION: ICD-10-CM

## 2025-01-16 DIAGNOSIS — F02.C4 SEVERE LATE ONSET ALZHEIMER'S DEMENTIA WITH ANXIETY (H): Primary | ICD-10-CM

## 2025-01-16 PROCEDURE — 99309 SBSQ NF CARE MODERATE MDM 30: CPT | Performed by: NURSE PRACTITIONER

## 2025-01-16 NOTE — PROGRESS NOTES
[facility] GERIATRICS  Chief Complaint   Patient presents with    residential Regulatory     [place] Medical Record Number:  [id number]  Place of Service where encounter took place:  [facility] () [[id number]]    HPI:    Shari Graham  is 74 year old (1950), who is being seen today for a federally mandated E/M visit. Today's concerns are:  {FGS DX:984172}    ALLERGIES:Patient has no known allergies.  PAST MEDICAL HISTORY:   Past Medical History:   Diagnosis Date    Dementia (H)     Hyperlipidemia LDL goal <130 08/13/2012    Hypertension      PAST SURGICAL HISTORY:   has a past surgical history that includes surgical history of -  (grade school); hysterectomy, pap no longer indicated (1995); surgical history of -  (2011); colonoscopy; and appendectomy.  FAMILY HISTORY: family history includes Alzheimer Disease in her paternal grandmother; Breast Cancer in her sister and sister; Cancer in her father; Diabetes in her maternal grandmother and mother; Heart Disease in her mother; Hypertension in her mother; Prostate Cancer in her brother, brother, father, and paternal grandfather; Respiratory in her father.  SOCIAL HISTORY:  reports that she quit smoking about 43 years ago. Her smoking use included cigarettes. She started smoking about 54 years ago. She has a 5.5 pack-year smoking history. She has never used smokeless tobacco. She reports current alcohol use. She reports that she does not use drugs.    MEDICATIONS:  MED REC REQUIRED{TIP  Click the link below to document or use med rec list, use list to pull in response :077101}  Post Medication Reconciliation Status: {MED REC LIST:391207}         Review of your medicines            Accurate as of January 16, 2025 12:30 PM. If you have any questions, ask your nurse or doctor.                CONTINUE these medicines which have NOT CHANGED        Dose / Directions   acetaminophen 500 MG tablet  Commonly known as: TYLENOL      Dose: 1,000 mg  Take  1,000 mg by mouth 3 times daily  Refills: 0     cyanocobalamin 100 MCG tablet  Commonly known as: VITAMIN B-12      Dose: 100 mcg  Take 100 mcg by mouth daily  Refills: 0     divalproex sodium delayed-release 250 MG DR tablet  Commonly known as: DEPAKOTE      Dose: 250 mg  Take 250 mg by mouth 2 times daily  Refills: 0     escitalopram 20 MG tablet  Commonly known as: LEXAPRO      Dose: 20 mg  Take 20 mg by mouth At Bedtime  Refills: 0     mirtazapine 15 MG tablet  Commonly known as: REMERON  Used for: Late onset Alzheimer's disease with behavioral disturbance (H), Anxiety      Dose: 15 mg  Take 1 tablet (15 mg) by mouth At Bedtime  Quantity:    Refills: 0     OLANZapine 2.5 MG tablet  Commonly known as: zyPREXA      Dose: 2.5 mg  Take 2.5 mg by mouth 2 times daily @ 11 AM and 5 PM  Refills: 0     SENNA-docusate sodium 8.6-50 MG tablet  Commonly known as: SENNA S  Used for: Constipation, unspecified constipation type      Dose: 1 tablet  Take 1 tablet by mouth 2 times daily as needed (constipation)  Refills: 0     Vitamin D (Cholecalciferol) 25 MCG (1000 UT) Caps      Dose: 1,000 Units  Take 1,000 Units by mouth daily  Refills: 0           ***    Case Management:  I have reviewed the care plan and MDS and do agree with the plan. Patient's desire to return to the community is {FGS RETURN TO COMMUNITY:838203}. Information reviewed:  Medications, vital signs, orders, and nursing notes.    ROS:  {ROS FGS:667857}    Vitals:  /85   Pulse 78   Temp 97.3  F (36.3  C)   Resp 18   Wt 60.8 kg (134 lb)   SpO2 98%   BMI 24.51 kg/m    Body mass index is 24.51 kg/m .  Exam:  {Nursing home physical exam :268103}    Lab/Diagnostic data:   {fgslab:469661}    ASSESSMENT/PLAN  {FGS DX2:748385}    {fgsorders:112110}  ***    Electronically signed by:  Jeremiah Berry***         is not assessible due to cognitive impairment. Information reviewed:  Medications, vital signs, orders, and nursing notes.    ROS:  Unobtainable secondary to cognitive impairment.     Vitals:  /85   Pulse 78   Temp 97.3  F (36.3  C)   Resp 18   Wt 60.8 kg (134 lb)   SpO2 98%   BMI 24.51 kg/m    Body mass index is 24.51 kg/m .  Exam:  GENERAL APPEARANCE:  Alert, in no distress  RESP:  no respiratory distress  PSYCH:  memory impaired     Lab/Diagnostic data:   Labs done in SNF are in [place] [employer]. Please refer to them using [employer] Everywhere.    ASSESSMENT/PLAN  Alzheimer's Dementia, Anxiety and Delusions on Hospice. Chronic, progressive. Nonsensical verbalizations at times. Weights 143.2 lbs 1/5/24 -> 132.7 lbs 12/13/24. Resides on secure memory care unit. Continue Divalproex, Escitalopram, Mirtazapine, and Olanzapine as ordered. Graduated from [employer] on 11/7/22 and re-enrolled 8/31/24 due to continued weight loss.      Dysphagia. On a pureed diet.     Hypertension and Hyperlipidemia. Most blood pressures < 140/90. Requires no medications.      Constipation. Continue Senna-S PRN as ordered.    Orders:  None    Electronically signed by:  AARON Funez CNP

## 2025-01-16 NOTE — LETTER
1/16/2025      Shari Graham  C/o Kris Graham  [address] [address]        No notes on file      Sincerely,        AARON Funez CNP    Electronically signed   in her mother; Hypertension in her mother; Prostate Cancer in her brother, brother, father, and paternal grandfather; Respiratory in her father.  SOCIAL HISTORY:  reports that she quit smoking about 43 years ago. Her smoking use included cigarettes. She started smoking about 54 years ago. She has a 5.5 pack-year smoking history. She has never used smokeless tobacco. She reports current alcohol use. She reports that she does not use drugs.    MEDICATIONS:     Review of your medicines            Accurate as of January 16, 2025 11:59 PM. If you have any questions, ask your nurse or doctor.                CONTINUE these medicines which have NOT CHANGED        Dose / Directions   acetaminophen 500 MG tablet  Commonly known as: TYLENOL      Dose: 1,000 mg  Take 1,000 mg by mouth 3 times daily  Refills: 0     cyanocobalamin 100 MCG tablet  Commonly known as: VITAMIN B-12      Dose: 100 mcg  Take 100 mcg by mouth daily  Refills: 0     divalproex sodium delayed-release 250 MG DR tablet  Commonly known as: DEPAKOTE      Dose: 250 mg  Take 250 mg by mouth 2 times daily  Refills: 0     escitalopram 20 MG tablet  Commonly known as: LEXAPRO      Dose: 20 mg  Take 20 mg by mouth At Bedtime  Refills: 0     mirtazapine 15 MG tablet  Commonly known as: REMERON  Used for: Late onset Alzheimer's disease with behavioral disturbance (H), Anxiety      Dose: 15 mg  Take 1 tablet (15 mg) by mouth At Bedtime  Quantity:    Refills: 0     OLANZapine 2.5 MG tablet  Commonly known as: zyPREXA      Dose: 2.5 mg  Take 2.5 mg by mouth 2 times daily @ 11 AM and 5 PM  Refills: 0     SENNA-docusate sodium 8.6-50 MG tablet  Commonly known as: SENNA S  Used for: Constipation, unspecified constipation type      Dose: 1 tablet  Take 1 tablet by mouth 2 times daily as needed (constipation)  Refills: 0     Vitamin D (Cholecalciferol) 25 MCG (1000 UT) Caps      Dose: 1,000 Units  Take 1,000 Units by mouth daily  Refills: 0             Case Management:  I  have reviewed the care plan and MDS and do agree with the plan. Patient's desire to return to the community is not assessible due to cognitive impairment. Information reviewed:  Medications, vital signs, orders, and nursing notes.    ROS:  Unobtainable secondary to cognitive impairment.     Vitals:  /85   Pulse 78   Temp 97.3  F (36.3  C)   Resp 18   Wt 60.8 kg (134 lb)   SpO2 98%   BMI 24.51 kg/m    Body mass index is 24.51 kg/m .  Exam:  GENERAL APPEARANCE:  Alert, in no distress  RESP:  no respiratory distress  PSYCH:  memory impaired     Lab/Diagnostic data:   Labs done in SNF are in [place] [employer]. Please refer to them using [employer] Everywhere.    ASSESSMENT/PLAN  Alzheimer's Dementia, Anxiety and Delusions on Hospice. Chronic, progressive. Nonsensical verbalizations at times. Weights 143.2 lbs 1/5/24 -> 132.7 lbs 12/13/24. Resides on secure memory care unit. Continue Divalproex, Escitalopram, Mirtazapine, and Olanzapine as ordered. Graduated from [employer] on 11/7/22 and re-enrolled 8/31/24 due to continued weight loss.      Dysphagia. On a pureed diet.     Hypertension and Hyperlipidemia. Most blood pressures < 140/90. Requires no medications.      Constipation. Continue Senna-S PRN as ordered.    Orders:  None    Electronically signed by:  AARON Funez CNP          Sincerely,        AARON Funez CNP    Electronically signed

## 2025-03-21 ENCOUNTER — NURSING HOME VISIT (OUTPATIENT)
Dept: GERIATRICS | Facility: CLINIC | Age: 75
End: 2025-03-21
Payer: COMMERCIAL

## 2025-03-21 VITALS
WEIGHT: 137.2 LBS | SYSTOLIC BLOOD PRESSURE: 112 MMHG | HEIGHT: 62 IN | TEMPERATURE: 97.5 F | HEART RATE: 74 BPM | RESPIRATION RATE: 18 BRPM | BODY MASS INDEX: 25.25 KG/M2 | DIASTOLIC BLOOD PRESSURE: 65 MMHG | OXYGEN SATURATION: 95 %

## 2025-03-21 DIAGNOSIS — R13.10 DYSPHAGIA, UNSPECIFIED TYPE: ICD-10-CM

## 2025-03-21 DIAGNOSIS — Z51.5 HOSPICE CARE PATIENT: Primary | ICD-10-CM

## 2025-03-21 DIAGNOSIS — G30.1 SEVERE LATE ONSET ALZHEIMER'S DEMENTIA WITH MOOD DISTURBANCE (H): ICD-10-CM

## 2025-03-21 DIAGNOSIS — Z86.59 HISTORY OF ANXIETY: ICD-10-CM

## 2025-03-21 DIAGNOSIS — F02.C3 SEVERE LATE ONSET ALZHEIMER'S DEMENTIA WITH MOOD DISTURBANCE (H): ICD-10-CM

## 2025-03-21 PROCEDURE — 99309 SBSQ NF CARE MODERATE MDM 30: CPT | Performed by: INTERNAL MEDICINE

## 2025-03-21 NOTE — LETTER
" 3/21/2025      Shari Graham  C/o Kris Graham  [address] [facility] [address]        [place] [facility]  PHYSICIAN NOTE    Chief Complaint   Patient presents with     penitentiary Regulatory       HPI:    Shari Graham is a 74 year old  (1950), who is being seen today for a federally mandated E/M visit at [facility].    BACKGROUND: She admitted to LT in Aug 2020 with progressive dementia and associated significant anxiety. She was receiving hospice cares Jan 2022-Nov 2022 but ultimately \"graduated\" due to stabilization at that time. Goals of care remained comfort based. Was re-enrolled to [facility] on 8/31/24 with dx of dementia with weight loss and continuing comfort based approach to care.     Recent vitals: Afebrile, -130/60-70, HR 60-70, weight stable in the 130s but up a couple of pounds in this range.    Continues to be followed onsite by hospice and remains on the memory care unit.  About a week and a half ago hospice started a trial of a GDR of a few of her medications including a slightly lower dose of both Depakote and Lexapro.  Since this change, nursing staff note that she is a little bit more alert.  Does not seem though thankfully in any distress.    Soraya is seen today as she is finishing lunch with nursing staff support.  Able to swallow the dysphagia diet well without coughing.  Per staff she is eating a little bit better and is a little bit more alert since hospice reduce some of her medications earlier this month.  Indeed during my visit, she opens her eyes to my voice though mumbles in response.  Seems comfortable.  No acute nursing concerns. Often up in broda chair in AM then goes back to her room for nap/rest in PM and get up for dinner.     Past Medical History:   Diagnosis Date     Dementia (H)      Hyperlipidemia LDL goal <130 08/13/2012     Hypertension         CODE STATUS: DNR/I Hospice    ALLERGIES: Patient has no known " "allergies.    MEDICATIONS: Reviewed and updated in Epic according to facility MAR  Current Outpatient Medications   Medication Sig Dispense Refill     acetaminophen (TYLENOL) 500 MG tablet Take 1,000 mg by mouth 3 times daily       cyanocobalamin (VITAMIN B-12) 100 MCG tablet Take 100 mcg by mouth daily       divalproex sodium delayed-release (DEPAKOTE) 125 MG DR tablet Take 125 mg by mouth 2 times daily.       escitalopram (LEXAPRO) 10 MG tablet Take 10 mg by mouth at bedtime.       mirtazapine (REMERON) 15 MG tablet Take 1 tablet (15 mg) by mouth At Bedtime       OLANZapine (ZYPREXA) 2.5 MG tablet Take 2.5 mg by mouth 2 times daily @ 11 AM and 5 PM       SENNA-docusate sodium (SENNA S) 8.6-50 MG tablet Take 1 tablet by mouth 2 times daily as needed (constipation)       Vitamin D, Cholecalciferol, 25 MCG (1000 UT) CAPS Take 1,000 Units by mouth daily         ROS:  Unobtainable secondary to cognitive impairment.     Exam:  /65   Pulse 74   Temp 97.5  F (36.4  C)   Resp 18   Ht 1.575 m (5' 2\")   Wt 62.2 kg (137 lb 3.2 oz)   SpO2 95%   BMI 25.09 kg/m    Sitting up in Broda chair, casually dressed, good hygiene  Opens eyes to voice and mumbles in response  Seems calm and content  Able to swallow dysphagia diet without cough  Breathing nonlabored    Lab/Diagnostic Data:    Sept 2024 Cr 0.54 in preparation of influenza season; otherwise no routine phlebotomy on comfort based approach to care     ASSESSMENT/PLAN:  Hospice care patient  Severe late onset Alzheimer's dementia with mood disturbance (H)  History of anxiety  Dysphagia, unspecified type  Continue the wonderful support of facility staff and family  Continue also nursing support with hospice services  Recently had a GDR of both Depakote and Lexapro and she is more alert but thankfully not seeming uncomfortable or showing signs of increased anxiety as she has been prone to in the past  Follow over the next several weeks to see how these medication " changes take full effect and adjust accordingly  Overall goals of care are comfort based  Continue staff assisted eating and monitor for potential aspiration  Weight is up a couple of pounds recently as well      Electronically signed by:  Lurdes Sinclair DO      Sincerely,        Lurdes Sinclair, DO    Electronically signed

## 2025-03-21 NOTE — PROGRESS NOTES
"[place] [facility]  PHYSICIAN NOTE    Chief Complaint   Patient presents with    [facility] Regulatory       HPI:    Shari Graham is a 74 year old  (1950), who is being seen today for a federally mandated E/M visit at [facility].    BACKGROUND: She admitted to LTC in Aug 2020 with progressive dementia and associated significant anxiety. She was receiving hospice cares Jan 2022-Nov 2022 but ultimately \"graduated\" due to stabilization at that time. Goals of care remained comfort based. Was re-enrolled to [facility] on 8/31/24 with dx of dementia with weight loss and continuing comfort based approach to care.     Recent vitals: Afebrile, -130/60-70, HR 60-70, weight stable in the 130s but up a couple of pounds in this range.    Continues to be followed onsite by hospice and remains on the memory care unit.  About a week and a half ago hospice started a trial of a GDR of a few of her medications including a slightly lower dose of both Depakote and Lexapro.  Since this change, nursing staff note that she is a little bit more alert.  Does not seem though thankfully in any distress.    Soraya is seen today as she is finishing lunch with nursing staff support.  Able to swallow the dysphagia diet well without coughing.  Per staff she is eating a little bit better and is a little bit more alert since hospice reduce some of her medications earlier this month.  Indeed during my visit, she opens her eyes to my voice though mumbles in response.  Seems comfortable.  No acute nursing concerns. Often up in broda chair in AM then goes back to her room for nap/rest in PM and get up for dinner.     Past Medical History:   Diagnosis Date    Dementia (H)     Hyperlipidemia LDL goal <130 08/13/2012    Hypertension         CODE STATUS: DNR/I Hospice    ALLERGIES: Patient has no known allergies.    MEDICATIONS: Reviewed and updated in Epic according to facility MAR  Current Outpatient Medications " "  Medication Sig Dispense Refill    acetaminophen (TYLENOL) 500 MG tablet Take 1,000 mg by mouth 3 times daily      cyanocobalamin (VITAMIN B-12) 100 MCG tablet Take 100 mcg by mouth daily      divalproex sodium delayed-release (DEPAKOTE) 125 MG DR tablet Take 125 mg by mouth 2 times daily.      escitalopram (LEXAPRO) 10 MG tablet Take 10 mg by mouth at bedtime.      mirtazapine (REMERON) 15 MG tablet Take 1 tablet (15 mg) by mouth At Bedtime      OLANZapine (ZYPREXA) 2.5 MG tablet Take 2.5 mg by mouth 2 times daily @ 11 AM and 5 PM      SENNA-docusate sodium (SENNA S) 8.6-50 MG tablet Take 1 tablet by mouth 2 times daily as needed (constipation)      Vitamin D, Cholecalciferol, 25 MCG (1000 UT) CAPS Take 1,000 Units by mouth daily         ROS:  Unobtainable secondary to cognitive impairment.     Exam:  /65   Pulse 74   Temp 97.5  F (36.4  C)   Resp 18   Ht 1.575 m (5' 2\")   Wt 62.2 kg (137 lb 3.2 oz)   SpO2 95%   BMI 25.09 kg/m    Sitting up in Broda chair, casually dressed, good hygiene  Opens eyes to voice and mumbles in response  Seems calm and content  Able to swallow dysphagia diet without cough  Breathing nonlabored    Lab/Diagnostic Data:    Sept 2024 Cr 0.54 in preparation of influenza season; otherwise no routine phlebotomy on comfort based approach to care     ASSESSMENT/PLAN:  Hospice care patient  Severe late onset Alzheimer's dementia with mood disturbance (H)  History of anxiety  Dysphagia, unspecified type  Continue the wonderful support of facility staff and family  Continue also nursing support with hospice services  Recently had a GDR of both Depakote and Lexapro and she is more alert but thankfully not seeming uncomfortable or showing signs of increased anxiety as she has been prone to in the past  Follow over the next several weeks to see how these medication changes take full effect and adjust accordingly  Overall goals of care are comfort based  Continue staff assisted eating and " monitor for potential aspiration  Weight is up a couple of pounds recently as well      Electronically signed by:  Lurdes Sinclair, DO

## 2025-04-03 DIAGNOSIS — G30.1 SEVERE LATE ONSET ALZHEIMER'S DEMENTIA WITH MOOD DISTURBANCE (H): Primary | ICD-10-CM

## 2025-04-03 DIAGNOSIS — F02.C3 SEVERE LATE ONSET ALZHEIMER'S DEMENTIA WITH MOOD DISTURBANCE (H): Primary | ICD-10-CM

## 2025-04-07 RX ORDER — OLANZAPINE 2.5 MG/1
2.5 TABLET, FILM COATED ORAL 2 TIMES DAILY
Qty: 60 TABLET | Refills: 3 | Status: SHIPPED | OUTPATIENT
Start: 2025-04-07

## 2025-04-11 ENCOUNTER — PATIENT OUTREACH (OUTPATIENT)
Dept: GERIATRIC MEDICINE | Facility: CLINIC | Age: 75
End: 2025-04-11
Payer: COMMERCIAL

## 2025-04-11 NOTE — Clinical Note
Zaki Durán, had a nice visit with Soraya with her daughter Gracie last week.  Soraya was awake for our visit which Gracie was happy about.  She seemed comfortable.  Thanks for all you do, I know Gracie really appreciates you!  GRISELDA Walker, [facility] [phone]

## 2025-04-14 ASSESSMENT — LIFESTYLE VARIABLES
AUDIT-C TOTAL SCORE: -1
SKIP TO QUESTIONS 9-10: 0

## 2025-04-14 NOTE — PROGRESS NOTES
[facility] Care Coordination Contact  [facility] Institutional Assessment     Institutional Assessment for Health Risk Assessment with Shari Graham completed on April 11, 2025 at [facility]    Type of residence:: Nursing home  Current living arrangement:: I live in a nursing home     Assessment completed with:: Patient, Care Team Member, Spouse or significant other    Mental/Behavioral Health   Depression Screening: Unable to complete, per daughter Gracie has been stable.  They recently decreased psych meds, will increase if needed but has been stable.     Mental health DX:: Yes (psychosis)   Mental health DX how managed:: Medication    Falls Assessment:   Fallen 2 or more times in the past year?: No   Any fall with injury in the past year?: No    ADL/IADL Dependencies:   Dependent ADLs:: Bathing, Dressing, Eating, Grooming, Incontinence, Positioning, Transfers, Wheelchair-with assist, Toileting  Dependent IADLs:: Cleaning, Cooking, Laundry, Shopping, Meal Preparation, Medication Management, Money Management, Transportation, Incontinence    Care Plan & Recommendations: CC met with Soraya and her daughter Gracie Graham in her room at [facility] on this date.  Soraya was awake and in bed, made some garbled sounds but appeared comfortable.  Per Gracie she has been very stable, continues on hospice which she has been on and off for years.  Gracie remarked she has not had any skin breakdown her entire nursing home stay which Gracie says speaks to the good care she is getting.  No concerns shared by Gracie on this date.  Discussed options/opportunities for transitions.    See Institutional Care Plan for detailed assessment information.    Obtained a copy of the facility care plan and MDS from facility electronic records. Requested of half-way social worker to put this care coordinator on care conference attendee list.    Placed the Health Plan facility face sheet in the member's  facility chart.    Follow-Up Plan: Member informed of future contact, plan to f/u with member with a 6 month assessment, attend 1 care conference annually, and will follow any hospitalizations or transitions. Care Coordinator contact information shared with member/family and facility, and encouraged to call this care coordinator with any questions or concerns at any time.     [place] care continuum providers: Please see Snapshot and Care Management Flowsheets for Specific details of care plan.    This CC note routed to PCP, Leeanna Martinez.    GRISELDA Walker, [facility]  [phone]

## 2025-04-28 ENCOUNTER — PATIENT OUTREACH (OUTPATIENT)
Dept: GERIATRIC MEDICINE | Facility: CLINIC | Age: 75
End: 2025-04-28
Payer: COMMERCIAL

## 2025-04-28 NOTE — PROGRESS NOTES
[facility] Care Coordination Contact    Internal CC change effective 5/1/2025.  Mailed member CC Change letter.  Additional tasks to be completed by CMS include: update database & EPIC and move member file.    Nathalie Uribe  Care Management Specialist   [facility]   [phone]

## 2025-04-28 NOTE — LETTER
April 28, 2025    SHARI CHICAS  C/O MARILYNON JUAN FRANCISCO  [address] [place] [address]      Dear Shari:    As a member of [facility] ([facility] you are provided a care coordinator. I will be your new care coordinator as of 5/1/2025. I will be calling you soon to see how you are doing and determine your needs.    If you have any questions, please feel free to call me at [phone]. If you reach my voice mail, please leave a message and your phone number. If you are hearing impaired, please call the [place] Relay at 254 or [phone] (dwebsi-bo-xlocmh relay service).    I look forward to speaking with you soon.    Sincerely,        Suyapa Rodriguez RN    Phone: [phone]   E-mail: [email or web address]          [facility]  [id number] DHS Approved ([id number])  [id number] (11/18)

## 2025-05-06 ENCOUNTER — PATIENT OUTREACH (OUTPATIENT)
Dept: GERIATRIC MEDICINE | Facility: CLINIC | Age: 75
End: 2025-05-06
Payer: COMMERCIAL

## 2025-05-06 NOTE — LETTER
May 6, 2025       SHARI CHICAS  C/O NEREYDA CHICAS  [address] [facility] [address]      Dear Shari,    At [facility], we re dedicated to improving your health and wellness. Enclosed is the Support Plan developed with you on 4/11/2025. Please review the Support Plan carefully.    As a reminder, during your visit we talked about:   Ways to manage your physical and mental health   Using health care to maintain and improve your health    Your preventive care needs      Remember to contact your care coordinator if you:   Are hospitalized or plan to be hospitalized    Have a fall     Have a change in your physical or mental health   Need help finding support or services    If you have questions or don t agree with your Support Plan, call me at [phone]. You can also call me if your needs change. TTY users call the [place] Relay at 839 or [phone] (bmbinp-wz-lxwynl relay service).    Sincerely,       GRISELDA Walker, [facility]    E-mail: [email or web address]  Phone: [phone]      [facility]                [id number] accepted     (06/2024)                [address] [place], [place] [address] [phone]  fax [phone]  [facility]

## 2025-05-06 NOTE — PROGRESS NOTES
[facility] Care Coordination Contact    Received after visit chart from care coordinator.  Completed following tasks: Mailed Transition of Care Member Handout and Mailed [facility] POC Letter to member/rep with Support Plan & Signature Page    Priti Dao  Case Management Specialist   [facility]  [phone]

## 2025-05-29 ENCOUNTER — NURSING HOME VISIT (OUTPATIENT)
Dept: GERIATRICS | Facility: CLINIC | Age: 75
End: 2025-05-29
Payer: COMMERCIAL

## 2025-05-29 VITALS
HEART RATE: 71 BPM | WEIGHT: 133 LBS | SYSTOLIC BLOOD PRESSURE: 124 MMHG | DIASTOLIC BLOOD PRESSURE: 62 MMHG | BODY MASS INDEX: 24.33 KG/M2 | TEMPERATURE: 97.8 F | RESPIRATION RATE: 18 BRPM | OXYGEN SATURATION: 98 %

## 2025-05-29 DIAGNOSIS — G30.1 SEVERE LATE ONSET ALZHEIMER'S DEMENTIA WITH MOOD DISTURBANCE (H): ICD-10-CM

## 2025-05-29 DIAGNOSIS — Z51.5 HOSPICE CARE PATIENT: Primary | ICD-10-CM

## 2025-05-29 DIAGNOSIS — F02.C3 SEVERE LATE ONSET ALZHEIMER'S DEMENTIA WITH MOOD DISTURBANCE (H): ICD-10-CM

## 2025-05-29 PROCEDURE — 99308 SBSQ NF CARE LOW MDM 20: CPT | Performed by: NURSE PRACTITIONER

## 2025-05-29 NOTE — LETTER
5/29/2025      Shari Graham  C/o Kris Graham  [address] [facility] [address]        No notes on file      Sincerely,        AARON Funez CNP    Electronically signed

## 2025-05-29 NOTE — PROGRESS NOTES
[facility]  Chief Complaint   Patient presents with    senior care Regulatory     [place] Medical Record Number:  [id number]  Place of Service where encounter took place:  [facility] () [[id number]]    HPI:    Shari Graham  is 74 year old (1950), who is being seen today for a federally mandated E/M visit. Today's concerns are:  {FGS DX:640334}    ALLERGIES:Patient has no known allergies.  PAST MEDICAL HISTORY:   Past Medical History:   Diagnosis Date    Dementia (H)     Hyperlipidemia LDL goal <130 08/13/2012    Hypertension      PAST SURGICAL HISTORY:   has a past surgical history that includes surgical history of -  (grade school); hysterectomy, pap no longer indicated (1995); surgical history of -  (2011); colonoscopy; and appendectomy.  FAMILY HISTORY: family history includes Alzheimer Disease in her paternal grandmother; Breast Cancer in her sister and sister; Cancer in her father; Diabetes in her maternal grandmother and mother; Heart Disease in her mother; Hypertension in her mother; Prostate Cancer in her brother, brother, father, and paternal grandfather; Respiratory in her father.  SOCIAL HISTORY:  reports that she quit smoking about 43 years ago. Her smoking use included cigarettes. She started smoking about 54 years ago. She has a 5.5 pack-year smoking history. She has never used smokeless tobacco. She reports that she does not currently use alcohol. She reports that she does not use drugs.    MEDICATIONS:  MED REC REQUIRED{TIP  Click the link below to document or use med rec list, use list to pull in response :857811}  Post Medication Reconciliation Status: {MED REC LIST:748320}         Review of your medicines            Accurate as of May 29, 2025 10:39 AM. If you have any questions, ask your nurse or doctor.                CONTINUE these medicines which have NOT CHANGED        Dose / Directions   acetaminophen 500 MG tablet  Commonly known as: TYLENOL      Dose:  1,000 mg  Take 1,000 mg by mouth 3 times daily  Refills: 0     cyanocobalamin 100 MCG tablet  Commonly known as: VITAMIN B-12      Dose: 100 mcg  Take 100 mcg by mouth daily  Refills: 0     escitalopram 10 MG tablet  Commonly known as: LEXAPRO      Dose: 10 mg  Take 10 mg by mouth at bedtime.  Refills: 0     mirtazapine 15 MG tablet  Commonly known as: REMERON  Used for: Late onset Alzheimer's disease with behavioral disturbance (H), Anxiety      Dose: 15 mg  Take 1 tablet (15 mg) by mouth At Bedtime  Quantity:    Refills: 0     OLANZapine 2.5 MG tablet  Commonly known as: zyPREXA  Used for: Severe late onset Alzheimer's dementia with mood disturbance (H)      Dose: 2.5 mg  Take 1 tablet (2.5 mg) by mouth 2 times daily. @ 11 AM and 5 PM  Quantity: 60 tablet  Refills: 3     SENNA-docusate sodium 8.6-50 MG tablet  Commonly known as: SENNA S  Used for: Constipation, unspecified constipation type      Dose: 1 tablet  Take 1 tablet by mouth 2 times daily as needed (constipation)  Refills: 0     Vitamin D (Cholecalciferol) 25 MCG (1000 UT) Caps      Dose: 1,000 Units  Take 1,000 Units by mouth daily  Refills: 0           ***    Case Management:  I have reviewed the care plan and MDS and do agree with the plan. Patient's desire to return to the community is {FGS RETURN TO COMMUNITY:950792}. Information reviewed:  Medications, vital signs, orders, and nursing notes.    ROS:  {ROS FGS:639744}    Vitals:  /62   Pulse 71   Temp 97.8  F (36.6  C)   Resp 18   Wt 60.3 kg (133 lb)   SpO2 98%   BMI 24.33 kg/m    Body mass index is 24.33 kg/m .  Exam:  {Nursing home physical exam :189334}    Lab/Diagnostic data:   {fgslab:556223}    ASSESSMENT/PLAN  {FGS DX2:309602}    {fgsorders:676932}  ***    Electronically signed by:  Jeremiah Berry***         Pulse 71   Temp 97.8  F (36.6  C)   Resp 18   Wt 60.3 kg (133 lb)   SpO2 98%   BMI 24.33 kg/m    Body mass index is 24.33 kg/m .  Exam:  GENERAL APPEARANCE:  in no distress  RESP:  no respiratory distress  PSYCH:  memory impaired     Lab/Diagnostic data:   Labs done in SNF [facility] in [facility]. Please refer to them using EPIC/Care Everywhere.    ASSESSMENT/PLAN  Alzheimer's Dementia, Anxiety and Delusions on Hospice. Nonsensical verbalizations at times. Weights 143.2 lbs 1/5/24 -> 132.7 lbs 12/13/24 -> 133 lbs 5/2/25. Resides on secure memory care unit. Divalproex discontinued 4/1/25. Continue Escitalopram, Mirtazapine, and Olanzapine as ordered. Graduated from [facility] on 11/7/22 and re-enrolled 8/31/24 due to continued weight loss.      Dysphagia. On a pureed diet.     Hypertension and Hyperlipidemia. Blood pressures < 140/90 over at least the past month. Requires no medications.      Constipation. Continue Senna-S PRN as ordered.    Orders:  None    Electronically signed by:  AARON Funez CNP

## 2025-07-16 ENCOUNTER — NURSING HOME VISIT (OUTPATIENT)
Dept: GERIATRICS | Facility: CLINIC | Age: 75
End: 2025-07-16
Payer: COMMERCIAL

## 2025-07-16 VITALS
RESPIRATION RATE: 18 BRPM | OXYGEN SATURATION: 96 % | SYSTOLIC BLOOD PRESSURE: 132 MMHG | DIASTOLIC BLOOD PRESSURE: 81 MMHG | HEIGHT: 62 IN | TEMPERATURE: 97.5 F | WEIGHT: 130 LBS | BODY MASS INDEX: 23.92 KG/M2 | HEART RATE: 72 BPM